# Patient Record
Sex: MALE | Race: WHITE | Employment: OTHER | URBAN - METROPOLITAN AREA
[De-identification: names, ages, dates, MRNs, and addresses within clinical notes are randomized per-mention and may not be internally consistent; named-entity substitution may affect disease eponyms.]

---

## 2017-01-04 ENCOUNTER — TELEPHONE (OUTPATIENT)
Dept: FAMILY MEDICINE CLINIC | Age: 72
End: 2017-01-04

## 2017-01-17 PROBLEM — M54.12 CERVICAL RADICULITIS: Status: ACTIVE | Noted: 2017-01-17

## 2017-04-04 ENCOUNTER — OFFICE VISIT (OUTPATIENT)
Dept: FAMILY MEDICINE CLINIC | Age: 72
End: 2017-04-04

## 2017-04-04 VITALS
SYSTOLIC BLOOD PRESSURE: 130 MMHG | HEIGHT: 66 IN | BODY MASS INDEX: 22.98 KG/M2 | OXYGEN SATURATION: 98 % | DIASTOLIC BLOOD PRESSURE: 82 MMHG | TEMPERATURE: 98.3 F | WEIGHT: 143 LBS | HEART RATE: 64 BPM

## 2017-04-04 DIAGNOSIS — R05.9 COUGH: Primary | ICD-10-CM

## 2017-04-04 PROCEDURE — 99212 OFFICE O/P EST SF 10 MIN: CPT | Performed by: NURSE PRACTITIONER

## 2017-04-04 ASSESSMENT — ENCOUNTER SYMPTOMS
SORE THROAT: 0
VOMITING: 0
SINUS PRESSURE: 0
COUGH: 1
NAUSEA: 0
WHEEZING: 0
ABDOMINAL PAIN: 0
DIARRHEA: 0
SHORTNESS OF BREATH: 0

## 2017-04-05 ASSESSMENT — ENCOUNTER SYMPTOMS: CHEST TIGHTNESS: 0

## 2017-06-08 ENCOUNTER — OFFICE VISIT (OUTPATIENT)
Dept: FAMILY MEDICINE CLINIC | Age: 72
End: 2017-06-08

## 2017-06-08 VITALS
DIASTOLIC BLOOD PRESSURE: 77 MMHG | WEIGHT: 142 LBS | RESPIRATION RATE: 16 BRPM | HEART RATE: 61 BPM | HEIGHT: 65 IN | BODY MASS INDEX: 23.66 KG/M2 | SYSTOLIC BLOOD PRESSURE: 149 MMHG | TEMPERATURE: 97.9 F

## 2017-06-08 DIAGNOSIS — Z23 NEED FOR TDAP VACCINATION: ICD-10-CM

## 2017-06-08 DIAGNOSIS — Z00.00 MEDICARE ANNUAL WELLNESS VISIT, INITIAL: Primary | ICD-10-CM

## 2017-06-08 DIAGNOSIS — Z00.00 ROUTINE GENERAL MEDICAL EXAMINATION AT A HEALTH CARE FACILITY: ICD-10-CM

## 2017-06-08 PROCEDURE — 90471 IMMUNIZATION ADMIN: CPT | Performed by: FAMILY MEDICINE

## 2017-06-08 PROCEDURE — 90715 TDAP VACCINE 7 YRS/> IM: CPT | Performed by: FAMILY MEDICINE

## 2017-06-08 PROCEDURE — G0438 PPPS, INITIAL VISIT: HCPCS | Performed by: FAMILY MEDICINE

## 2017-06-08 ASSESSMENT — ANXIETY QUESTIONNAIRES: GAD7 TOTAL SCORE: 0

## 2017-06-08 ASSESSMENT — LIFESTYLE VARIABLES: HOW OFTEN DO YOU HAVE A DRINK CONTAINING ALCOHOL: 0

## 2017-06-08 ASSESSMENT — PATIENT HEALTH QUESTIONNAIRE - PHQ9: SUM OF ALL RESPONSES TO PHQ QUESTIONS 1-9: 0

## 2017-08-08 ENCOUNTER — OFFICE VISIT (OUTPATIENT)
Dept: FAMILY MEDICINE CLINIC | Age: 72
End: 2017-08-08

## 2017-08-08 VITALS
HEART RATE: 57 BPM | TEMPERATURE: 98.1 F | DIASTOLIC BLOOD PRESSURE: 66 MMHG | WEIGHT: 143 LBS | RESPIRATION RATE: 16 BRPM | BODY MASS INDEX: 23.8 KG/M2 | SYSTOLIC BLOOD PRESSURE: 151 MMHG

## 2017-08-08 DIAGNOSIS — M67.432 GANGLION CYST OF WRIST, LEFT: ICD-10-CM

## 2017-08-08 DIAGNOSIS — G56.22 ULNAR NEUROPATHY AT WRIST, LEFT: Primary | ICD-10-CM

## 2017-08-08 PROCEDURE — 99213 OFFICE O/P EST LOW 20 MIN: CPT | Performed by: FAMILY MEDICINE

## 2017-08-21 ENCOUNTER — OFFICE VISIT (OUTPATIENT)
Dept: ORTHOPEDIC SURGERY | Age: 72
End: 2017-08-21

## 2017-08-21 VITALS
DIASTOLIC BLOOD PRESSURE: 79 MMHG | HEIGHT: 65 IN | HEART RATE: 60 BPM | SYSTOLIC BLOOD PRESSURE: 142 MMHG | BODY MASS INDEX: 23.76 KG/M2 | WEIGHT: 142.6 LBS

## 2017-08-21 DIAGNOSIS — G56.22 ULNAR NEUROPATHY OF LEFT UPPER EXTREMITY: ICD-10-CM

## 2017-08-21 DIAGNOSIS — M25.532 LEFT WRIST PAIN: Primary | ICD-10-CM

## 2017-08-21 PROCEDURE — 73110 X-RAY EXAM OF WRIST: CPT | Performed by: ORTHOPAEDIC SURGERY

## 2017-08-21 PROCEDURE — 99214 OFFICE O/P EST MOD 30 MIN: CPT | Performed by: ORTHOPAEDIC SURGERY

## 2017-09-14 ENCOUNTER — OFFICE VISIT (OUTPATIENT)
Dept: ORTHOPEDIC SURGERY | Age: 72
End: 2017-09-14

## 2017-09-14 DIAGNOSIS — G56.22 ULNAR NEUROPATHY OF LEFT UPPER EXTREMITY: Primary | ICD-10-CM

## 2017-09-14 DIAGNOSIS — M54.12 RADICULOPATHY OF CERVICAL SPINE: ICD-10-CM

## 2017-09-14 PROCEDURE — 95886 MUSC TEST DONE W/N TEST COMP: CPT | Performed by: PHYSICAL MEDICINE & REHABILITATION

## 2017-09-14 PROCEDURE — 95908 NRV CNDJ TST 3-4 STUDIES: CPT | Performed by: PHYSICAL MEDICINE & REHABILITATION

## 2017-09-25 ENCOUNTER — OFFICE VISIT (OUTPATIENT)
Dept: ORTHOPEDIC SURGERY | Age: 72
End: 2017-09-25

## 2017-09-25 VITALS — BODY MASS INDEX: 23.76 KG/M2 | WEIGHT: 142.64 LBS | HEIGHT: 65 IN

## 2017-09-25 DIAGNOSIS — G56.22 ULNAR NEUROPATHY OF LEFT UPPER EXTREMITY: ICD-10-CM

## 2017-09-25 DIAGNOSIS — R22.32 MASS OF LEFT WRIST: ICD-10-CM

## 2017-09-25 DIAGNOSIS — M25.532 LEFT WRIST PAIN: Primary | ICD-10-CM

## 2017-09-25 PROCEDURE — 99214 OFFICE O/P EST MOD 30 MIN: CPT | Performed by: ORTHOPAEDIC SURGERY

## 2017-09-26 ENCOUNTER — TELEPHONE (OUTPATIENT)
Dept: ORTHOPEDIC SURGERY | Age: 72
End: 2017-09-26

## 2017-09-27 DIAGNOSIS — R22.32 MASS OF LEFT WRIST: Primary | ICD-10-CM

## 2017-09-27 DIAGNOSIS — G56.22 ULNAR NEUROPATHY OF LEFT UPPER EXTREMITY: ICD-10-CM

## 2017-10-02 ENCOUNTER — OFFICE VISIT (OUTPATIENT)
Dept: FAMILY MEDICINE CLINIC | Age: 72
End: 2017-10-02

## 2017-10-02 VITALS
OXYGEN SATURATION: 98 % | TEMPERATURE: 98.1 F | RESPIRATION RATE: 16 BRPM | HEIGHT: 65 IN | HEART RATE: 69 BPM | WEIGHT: 142.6 LBS | DIASTOLIC BLOOD PRESSURE: 62 MMHG | BODY MASS INDEX: 23.76 KG/M2 | SYSTOLIC BLOOD PRESSURE: 123 MMHG

## 2017-10-02 DIAGNOSIS — R22.32 FOREARM MASS, LEFT: Primary | ICD-10-CM

## 2017-10-02 DIAGNOSIS — Z01.818 PREOP EXAMINATION: ICD-10-CM

## 2017-10-02 DIAGNOSIS — R22.32 FOREARM MASS, LEFT: ICD-10-CM

## 2017-10-02 LAB
A/G RATIO: 1.4 (ref 1.1–2.2)
ALBUMIN SERPL-MCNC: 4.1 G/DL (ref 3.4–5)
ALP BLD-CCNC: 77 U/L (ref 40–129)
ALT SERPL-CCNC: 6 U/L (ref 10–40)
ANION GAP SERPL CALCULATED.3IONS-SCNC: 13 MMOL/L (ref 3–16)
AST SERPL-CCNC: 16 U/L (ref 15–37)
BILIRUB SERPL-MCNC: 0.4 MG/DL (ref 0–1)
BUN BLDV-MCNC: 19 MG/DL (ref 7–20)
CALCIUM SERPL-MCNC: 9.7 MG/DL (ref 8.3–10.6)
CHLORIDE BLD-SCNC: 102 MMOL/L (ref 99–110)
CO2: 28 MMOL/L (ref 21–32)
CREAT SERPL-MCNC: 1.2 MG/DL (ref 0.8–1.3)
GFR AFRICAN AMERICAN: >60
GFR NON-AFRICAN AMERICAN: 60
GLOBULIN: 2.9 G/DL
GLUCOSE BLD-MCNC: 70 MG/DL (ref 70–99)
HCT VFR BLD CALC: 40.5 % (ref 40.5–52.5)
HEMOGLOBIN: 13.7 G/DL (ref 13.5–17.5)
MCH RBC QN AUTO: 31.9 PG (ref 26–34)
MCHC RBC AUTO-ENTMCNC: 33.7 G/DL (ref 31–36)
MCV RBC AUTO: 94.7 FL (ref 80–100)
PDW BLD-RTO: 13.7 % (ref 12.4–15.4)
PLATELET # BLD: 188 K/UL (ref 135–450)
PMV BLD AUTO: 8.6 FL (ref 5–10.5)
POTASSIUM SERPL-SCNC: 3.9 MMOL/L (ref 3.5–5.1)
RBC # BLD: 4.28 M/UL (ref 4.2–5.9)
SODIUM BLD-SCNC: 143 MMOL/L (ref 136–145)
TOTAL PROTEIN: 7 G/DL (ref 6.4–8.2)
WBC # BLD: 7.5 K/UL (ref 4–11)

## 2017-10-02 PROCEDURE — 93000 ELECTROCARDIOGRAM COMPLETE: CPT | Performed by: NURSE PRACTITIONER

## 2017-10-02 PROCEDURE — 99214 OFFICE O/P EST MOD 30 MIN: CPT | Performed by: NURSE PRACTITIONER

## 2017-10-02 ASSESSMENT — ENCOUNTER SYMPTOMS
BACK PAIN: 1
EYES NEGATIVE: 1
RESPIRATORY NEGATIVE: 1
GASTROINTESTINAL NEGATIVE: 1
ALLERGIC/IMMUNOLOGIC NEGATIVE: 1

## 2017-10-02 NOTE — MR AVS SNAPSHOT
After Visit Summary             Violeta Guardado   10/2/2017 1:00 PM   Office Visit    Description:  Male : 1945   Provider:  Aj Luna NP   Department:  [facility] Medicine [address]              Your Follow-Up and Future Appointments         Below is a list of your follow-up and future appointments. This may not be a complete list as you may have made appointments directly with providers that we are not aware of or your providers may have made some for you. Please call your providers to confirm appointments. It is important to keep your appointments. Please bring your current insurance card, photo ID, co-pay, and all medication bottles to your appointment. If self-pay, payment is expected at the time of service. Your To-Do List     Future Appointments Provider Department Dept Phone    10/11/2017 9:00 AM DELGADO Ortiz  [phone]    10/13/2017 10:30 AM Salena Crow MD XD Nutrition [phone]    Future Orders Complete By Expires    CBC [ZRT316 Custom]  10/2/2017 10/2/2018    COMPREHENSIVE METABOLIC PANEL [ECU63 Custom]  10/2/2017 10/2/2018    Follow-Up    Return if symptoms worsen or fail to improve.          Information from Your Visit        Department     Name Address Phone Fax    [address] [place] [address] [address] [facility] [phone]      You Were Seen for:         Comments    Forearm mass, left   [[id number]]         Vital Signs     Blood Pressure Pulse Temperature Respirations Height Weight    123/62 (Site: Left Arm, Position: Sitting, Cuff Size: Medium Adult) 69 98.1 °F (36.7 °C) (Oral) 16 5' 5\" (1.651 m) 142 lb 9.6 oz (64.7 kg)    Oxygen Saturation Body Mass Index Smoking Status             98% 23.73 kg/m2 Former Smoker            Medications and Orders      Your Current Medications Are lisinopril-hydrochlorothiazide (PRINZIDE;ZESTORETIC) 10-12.5 MG per tablet TAKE ONE TABLET BY MOUTH DAILY FOR HIGH BLOOD PRESSURE    VIAGRA 100 MG tablet TAKE AS NEEDED FOR ERECTILE DYSFUNCTION AS DIRECTED. ONCE IN 24 HOURS    aspirin 81 MG EC tablet Take 81 mg by mouth daily.         Allergies              Clarithromycin     Gabapentin     Pravastatin     Promethazine-codeine     Vicodin [Hydrocodone-acetaminophen]     Causes prostate swelling- needs to take terazosin if takes vicodin    Zegerid [Omeprazole]       We Ordered/Performed the following           EKG 12 Lead     Scheduling Instructions:    Physical         Result Summary for EKG 12 Lead      Result Information     Status          Final result (Collected: 10/2/2017  1:27 PM)           10/2/2017  1:27 PM      Scans on Order [id number]            ECG on 10/2/2017  1:27 PM : ECG Report                     Additional Information        Basic Information     Date Of Birth Sex Race Ethnicity Preferred Language    1945 Male White Non-/Non  English      Problem List as of 10/2/2017  Date Reviewed: 9/25/2017                Mass of left wrist    Ulnar neuropathy of left upper extremity    Left wrist pain    Cervical radiculitis    Bilateral high frequency sensorineural hearing loss    HTN (hypertension)    Related Goals    Blood Pressure < 140/90    GERD (gastroesophageal reflux disease)    Left TKR      Your Goals as of 10/2/2017 at 1:42 PM              Today    8/21/17 8/8/17       Blood Pressure    Blood Pressure < 140/90   123/62  142/79  151/66    Related Problems    HTN (hypertension)      Immunizations as of 10/2/2017     Name Date    Influenza Virus Vaccine 10/23/2014    Influenza, High Dose 11/17/2016, 10/29/2015    Pneumococcal Polysaccharide (Ssuikislf55) 2/3/2013    Tdap (Boostrix, Adacel) 6/8/2017      Preventive Care        Date Due    One-time abdominal aortic aneurism (AAA) screening is recommended for all men between the age of 73-68 who have ever smoked 1945    Hepatitis C screening is recommended for all adults regardless of risk factors born between [facility] at least once (lifetime) who have never been tested. 1945    Cholesterol Screening 12/12/1985    Zoster Vaccine 12/12/2005    Pneumococcal Vaccines (two) for all adults aged 72 and over (2 of 2 - PCV13) 2/3/2014    Yearly Flu Vaccine (1) 9/1/2017    Colonoscopy 10/16/2023    Tetanus Combination Vaccine (2 - Td) 6/8/2027            [email or web address] Signup           Our records indicate that you have an active Shiftboard Online Scheduling account. You can view your After Visit Summary by going to [email or web address] and logging in with your Shiftboard Online Scheduling username and password. If you don't have a Shiftboard Online Scheduling username and password but a parent or guardian has access to your record, the parent or guardian should login with their own Shiftboard Online Scheduling username and password and access your record to view the After Visit Summary. Additional Information  If you have questions, please contact the physician practice where you receive care. Remember, Shiftboard Online Scheduling is NOT to be used for urgent needs. For medical emergencies, dial 911. For questions regarding your Shiftboard Online Scheduling account call [phone]. If you have a clinical question, please call your doctor's office.

## 2017-10-02 NOTE — PROGRESS NOTES
Negative. Musculoskeletal: Positive for arthralgias (left wrist) and back pain (chronic). Age related arthritis   Skin: Negative. Allergic/Immunologic: Negative. Neurological: Negative. Hematological: Negative. Psychiatric/Behavioral: Negative. Physical Exam     Physical Exam   Constitutional: He is oriented to person, place, and time. He appears well-developed and well-nourished. No distress. HENT:   Head: Normocephalic and atraumatic. Right Ear: External ear normal.   Left Ear: External ear normal.   Nose: Nose normal.   Mouth/Throat: Oropharynx is clear and moist. No oropharyngeal exudate. Eyes: Conjunctivae and EOM are normal. Right eye exhibits no discharge. Left eye exhibits no discharge. Neck: Normal range of motion. Neck supple. Cardiovascular: Normal rate, regular rhythm and normal heart sounds. Exam reveals no gallop and no friction rub. No murmur heard. Pulmonary/Chest: Effort normal and breath sounds normal. No respiratory distress. He has no wheezes. He has no rales. Abdominal: Soft. Bowel sounds are normal. He exhibits no distension. There is no tenderness. Musculoskeletal: Normal range of motion. He exhibits no edema or tenderness. Lymphadenopathy:     He has no cervical adenopathy. Neurological: He is alert and oriented to person, place, and time. He exhibits normal muscle tone. Coordination normal.   Skin: Skin is warm and dry. No rash noted. He is not diaphoretic. No erythema. No pallor. Psychiatric: He has a normal mood and affect. His behavior is normal. Judgment and thought content normal.   Nursing note and vitals reviewed. EKG Interpretation:  normal EKG, normal sinus rhythm, unchanged from previous tracings. Lab Review ---CBC and CMP ordered today  No visits with results within 6 Month(s) from this visit.   Latest known visit with results is:    Office Visit on 07/16/2015   Component Date Value    Glucose, UA POC 07/16/2015 n     Bilirubin, UA 07/16/2015 n     Ketones, UA 07/16/2015 n     Spec Grav, UA 07/16/2015 1.020     Blood, UA POC 07/16/2015 n     pH, UA 07/16/2015 6.0     Protein, UA POC 07/16/2015 n     Urobilinogen, UA 07/16/2015 neg     Leukocytes, UA 07/16/2015 n     Nitrite, UA 07/16/2015 n            Assessment:       70 y.o. patient with planned surgery as above. Known risk factors for perioperative complications: Anemia, Hypertension  Current medications which may produce withdrawal symptoms if withheld perioperatively: none      Plan:     1. Preoperative workup as follows: hemoglobin, hematocrit, electrolytes, creatinine, EKG  2. Change in medication regimen before surgery: Hold all medications on morning of surgery  3. Prophylaxis for cardiac events with perioperative beta-blockers: Not indicated  ACC/AHA indications for pre-operative beta-blocker use:    · Vascular surgery with history of postitive stress test  · Intermediate or high risk surgery with history of CAD   · Intermediate or high risk surgery with multiple clinical predictors of CAD- 2 of the following: history of compensated or prior heart failure, history of cerebrovascular disease, DM, or renal insufficiency    Routine administration of higher-dose, long-acting metoprolol in beta-blockernaïve patients on the day of surgery, and in the absence of dose titration is associated with an overall increase in mortality. Beta-blockers should be started days to weeks prior to surgery and titrated to pulse < 70.  4. Deep vein thrombosis prophylaxis: regimen to be chosen by surgical team  5.  No contraindications to planned surgery    Keya Isaac NP

## 2017-10-03 NOTE — OP NOTE
[facility] Sports Medicine  Procedure Note  [facility]  10/4/2017    Pre-operative Diagnosis:Left forearm deep mass and ulnar neuropathy    Post-operative Diagnosis: same    Procedure:  1. Excision Left forearm deep mass 25mm    2. Left ulnar nerve decompression at wrist    Surgeon: Liz Cordova    Anesthesia:  General    Complications:  None    INDICATIONS FOR PROCEDURE: The patient has clinical evidence of symptomatic deep mass along the volar left wrist and distal forearm with electrodiagnostic and clinical signs of ulnar neuropathy at the forearm and wrist and has failed appropriate conservative management. The patient understands the relevant risks, benefits, limitations, chance of recurrence and healing process of the procedure. PROCEDURE: The patient was brought to the operating room and anesthetized with general anesthesia. The identified and marked extremity was then prepped and draped in a standard fashion. A well-padded tourniquet was applied to the upper arm. The arm was exsanguinated and the tourniquet elevated to 250 mmHg. A standard zigzag incision was made at the level of the prominence over the left distal forearm and wrist.  Dissection carried down through skin and subcutaneous tissue with careful attention paid to hemostasis. The tendinous structures were dissected free and retracted. The ulnar neurovascular bundle was carefully dissected free from the mass and retracted. The mass had an appearance consistent with likely deep synovial cyst and was intimately compressing the deep aspect of the ulnar nerve at the wrist and extending into the proximal aspect of Guyon's canal. It did not have a classic appearance of Schwannoma or nerve sheath origin. The cystic material measured approximately 25mm and was excised carefully with great care undertaken toward hemostasis and gentle protection of the ulnar nerve.   The excised tissue was

## 2017-10-04 ENCOUNTER — HOSPITAL ENCOUNTER (OUTPATIENT)
Dept: SURGERY | Age: 72
Discharge: HOME | End: 2017-10-04
Attending: ORTHOPAEDIC SURGERY | Admitting: ORTHOPAEDIC SURGERY

## 2017-10-04 VITALS
TEMPERATURE: 97.7 F | DIASTOLIC BLOOD PRESSURE: 77 MMHG | OXYGEN SATURATION: 96 % | HEART RATE: 68 BPM | SYSTOLIC BLOOD PRESSURE: 136 MMHG | RESPIRATION RATE: 16 BRPM

## 2017-10-04 RX ORDER — DIPHENHYDRAMINE HYDROCHLORIDE 50 MG/ML
12.5 INJECTION INTRAMUSCULAR; INTRAVENOUS
Status: ACTIVE | OUTPATIENT
Start: 2017-10-04 | End: 2017-10-04

## 2017-10-04 RX ORDER — SODIUM CHLORIDE 0.9 % (FLUSH) 0.9 %
10 SYRINGE (ML) INJECTION EVERY 12 HOURS SCHEDULED
Status: DISCONTINUED | OUTPATIENT
Start: 2017-10-04 | End: 2017-10-05 | Stop reason: HOSPADM

## 2017-10-04 RX ORDER — MORPHINE SULFATE 2 MG/ML
2 INJECTION, SOLUTION INTRAMUSCULAR; INTRAVENOUS EVERY 5 MIN PRN
Status: DISCONTINUED | OUTPATIENT
Start: 2017-10-04 | End: 2017-10-05 | Stop reason: HOSPADM

## 2017-10-04 RX ORDER — HYDRALAZINE HYDROCHLORIDE 20 MG/ML
5 INJECTION INTRAMUSCULAR; INTRAVENOUS EVERY 10 MIN PRN
Status: DISCONTINUED | OUTPATIENT
Start: 2017-10-04 | End: 2017-10-05 | Stop reason: HOSPADM

## 2017-10-04 RX ORDER — LIDOCAINE HYDROCHLORIDE 10 MG/ML
1 INJECTION, SOLUTION EPIDURAL; INFILTRATION; INTRACAUDAL; PERINEURAL
Status: ACTIVE | OUTPATIENT
Start: 2017-10-04 | End: 2017-10-04

## 2017-10-04 RX ORDER — OXYCODONE HYDROCHLORIDE AND ACETAMINOPHEN 5; 325 MG/1; MG/1
2 TABLET ORAL PRN
Status: ACTIVE | OUTPATIENT
Start: 2017-10-04 | End: 2017-10-04

## 2017-10-04 RX ORDER — SODIUM CHLORIDE 0.9 % (FLUSH) 0.9 %
10 SYRINGE (ML) INJECTION PRN
Status: DISCONTINUED | OUTPATIENT
Start: 2017-10-04 | End: 2017-10-05 | Stop reason: HOSPADM

## 2017-10-04 RX ORDER — OXYCODONE HYDROCHLORIDE AND ACETAMINOPHEN 5; 325 MG/1; MG/1
1 TABLET ORAL EVERY 6 HOURS PRN
Qty: 28 TABLET | Refills: 0 | Status: SHIPPED | OUTPATIENT
Start: 2017-10-04 | End: 2017-10-11

## 2017-10-04 RX ORDER — ONDANSETRON 2 MG/ML
4 INJECTION INTRAMUSCULAR; INTRAVENOUS
Status: ACTIVE | OUTPATIENT
Start: 2017-10-04 | End: 2017-10-04

## 2017-10-04 RX ORDER — MORPHINE SULFATE 2 MG/ML
1 INJECTION, SOLUTION INTRAMUSCULAR; INTRAVENOUS EVERY 5 MIN PRN
Status: DISCONTINUED | OUTPATIENT
Start: 2017-10-04 | End: 2017-10-05 | Stop reason: HOSPADM

## 2017-10-04 RX ORDER — ONDANSETRON 4 MG/1
4 TABLET, FILM COATED ORAL EVERY 8 HOURS PRN
Qty: 10 TABLET | Refills: 1 | Status: SHIPPED | OUTPATIENT
Start: 2017-10-04 | End: 2018-01-03 | Stop reason: ALTCHOICE

## 2017-10-04 RX ORDER — MEPERIDINE HYDROCHLORIDE 50 MG/ML
12.5 INJECTION INTRAMUSCULAR; INTRAVENOUS; SUBCUTANEOUS EVERY 5 MIN PRN
Status: DISCONTINUED | OUTPATIENT
Start: 2017-10-04 | End: 2017-10-05 | Stop reason: HOSPADM

## 2017-10-04 RX ORDER — LABETALOL HYDROCHLORIDE 5 MG/ML
5 INJECTION, SOLUTION INTRAVENOUS EVERY 10 MIN PRN
Status: DISCONTINUED | OUTPATIENT
Start: 2017-10-04 | End: 2017-10-05 | Stop reason: HOSPADM

## 2017-10-04 RX ORDER — OXYCODONE HYDROCHLORIDE AND ACETAMINOPHEN 5; 325 MG/1; MG/1
1 TABLET ORAL PRN
Status: ACTIVE | OUTPATIENT
Start: 2017-10-04 | End: 2017-10-04

## 2017-10-04 RX ORDER — SODIUM CHLORIDE, SODIUM LACTATE, POTASSIUM CHLORIDE, CALCIUM CHLORIDE 600; 310; 30; 20 MG/100ML; MG/100ML; MG/100ML; MG/100ML
INJECTION, SOLUTION INTRAVENOUS CONTINUOUS
Status: DISCONTINUED | OUTPATIENT
Start: 2017-10-04 | End: 2017-10-05 | Stop reason: HOSPADM

## 2017-10-04 RX ADMIN — SODIUM CHLORIDE, SODIUM LACTATE, POTASSIUM CHLORIDE, CALCIUM CHLORIDE: 600; 310; 30; 20 INJECTION, SOLUTION INTRAVENOUS at 07:46

## 2017-10-04 ASSESSMENT — PAIN SCALES - GENERAL
PAINLEVEL_OUTOF10: 0

## 2017-10-04 NOTE — IP AVS SNAPSHOT
After Visit Summary  (Discharge Instructions)    Medication List for Home    Based on the information you provided to us as well as any changes during this visit, the following is your updated medication list.  Compare this with your prescription bottles at home. If you have any questions or concerns, contact your primary care physician's office. Daily Medication List (This medication list can be shared with any healthcare provider who is helping you manage your medications)      There are NEW medications for you. START taking them after you leave the hospital        Last Dose    Next Dose Due AM NOON PM NIGHT    ondansetron 4 MG tablet   Commonly known as:  ZOFRAN   Take 1 tablet by mouth every 8 hours as needed for Nausea                                         oxyCODONE-acetaminophen 5-325 MG per tablet   Commonly known as:  PERCOCET   Take 1 tablet by mouth every 6 hours as needed for Pain . These are medications you told us you were taking at home, CONTINUE taking them after you leave the hospital        Last Dose    Next Dose Due AM NOON PM NIGHT    aspirin 81 MG EC tablet   Take 81 mg by mouth daily. lisinopril-hydrochlorothiazide 10-12.5 MG per tablet   Commonly known as:  PRINZIDE;ZESTORETIC   TAKE ONE TABLET BY MOUTH DAILY FOR HIGH BLOOD PRESSURE                                         VIAGRA 100 MG tablet   Generic drug:  sildenafil   TAKE AS NEEDED FOR ERECTILE DYSFUNCTION AS DIRECTED.  ONCE IN 24 HOURS                                              Where to Get Your Medications      You can get these medications from any pharmacy     Bring a paper prescription for each of these medications     ondansetron 4 MG tablet    oxyCODONE-acetaminophen 5-325 MG per tablet               Allergies as of 10/4/2017        Reactions    Clarithromycin     Gabapentin     Pravastatin     Promethazine-codeine A surgical site infection (SSI) is an infection that occurs after surgery in the part of the body where the surgery took place. Most patients who have surgery do not develop an infection. However, infections can develop in about 1-3 cases for every 100 patients who have had surgery. Our goal is for you to NOT experience any complications and be completely satisfied with your care! However, some signs or symptoms to look for and report immediately to your doctor are:   1. Fever above 101 degrees    2. Redness and increasing pain around the area  where you had surgery   3. Drainage of cloudy fluid or pus coming from the surgical area    Some of the things we/ you can do to prevent SSI's are:   1. Clean hands with soap and water or an alcohol-based hand rub before and after caring for the operative area. This occurs the day of surgery and for the next 2 weeks. 2.Sometimes you receive an appropriate antibiotic within 60 minutes before your surgery or take one for several days after surgery depending on your surgeon's instructions and/or the type of surgery you are having. 3. Family and/or friends who visit you should NOT touch the surgical wound or dressings until advised by your surgeon. 4. Be sure to elevate and decrease the swelling after your surgery to help prevent infection. 5. If you are a diabetic, you need to closely monitor your blood sugar levels and report any significant increases or changes to your surgeon to help promote the healing process. Take percocet as prescribed as needed for pain. Take zofran as prescribed as needed for nausea. Important information for a smoker       SMOKING: QUIT SMOKING. THIS IS THE MOST IMPORTANT ACTION YOU CAN TAKE TO IMPROVE YOUR CURRENT AND FUTURE HEALTH. Call the [facility] [place] NOW ([phone])    Smoking harms nonsmokers.  When nonsmokers are around people who smoke, they absorb nicotine, carbon monoxide, and other ingredients of tobacco smoke. DO NOT SMOKE AROUND CHILDREN     Children exposed to secondhand smoke are at an increased risk of:  Sudden Infant Death Syndrome (SIDS), acute respiratory infections, inflammation of the middle ear, and severe asthma. Over a longer time, it causes heart disease and lung cancer. There is no safe level of exposure to secondhand smoke. MyChart Signup     Our records indicate that you have an active Luminary Microt account. You can view your After Visit Summary by going to [email or web address] and logging in with your Jascha username and password. If you don't have a Jascha username and password but a parent or guardian has access to your record, the parent or guardian should login with their own Jascha username and password and access your record to view the After Visit Summary. Additional Information  If you have questions, please contact the physician practice where you receive care. Remember, Jascha is NOT to be used for urgent needs. For medical emergencies, dial 911. For questions regarding your Cohda Wirelesshart account call [phone]. If you have a clinical question, please call your doctor's office. View your information online  ? Review your current list of  medications, immunization, and allergies. ? Review your future test results online . ? Review your discharge instructions provided by your caregivers at discharge    Certain functionality such as prescription refills, scheduling appointments or sending messages to your provider are not activated if your provider does not use i-Human Patients in his/her office    For questions regarding your Luminary Microt account call [phone]. If you have a clinical question, please call your doctor's office.          The information on all pages of the After Visit Summary has been reviewed with me, the patient and/or responsible adult, by my health care provider(s). I had the opportunity to ask questions regarding this information. I understand I should dispose of my armband safely at home to protect my health information. A complete copy of the After Visit Summary has been given to me, the patient and/or responsible adult.            Patient Signature/Responsible Adult:____________________    Clinician Signature:_____________________    Date:_____________________    Time:_____________________

## 2017-10-04 NOTE — IP AVS SNAPSHOT
Patient Information     Patient Name MORTEZA Thompson 1945         This is your updated medication list to keep with you all times      TAKE these medications     aspirin 81 MG EC tablet       lisinopril-hydrochlorothiazide 10-12.5 MG per tablet   Commonly known as:  PRINZIDE;ZESTORETIC   TAKE ONE TABLET BY MOUTH DAILY FOR HIGH BLOOD PRESSURE       ondansetron 4 MG tablet   Commonly known as:  ZOFRAN   Take 1 tablet by mouth every 8 hours as needed for Nausea       oxyCODONE-acetaminophen 5-325 MG per tablet   Commonly known as:  PERCOCET   Take 1 tablet by mouth every 6 hours as needed for Pain . VIAGRA 100 MG tablet   Generic drug:  sildenafil   TAKE AS NEEDED FOR ERECTILE DYSFUNCTION AS DIRECTED.  ONCE IN 24 HOURS

## 2017-10-04 NOTE — ANESTHESIA POST-OP
Postoperative Anesthesia Note    Name:    Brad Adams  MRN:      [id number]    Patient Vitals for the past 12 hrs:   BP Temp Temp src Pulse Resp SpO2   10/04/17 0950 136/77 - - 68 16 96 %   10/04/17 0945 136/77 97.7 °F (36.5 °C) Temporal 71 16 96 %   10/04/17 0940 122/74 - - 67 16 97 %   10/04/17 0935 124/67 - - - - -   10/04/17 0930 126/67 97.7 °F (36.5 °C) Temporal 67 12 96 %   10/04/17 0739 (!) 149/82 97.8 °F (36.6 °C) - 50 16 100 %        LABS:    CBC  Lab Results   Component Value Date/Time    WBC 7.5 10/02/2017 01:50 PM    HGB 13.7 10/02/2017 01:50 PM    HCT 40.5 10/02/2017 01:50 PM     10/02/2017 01:50 PM     RENAL  Lab Results   Component Value Date/Time     10/02/2017 01:50 PM    K 3.9 10/02/2017 01:50 PM     10/02/2017 01:50 PM    CO2 28 10/02/2017 01:50 PM    BUN 19 10/02/2017 01:50 PM    CREATININE 1.2 10/02/2017 01:50 PM    GLUCOSE 70 10/02/2017 01:50 PM     COAGS  Lab Results   Component Value Date/Time    PROTIME 11.2 01/22/2013 09:10 AM    INR 0.98 01/22/2013 09:10 AM    APTT 32.9 01/22/2013 09:10 AM       Intake & Output: In: 12 [P.O.:100; I.V.:700]  Out: 0     Nausea & Vomiting:  No    Level of Consciousness:  Awake    Pain Assessment:  Adequate analgesia    Anesthesia Complications:  No apparent anesthetic complications    SUMMARY      Vital signs stable on discharge from Stage I post anesthesia care.   Care transferred from Anesthesiology department on discharge from perioperative area

## 2017-10-04 NOTE — ANESTHESIA PRE-OP
149/82   10/02/17 123/62   08/21/17 (!) 142/79       NPO Status: Time of last liquid consumption: 2000                        Time of last solid consumption: 2000                        Date of last liquid consumption: 10/03/17                        Date of last solid food consumption: 10/03/17    BMI:   Wt Readings from Last 3 Encounters:   10/02/17 142 lb 9.6 oz (64.7 kg)   09/27/17 142 lb (64.4 kg)   09/25/17 142 lb 10.2 oz (64.7 kg)     There is no height or weight on file to calculate BMI. Anesthesia Evaluation   no history of anesthetic complications:   Airway: Mallampati: II  TM distance: <3 FB   Neck ROM: full  Mouth opening: > = 3 FB Dental:          Pulmonary:       ROS comment: H/o tob   Cardiovascular:    (+) hypertension:,             Beta Blocker:  Not on Beta Blocker   Neuro/Psych:   (+) neuromuscular disease:,    GI/Hepatic/Renal:   (+) hiatal hernia, GERD: well controlled,         Endo/Other: negative ROS         Abdominal:                 Pre-Operative Diagnosis: LEFT FOREARM DEEP MASS    70 y.o.   BMI:  There is no height or weight on file to calculate BMI.      Vitals:    10/04/17 0739   BP: (!) 149/82   Pulse: 50   Resp: 16   Temp: 97.8 °F (36.6 °C)   SpO2: 100%       Allergies   Allergen Reactions    Clarithromycin     Gabapentin     Pravastatin     Promethazine-Codeine     Vicodin [Hydrocodone-Acetaminophen]      Causes prostate swelling- needs to take terazosin if takes vicodin    Zegerid [Omeprazole]        Social History   Substance Use Topics    Smoking status: Former Smoker     Packs/day: 0.50     Years: 17.00     Quit date: 10/21/1976    Smokeless tobacco: Never Used    Alcohol use No       LABS:    CBC  Lab Results   Component Value Date/Time    WBC 7.5 10/02/2017 01:50 PM    HGB 13.7 10/02/2017 01:50 PM    HCT 40.5 10/02/2017 01:50 PM     10/02/2017 01:50 PM     RENAL  Lab Results   Component Value Date/Time     10/02/2017 01:50 PM    K 3.9 10/02/2017 01:50 PM  10/02/2017 01:50 PM    CO2 28 10/02/2017 01:50 PM    BUN 19 10/02/2017 01:50 PM    CREATININE 1.2 10/02/2017 01:50 PM    GLUCOSE 70 10/02/2017 01:50 PM     COAGS  Lab Results   Component Value Date/Time    PROTIME 11.2 01/22/2013 09:10 AM    INR 0.98 01/22/2013 09:10 AM    APTT 32.9 01/22/2013 09:10 AM        Anesthesia Plan    ASA 2     general   (Risks, benefits and alternatives of GA discussed with pt. Questions answered. Willing to proceed.)  intravenous induction   Anesthetic plan and risks discussed with patient.             Parish Manrique MD   10/4/2017

## 2017-10-11 ENCOUNTER — HOSPITAL ENCOUNTER (OUTPATIENT)
Dept: OCCUPATIONAL THERAPY | Age: 72
Discharge: HOME | End: 2017-10-11
Admitting: ORTHOPAEDIC SURGERY

## 2017-10-11 ENCOUNTER — HOSPITAL ENCOUNTER (OUTPATIENT)
Dept: OCCUPATIONAL THERAPY | Age: 72
Discharge: HOME | End: 2017-09-30
Admitting: ORTHOPAEDIC SURGERY

## 2017-10-11 NOTE — FLOWSHEET NOTE
[facility],  [address] [place]  Phone: [phone]  Fax [phone]  Hand Therapy Daily Treatment Note  Date:  10/11/2017    Patient: Aime Conklin   : 1945   MRN: [id number]  Referring Physician: Referring Practitioner: Sriram Castro    Medical Diagnosis Information:   Diagnosis: R22.32 (ICD-10-CM) - Mass of left wrist; G56.22 (ICD-10-CM) - Ulnar neuropathy of left upper extremity   Treatment Diagnosis: wrist stiffness L M25.642, L elbow stiffness M25.622-S/P EXCISION LEFT FOREARM DEEP MASS 10-4-17     Date of injury:gradual onset over past 2 months  Date of Surgery/Type of procedure:     Excision of mass in forearm                                 Insurance information:OT Insurance Information: [facility] $25 copay   Comorbidities Affecting Functional Performance:     []Anxiety (F41.9)/Depression (F32.9)   []Diabetes Type 1(E10.65) or 2 (E11.65)   []Rheumatoid Arthritis (M05.9)  []Fibromyalgia (M79.7)  []Neuropathy(G60.9)  []Osteoarthritis(M19.91)  [x]None   []Other:    G-code: OT G-codes  Score: 34%  Functional Limitation: Carrying, moving and handling objects  Carrying, Moving and Handling Objects Current Status (): At least 20 percent but less than 40 percent impaired, limited or restricted  Carrying, Moving and Handling Objects Goal Status ():  At least 1 percent but less than 20 percent impaired, limited or restricted    Date of Patient follow up with Physician:  Preferred Language for Healthcare:   [x]English       []other:  Latex Allergy:  [x]NO      []YES  RESTRICTIONS/PRECAUTIONS:  NONE  Progress Note: [x]  Yes  []  No  Next due by : Visit #10       Visit # Insurance Allowable   1 $25 copay     Pain level: 3 /10     SUBJECTIVE:  Background/Relevant Medical & Therapy History: Reports about 2 months of numbness in ring and small fingers, pain, had a volar forearm cyst for several years which HEP activities related to strengthening, flexibility, endurance, ROM of scapular, scapulothoracic and UE control with self care, reaching, carrying, lifting, house/yardwork, driving/computer work    Manual Treatments:   [] (34533) Provided manual therapy to mobilize soft tissue/joints of the UE for the purpose of modulating pain, promoting relaxation,  increasing ROM, reducing/eliminating soft tissue swelling/inflammation/restriction, improving soft tissue extensibility and allowing for proper ROM for normal function with self care, reaching, carrying, lifting, house/yardwork, driving/computer work    Splinting:  [] Fabrication of: L-code  [] (86702) Checkout for orthotic/prosthetic use, established patient   [] (38898) Orthotic management and training (fitting and assessment)  [] Comments:    Charges:  Timed Code Treatment Minutes: 15   Total Treatment Minutes: 45   [x] EVAL (LOW) 23745   [] OT Re-eval (86457)  [] EVAL (MOD) 44561   [] EVAL (HIGH) 88359       [x] Preston (48462) x      [] JWASS(96615)  [] NMR (64015) x      [] Estim (attended) (24825)   [] Manual ([id number]) x       [] US (48781)  [] TA (55126) x      [] Paraffin (65197)  [] ADL  ([phone]) x     [] Splint/L code:    [] Estim (unattended) (29788)  [] Other:  [](96338) Checkout for orthotic use, established patient x       [] (91346) Orthotic mgmt & training  x        GOALS:Short Term Goals: To be achieved in: 2 weeks  1. Independent in HEP and progression per patient tolerance, in order to prevent re-injury. 2. Patient will have a decrease in pain to facilitate improvement in movement, function, and ADLs as indicated by Functional Deficits.     Long Term Goals to be achieved in 6  weeks, including patient directed goals to address identified performance deficits:  1) Pt to be independent in graded HEP progression with a good level of effort and compliance.   2) Pt to report a score of 15 % or less on the Quick DASH disability questionnaire for increased

## 2017-10-11 NOTE — PLAN OF CARE
limited or restricted      [x] Patient reported history, allergies, and medications reviewed - see intake form. SUBJECTIVE:  Date of injury: gradual onset of symptoms over past 2 months  Date of surgery:10/4/17    Background/Relevant Medical & Therapy History: Reports about 2 months of numbness in ring and small fingers, pain, had a volar forearm cyst for several years which suddenly disloved, and then had his new symptoms. 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   Recreational Activities/Meaningful Interests: golfing, walking    Prior Level of Function: [x] Independent with ADLs/IADLs     [] Assistance needed (describe):    Patient-Identified Primary Performance Deficits (to be addressed in POC):   [] bathing    [] household tasks (cooking/cleaning)   [] dressing    [] self feeding   [] grooming    [x] work/education-needs to be able to frequently lift boxes   [] functional mobility   [] sleeping/rest   [] toileting/hygiene   [x] recreational activities-golf   [] driving    [] community/social participation   [] other:     Comorbidities Affecting Functional Performance:     []Anxiety (F41.9)/Depression (F32.9)   []Diabetes Type 1(E10.65) or 2 (E11.65)   []Rheumatoid Arthritis (M05.9)  []Fibromyalgia (M79.7)  []Neuropathy(G60.9)  []Osteoarthritis(M19.91)  [x]None   []Other:    OBJECTIVE:   Date:  Hand Dominance:     []  Right    [] Left 10/11/2017     Objective Measures:    PAIN 3/10   Quick DASH 34%   Digits tip to DPFC in cm WNL   Thumb ROM WNL   Thumb opposition  WNL   Thumb Radial/Palmar abd ROM WNL   Wrist ROM Ext/Flex R:75/70  L:75/50   Rad/Uln dev ROM Not assessed   Forearm ROM  Sup/pron WNL   Elbow ROM Ext/flex No reported deficits   Shoulder Flex  Shoulder Abd  Shoulder IR/ER WNL   Edema in cm circumf. wrist R:16.9  L:17.7    strength in lbs R:  L:deferred   Pinch Strengthin lbs: lat  R:  L:   Pinch Strength in lbs:  3 point R:  L:     MMT:       Observations (including splints, bandages, incisions, scars): Incision is healing without evidence of complication, sutures intact. Sensation:  [] No reported deficits  [] Intact to light touch    [] [place] Tori test completed, findings as noted:  [x] Other:ulnar half of ring still numb, small finger close to normal now. Palpation: not assessed    Functional Mobility/Transfers/Gait:  [x] Independent - no significant gait deviations  [] Assistance needed   [] Assistive device used: Falls Risk Assessment (30 days):   [x] Falls Risk assessed and no intervention required. [] Falls Risk assessed and Patient requires intervention due to being higher risk   TUG score (>12s at risk):     [] Falls education provided, including      Review Of Systems (ROS): [x]Performed Review of systems (Integumentary, CardioPulmonary, Neurological) by intake and observation. Intake form has been scanned into medical record. Patient has been instructed to contact their primary care physician regarding ROS issues if not already being addressed at this time. ASSESSMENT:   This patient presents with signs and symptoms consistent with the medical diagnosis provided by the referring physician.      Impairments (physical, cognitive and/or psychosocial):  [] Decreased mobility   [] Weakness    [] Hypersensitivity   [] Pain/tenderness   [] Edema/swelling   [] Decreased coordination (fine/gross motor)   [] Impaired body mechanics  [] Sensory loss  [] Loss of balance   [] Other:      Performance Deficits (to be addressed in plan of care):   [] Bathing    [] Household Tasks (cooking/cleaning)   [] Dressing    [] Self Feeding   [] Grooming    [x] Work/Education   [] Functional Mobility   [] Sleeping/Rest   [] Toileting/Hygiene   [] Recreational Activities-golf   [] Driving    [] Community/Social Participation   [] Other:     Rehab Potential:   [] Excellent [x] Good [] Fair  [] Poor     Barriers affecting rehab potential:  []Age    []Lack of Motivation   []Co-Morbidities  []Cognitive Function  []Environmental/home/work barriers  []Other: Tolerance of evaluation/treatment:    [] Excellent [x] Good [] Fair  [] Poor    PLAN OF CARE:  Interventions:   [x] Therapeutic Exercise [x] Therapeutic Activity    [x] Activities of Daily Living [x] Neuromuscular Re-education      [x] Patient Education  [x] Manual Therapy      [x] Modalities as needed, and not otherwise contraindicated, including: ultrasound,paraffin,moist heat/cold pack, electrical stimulation, contrast bath, iontophoresis  [] Splinting    Frequency/Duration:  1 days per week for 6 weeks    GOALS:  Short Term Goals: To be achieved in: 2 weeks  1. Independent in HEP and progression per patient tolerance, in order to prevent re-injury. 2. Patient will have a decrease in pain to facilitate improvement in movement, function, and ADLs as indicated by Functional Deficits. Long Term Goals to be achieved in 6  weeks, including patient directed goals to address identified performance deficits:  1) Pt to be independent in graded HEP progression with a good level of effort and compliance. 2) Pt to report a score of 15 % or less on the Quick DASH disability questionnaire for increased performance with carrying, moving, and handling objects. 3) Pt will demonstrate increased ROM to wrist equal to right for improved ability to play golf. 4) Pt will demonstrate increased strength to  60% of right  for improved ability to lift boxes at work. 5) Pt will have a decrease in pain to 1/10  to facilitate improvement in ability to play golf and resume regular job duties.      OCCUPATIONAL THERAPY EVALUATION COMPLEXITY JUSTIFICATION:    [x] An occupational profile and medical/therapy history, which includes:   [x] a brief history including medical and/or therapy records relating to the     presenting problem   [] an expanded review of medical and/or therapy records and additional review     of physical, cognitive or psychosocial history related to current functional    performance   [] an extensive additional review of review of medical and/or therapy records   and physical, cognitive, or psychosocial history related to current    functional performance    [x] An assessment that identifies performance deficits (relating to physical, cognitive, or psychosocial skills) that result in activity limitations and/or participation restrictions:   [x] 1-3 performance deficits   [] 3-5 performance deficits   [] 5 or more performance deficits    [x] Clinical decision making of:   [x] low complexity, including analysis of occupational profile, data analysis from problem focused assessment, and consideration of a limited number of treatment options. No comorbidities affect occupational performance. No task modifications or assistance needed to complete evaluation. [] moderate complexity, including analysis of occupational profile, data analysis from detailed assessment and consideration of several treatment options. Comorbidities that affect occupational performance may be present. Minimal to moderate task modifications or assistance needed to complete assessment. [] high complexity, including analysis of occupational profile, analysis of data from comprehensive assessment and consideration of multiple treatment options. Multiple comorbidities present that affect occupational performance. Significant task modifications or assistance needed to complete assessment.     Evaluation Code:  [x] Low Complexity EVAL 06588 (typically 30 minutes face to face)  [] Mod Complexity EVAL 52308 (typically 45 minutes face to face)  [] High Complexity EVAL 39847 (typically 60 minutes face to face)    Electronically signed by:  Eamon BUTTERFIELD/JESUS, CHT [id number]

## 2017-10-13 ENCOUNTER — OFFICE VISIT (OUTPATIENT)
Dept: ORTHOPEDIC SURGERY | Age: 72
End: 2017-10-13

## 2017-10-13 DIAGNOSIS — R22.32 MASS OF LEFT WRIST: Primary | ICD-10-CM

## 2017-10-13 DIAGNOSIS — G56.22 ULNAR NEUROPATHY OF LEFT UPPER EXTREMITY: ICD-10-CM

## 2017-10-13 PROCEDURE — 99024 POSTOP FOLLOW-UP VISIT: CPT | Performed by: ORTHOPAEDIC SURGERY

## 2017-10-13 NOTE — LETTER
[employer] [place] [address]  Phone: [phone]  Fax: [address] [place] MD        October 13, 2017     Patient: Brad Adams   YOB: 1945   Date of Visit: 10/13/2017       To Whom It May Concern: It is my medical opinion that Connie Sandoval may return to light duty immediately with the following restrictions: lifting/carrying not to exceed 5 lbs. for the next 3 weeks. If you have any questions or concerns, please don't hesitate to call.     Sincerely,              Jonah King MD

## 2017-10-19 ENCOUNTER — HOSPITAL ENCOUNTER (OUTPATIENT)
Dept: OCCUPATIONAL THERAPY | Age: 72
Discharge: HOME | End: 2017-10-19
Admitting: ORTHOPAEDIC SURGERY

## 2017-10-19 NOTE — FLOWSHEET NOTE
3/10    Quick DASH 34%    Digits tip to DPFC in cm WNL    Thumb ROM WNL    Thumb opposition  WNL    Thumb Radial/Palmar abd ROM WNL    Wrist ROM Ext/Flex R:75/70  L:75/50   L: 75/70   Rad/Uln dev ROM Not assessed    Forearm ROM  Sup/pron WNL    Elbow ROM Ext/flex No reported deficits    Shoulder Flex  Shoulder Abd  Shoulder IR/ER WNL    Edema in cm circumf. wrist R:16.9  L:17.7 16.9  17.3    strength in lbs R:  L:deferred R: 80  L: 65   Pinch Strengthin lbs: lat  R:  L: 18  6   Pinch Strength in lbs:  3 point R:  L: 19  7   MMT:          Modalities: 10/11/17   10/19/17       HP 10 10              Therapeutic Exercise & Activities:        PROM wrist HEP prom      Foam roll  Pinch, , intrinsic strengthening                                                                        Therapeutic Exercise and NMR EXR  [x] (08057) Provided verbal/tactile cueing for activities related to strengthening, flexibility, endurance, ROM  for improvements in scapular, scapulothoracic and UE control with self care, reaching, carrying, lifting, house/yardwork, driving/computer work.    [] (47215) Provided verbal/tactile cueing for activities related to improving balance, coordination, kinesthetic sense, posture, motor skill, proprioception  to assist with  scapular, scapulothoracic and UE control with self care, reaching, carrying, lifting, house/yardwork, driving/computer work. Therapeutic Activities:    [] ( 31080) therapeutic activities, direct (one on one) patient contact. Use of dynamic activities to improve functional performance.     Activities of Daily Living:  [] (83995) Provided self-care/home management training (i.e., activities of daily living and compensatory training, meal preparation, safety procedures, and instructions in use of assistive technology devices/adaptive equipment)     Home Exercise Program:  Pinch strengthening with foam. 10/20/17   [x] [phone]) Reviewed/Progressed HEP activities related to

## 2017-11-01 ENCOUNTER — HOSPITAL ENCOUNTER (OUTPATIENT)
Dept: OCCUPATIONAL THERAPY | Age: 72
Discharge: HOME | End: 2017-11-03
Attending: ORTHOPAEDIC SURGERY | Admitting: ORTHOPAEDIC SURGERY

## 2017-11-02 ENCOUNTER — HOSPITAL ENCOUNTER (OUTPATIENT)
Dept: OCCUPATIONAL THERAPY | Age: 72
Discharge: HOME | End: 2017-11-02
Admitting: ORTHOPAEDIC SURGERY

## 2017-11-02 NOTE — FLOWSHEET NOTE
[facility], [address] [place] [address] [place]  Phone: [phone]  Fax [phone]  Hand Therapy Daily Treatment Note  Date:  2017    Patient: Brad Adams   : 1945   MRN: [id number]  Referring Physician: Referring Practitioner: Lara Vidal    Medical Diagnosis Information:   Diagnosis: R22.32 (ICD-10-CM) - Mass of left wrist; G56.22 (ICD-10-CM) - Ulnar neuropathy of left upper extremity   Treatment Diagnosis: wrist stiffness L M25.642, L elbow stiffness M25.622-S/P EXCISION LEFT FOREARM DEEP MASS 10-4-17     Date of injury:gradual onset over past 2 months  Date of Surgery/Type of procedure:     Excision of mass in forearm                                 Insurance information:OT Insurance Information: [facility] $25 copay   Comorbidities Affecting Functional Performance:     []Anxiety (F41.9)/Depression (F32.9)   []Diabetes Type 1(E10.65) or 2 (E11.65)   []Rheumatoid Arthritis (M05.9)  []Fibromyalgia (M79.7)  []Neuropathy(G60.9)  []Osteoarthritis(M19.91)  [x]None   []Other:    G-code: OT G-codes  Score: 14  Functional Limitation: Carrying, moving and handling objects  Carrying, Moving and Handling Objects Current Status (): At least 1 percent but less than 20 percent impaired, limited or restricted  Carrying, Moving and Handling Objects Goal Status (): At least 1 percent but less than 20 percent impaired, limited or restricted  Carrying, Moving and Handling Objects Discharge Status ():  At least 1 percent but less than 20 percent impaired, limited or restricted    Date of Patient follow up with Physician:  Preferred Language for Healthcare:   [x]English       []other:  Latex Allergy:  [x]NO      []YES  RESTRICTIONS/PRECAUTIONS:  NONE  Progress Note: [x]  Yes  []  No  Next due by : Visit #10       Visit # Insurance Allowable   3 $25 copay     Pain level: 0 /10     SUBJECTIVE:  Reports \"It's getting better, I scapulothoracic and UE control with self care, reaching, carrying, lifting, house/yardwork, driving/computer work. Therapeutic Activities:    [] ( 07941) therapeutic activities, direct (one on one) patient contact. Use of dynamic activities to improve functional performance.     Activities of Daily Living:  [] (27298) Provided self-care/home management training (i.e., activities of daily living and compensatory training, meal preparation, safety procedures, and instructions in use of assistive technology devices/adaptive equipment)     Home Exercise Program:  Pinch strengthening with foam. 10/20/17   [] [phone]) Reviewed/Progressed HEP activities related to strengthening, flexibility, endurance, ROM of scapular, scapulothoracic and UE control with self care, reaching, carrying, lifting, house/yardwork, driving/computer work    Manual Treatments: scar and volar forearm soft tissue mobs  [x] ([id number]) Provided manual therapy to mobilize soft tissue/joints of the UE for the purpose of modulating pain, promoting relaxation,  increasing ROM, reducing/eliminating soft tissue swelling/inflammation/restriction, improving soft tissue extensibility and allowing for proper ROM for normal function with self care, reaching, carrying, lifting, house/yardwork, driving/computer work    Splinting:  [] Fabrication of: L-code  [] (74066) Checkout for orthotic/prosthetic use, established patient   [] (04650) Orthotic management and training (fitting and assessment)  [] Comments:    Charges:  Timed Code Treatment Minutes: 40   Total Treatment Minutes: 40   [] EVAL (LOW) 30371   [] OT Re-eval (99069)  [] EVAL (MOD) 45368   [] EVAL (HIGH) 09317       [x] Preston (16315) x  2   [] SYSFC(25689)  [] NMR (52297) x      [] Estim (attended) (03380)   [x] Manual ([id number]) x  1    [] US (78517)  [] TA (21678) x      [] Paraffin (54353)  [] ADL  (32403) x     [] Splint/L code:    [] Estim (unattended) (82515)  [] Other:  [](66644) Checkout for orthotic use, established patient x       [] (42587) Orthotic mgmt & training  x        GOALS:Short Term Goals: To be achieved in: 2 weeks  1. Independent in HEP and progression per patient tolerance, in order to prevent re-injury. 2. Patient will have a decrease in pain to facilitate improvement in movement, function, and ADLs as indicated by Functional Deficits.     Long Term Goals to be achieved in 6  weeks, including patient directed goals to address identified performance deficits:  1) Pt to be independent in graded HEP progression with a good level of effort and compliance. -met  2) Pt to report a score of 15 % or less on the Quick DASH disability questionnaire for increased performance with carrying, moving, and handling objects.-met  3) Pt will demonstrate increased ROM to wrist equal to right for improved ability to play golf. -met  4) Pt will demonstrate increased strength to  60% of right  for improved ability to lift boxes at work.-met   5) Pt will have a decrease in pain to 1/10  to facilitate improvement in ability to play golf and resume regular job duties. -met      Progression Towards Functional goals:  [x] Patient is progressing as expected towards functional goals listed. [] Progression is slowed due to complexities listed. [] Progression has been slowed due to co-morbidities.   [] Plan just implemented, too soon to assess goals progression  [] Other:     ASSESSMENT:  All initial goals met    Treatment/Activity Tolerance:  [x] Patient tolerated treatment well [] Patient limited by fatique  [] Patient limited by pain  [] Patient limited by other medical complications  [] Other:     Prognosis: [x] Good [] Fair  [] Poor    Patient Requires Follow-up: [] Yes  [x] No    PLAN: Recommend Occupational Therapy 1 times a week for 6 weeks (follow up in 2 weeks and re-assess)  [] Continue per plan of care [] Alter current plan (see comments)  [] Plan of care initiated [] Hold pending MD visit [x] Discharge    Electronically signed by: Radha BUTTERFIELD/JESUS, [address] [place] [address] [id number]

## 2017-11-02 NOTE — DISCHARGE SUMMARY
[facility], [address] [place]  Phone: [phone]  Fax [phone]  Hand Therapy Discharge Note  Date:  2017     Patient: Andrés Valenzuela                                                            : 1945                                                              MRN: [id number]  Referring Physician: Referring Practitioner: Lennie Pillai                                              Medical Diagnosis Information:   Diagnosis: R22.32 (ICD-10-CM) - Mass of left wrist; G56.22 (ICD-10-CM) - Ulnar neuropathy of left upper extremity                        Treatment Diagnosis: wrist stiffness L M25.642, L elbow stiffness M25.622-S/P EXCISION LEFT FOREARM DEEP MASS 10-4-17     Date of injury:gradual onset over past 2 months  Date of Surgery/Type of procedure:     Excision of mass in forearm                                 Insurance information:OT Insurance Information: [facility] $25 copay   Comorbidities Affecting Functional Performance:                       []Anxiety (F41.9)/Depression (F32.9)               []Diabetes Type 1(E10.65) or 2 (E11.65)                     []Rheumatoid Arthritis (M05.9)  []Fibromyalgia (M79.7)  []Neuropathy(G60.9)  []Osteoarthritis(M19.91)  [x]None                []Other:     G-code: OT G-codes  Score: 14  Functional Limitation: Carrying, moving and handling objects  Carrying, Moving and Handling Objects Current Status (): At least 1 percent but less than 20 percent impaired, limited or restricted  Carrying, Moving and Handling Objects Goal Status (): At least 1 percent but less than 20 percent impaired, limited or restricted  Carrying, Moving and Handling Objects Discharge Status ():  At least 1 percent but less than 20 percent impaired, limited or restricted    Visit # Insurance Allowable   3 $25 copay    from 17 to 17     Pain level: 0 /10    SUBJECTIVE:  Reports \"It's getting better, I can hold a toothbrush now\".     Scar at wrist is \"a little hypersensitive to touch, like with my shirt sleeve\" but is not having pain, ulnar half of ring finger still numb. Small finger is almost back to normal.        OBJECTIVE:    Date:  Hand Dominance:     [x]  Right    [] Left 10/11/2017 10/19/17  11/2/17    Objective Measures:         PAIN 3/10   0/10   Quick DASH 34%   14%   Digits tip to DPFC in cm WNL       Thumb ROM WNL       Thumb opposition  WNL       Thumb Radial/Palmar abd ROM WNL       Wrist ROM Ext/Flex R:75/70  L:75/50    L: 75/70 75/70  75/70   Rad/Uln dev ROM Not assessed       Forearm ROM  Sup/pron WNL       Elbow ROM Ext/flex No reported deficits       Shoulder Flex  Shoulder Abd  Shoulder IR/ER WNL       Edema in cm circumf. wrist R:16.9  L:17.7 16.9  17.3 16.9  17.0    strength in lbs R:  L:deferred R: 80  L: 65 R:86  L:86   Pinch Strengthin lbs: lat  R:  L: 18  6 18  7   Pinch Strength in lbs:  3 point R:  L: 19  7 20  9   MMT:           GOALS:Short Term Goals: To be achieved in: 2 weeks  1. Independent in HEP and progression per patient tolerance, in order to prevent re-injury. 2. Patient will have a decrease in pain to facilitate improvement in movement, function, and ADLs as indicated by Functional Deficits.     Long Term Goals to be achieved in [facility], including patient directed goals to address identified performance deficits:  1) Pt to be independent in graded HEP progression with a good level of effort and compliance. -met  2) Pt to report a score of 15 % or less on the Quick DASH disability questionnaire for increased performance with carrying, moving, and handling objects.-met  3) Pt will demonstrate increased ROM to wrist equal to right for improved ability to play golf. -met  4) Pt will demonstrate increased strength to  60% of right  for improved ability to lift boxes at work.-met   5) Pt will have a decrease in pain to

## 2018-01-03 ENCOUNTER — OFFICE VISIT (OUTPATIENT)
Dept: FAMILY MEDICINE CLINIC | Age: 73
End: 2018-01-03

## 2018-01-03 VITALS
WEIGHT: 144 LBS | TEMPERATURE: 98 F | SYSTOLIC BLOOD PRESSURE: 139 MMHG | BODY MASS INDEX: 23.96 KG/M2 | HEART RATE: 63 BPM | DIASTOLIC BLOOD PRESSURE: 79 MMHG | RESPIRATION RATE: 16 BRPM

## 2018-01-03 DIAGNOSIS — M79.641 PAIN OF RIGHT HAND: Primary | ICD-10-CM

## 2018-01-03 PROCEDURE — 1036F TOBACCO NON-USER: CPT | Performed by: FAMILY MEDICINE

## 2018-01-03 PROCEDURE — 3017F COLORECTAL CA SCREEN DOC REV: CPT | Performed by: FAMILY MEDICINE

## 2018-01-03 PROCEDURE — G8420 CALC BMI NORM PARAMETERS: HCPCS | Performed by: FAMILY MEDICINE

## 2018-01-03 PROCEDURE — 1123F ACP DISCUSS/DSCN MKR DOCD: CPT | Performed by: FAMILY MEDICINE

## 2018-01-03 PROCEDURE — G8427 DOCREV CUR MEDS BY ELIG CLIN: HCPCS | Performed by: FAMILY MEDICINE

## 2018-01-03 PROCEDURE — 99213 OFFICE O/P EST LOW 20 MIN: CPT | Performed by: FAMILY MEDICINE

## 2018-01-03 PROCEDURE — 4040F PNEUMOC VAC/ADMIN/RCVD: CPT | Performed by: FAMILY MEDICINE

## 2018-01-03 PROCEDURE — G8484 FLU IMMUNIZE NO ADMIN: HCPCS | Performed by: FAMILY MEDICINE

## 2018-01-03 RX ORDER — SILDENAFIL 100 MG/1
TABLET, FILM COATED ORAL
Qty: 6 TABLET | Refills: 5 | Status: SHIPPED | OUTPATIENT
Start: 2018-01-03 | End: 2018-11-24 | Stop reason: SDUPTHER

## 2018-01-10 RX ORDER — LISINOPRIL AND HYDROCHLOROTHIAZIDE 12.5; 1 MG/1; MG/1
TABLET ORAL
Qty: 90 TABLET | Refills: 3 | Status: SHIPPED | OUTPATIENT
Start: 2018-01-10 | End: 2019-01-06 | Stop reason: SDUPTHER

## 2018-04-03 ENCOUNTER — TELEPHONE (OUTPATIENT)
Dept: FAMILY MEDICINE CLINIC | Age: 73
End: 2018-04-03

## 2018-04-03 ENCOUNTER — OFFICE VISIT (OUTPATIENT)
Dept: FAMILY MEDICINE CLINIC | Age: 73
End: 2018-04-03

## 2018-04-03 VITALS
BODY MASS INDEX: 23.3 KG/M2 | SYSTOLIC BLOOD PRESSURE: 110 MMHG | DIASTOLIC BLOOD PRESSURE: 74 MMHG | HEIGHT: 66 IN | WEIGHT: 145 LBS | HEART RATE: 77 BPM | OXYGEN SATURATION: 98 %

## 2018-04-03 DIAGNOSIS — I10 ESSENTIAL HYPERTENSION: Primary | ICD-10-CM

## 2018-04-03 DIAGNOSIS — R42 EPISODE OF DIZZINESS: ICD-10-CM

## 2018-04-03 DIAGNOSIS — I10 ESSENTIAL HYPERTENSION: ICD-10-CM

## 2018-04-03 LAB
ANION GAP SERPL CALCULATED.3IONS-SCNC: 16 MMOL/L (ref 3–16)
BASOPHILS ABSOLUTE: 0.1 K/UL (ref 0–0.2)
BASOPHILS RELATIVE PERCENT: 0.8 %
BUN BLDV-MCNC: 14 MG/DL (ref 7–20)
CALCIUM SERPL-MCNC: 9.2 MG/DL (ref 8.3–10.6)
CHLORIDE BLD-SCNC: 101 MMOL/L (ref 99–110)
CO2: 25 MMOL/L (ref 21–32)
CREAT SERPL-MCNC: 1.1 MG/DL (ref 0.8–1.3)
EOSINOPHILS ABSOLUTE: 0.1 K/UL (ref 0–0.6)
EOSINOPHILS RELATIVE PERCENT: 2.5 %
GFR AFRICAN AMERICAN: >60
GFR NON-AFRICAN AMERICAN: >60
GLUCOSE BLD-MCNC: 78 MG/DL (ref 70–99)
HCT VFR BLD CALC: 42.4 % (ref 40.5–52.5)
HEMOGLOBIN: 14.6 G/DL (ref 13.5–17.5)
LYMPHOCYTES ABSOLUTE: 2 K/UL (ref 1–5.1)
LYMPHOCYTES RELATIVE PERCENT: 33.5 %
MCH RBC QN AUTO: 31.7 PG (ref 26–34)
MCHC RBC AUTO-ENTMCNC: 34.3 G/DL (ref 31–36)
MCV RBC AUTO: 92.2 FL (ref 80–100)
MONOCYTES ABSOLUTE: 0.7 K/UL (ref 0–1.3)
MONOCYTES RELATIVE PERCENT: 11.4 %
NEUTROPHILS ABSOLUTE: 3.1 K/UL (ref 1.7–7.7)
NEUTROPHILS RELATIVE PERCENT: 51.8 %
PDW BLD-RTO: 13.6 % (ref 12.4–15.4)
PLATELET # BLD: 186 K/UL (ref 135–450)
PMV BLD AUTO: 8.6 FL (ref 5–10.5)
POTASSIUM SERPL-SCNC: 4 MMOL/L (ref 3.5–5.1)
RBC # BLD: 4.6 M/UL (ref 4.2–5.9)
SEDIMENTATION RATE, ERYTHROCYTE: 37 MM/HR (ref 0–20)
SODIUM BLD-SCNC: 142 MMOL/L (ref 136–145)
TSH REFLEX: 3.99 UIU/ML (ref 0.27–4.2)
WBC # BLD: 6 K/UL (ref 4–11)

## 2018-04-03 PROCEDURE — G8427 DOCREV CUR MEDS BY ELIG CLIN: HCPCS | Performed by: FAMILY MEDICINE

## 2018-04-03 PROCEDURE — 1123F ACP DISCUSS/DSCN MKR DOCD: CPT | Performed by: FAMILY MEDICINE

## 2018-04-03 PROCEDURE — 3017F COLORECTAL CA SCREEN DOC REV: CPT | Performed by: FAMILY MEDICINE

## 2018-04-03 PROCEDURE — 99213 OFFICE O/P EST LOW 20 MIN: CPT | Performed by: FAMILY MEDICINE

## 2018-04-03 PROCEDURE — 4040F PNEUMOC VAC/ADMIN/RCVD: CPT | Performed by: FAMILY MEDICINE

## 2018-04-03 PROCEDURE — 1036F TOBACCO NON-USER: CPT | Performed by: FAMILY MEDICINE

## 2018-04-03 PROCEDURE — G8420 CALC BMI NORM PARAMETERS: HCPCS | Performed by: FAMILY MEDICINE

## 2018-04-03 ASSESSMENT — ENCOUNTER SYMPTOMS
WHEEZING: 0
SHORTNESS OF BREATH: 0
COUGH: 0

## 2018-04-16 ENCOUNTER — TELEPHONE (OUTPATIENT)
Dept: FAMILY MEDICINE CLINIC | Age: 73
End: 2018-04-16

## 2018-04-16 DIAGNOSIS — R42 EQUILIBRIUM DISORDER: Primary | ICD-10-CM

## 2018-04-18 ENCOUNTER — TELEPHONE (OUTPATIENT)
Dept: FAMILY MEDICINE CLINIC | Age: 73
End: 2018-04-18

## 2018-04-18 DIAGNOSIS — R42 EQUILIBRIUM DISORDER: Primary | ICD-10-CM

## 2018-04-23 ENCOUNTER — HOSPITAL ENCOUNTER (OUTPATIENT)
Dept: MRI IMAGING | Age: 73
Discharge: HOME | End: 2018-04-23
Attending: FAMILY MEDICINE | Admitting: FAMILY MEDICINE

## 2018-04-23 DIAGNOSIS — R42 DIZZINESS AND GIDDINESS: ICD-10-CM

## 2018-04-23 DIAGNOSIS — R42 EQUILIBRIUM DISORDER: ICD-10-CM

## 2018-04-26 ENCOUNTER — TELEPHONE (OUTPATIENT)
Dept: FAMILY MEDICINE CLINIC | Age: 73
End: 2018-04-26

## 2018-06-15 DIAGNOSIS — Z00.00 ANNUAL PHYSICAL EXAM: Primary | ICD-10-CM

## 2018-06-15 DIAGNOSIS — Z00.00 ANNUAL PHYSICAL EXAM: ICD-10-CM

## 2018-06-15 LAB
ALBUMIN SERPL-MCNC: 4.1 G/DL (ref 3.4–5)
ALP BLD-CCNC: 80 U/L (ref 40–129)
ALT SERPL-CCNC: <5 U/L (ref 10–40)
ANION GAP SERPL CALCULATED.3IONS-SCNC: 17 MMOL/L (ref 3–16)
AST SERPL-CCNC: 16 U/L (ref 15–37)
BASOPHILS ABSOLUTE: 0 K/UL (ref 0–0.2)
BASOPHILS RELATIVE PERCENT: 0.6 %
BILIRUB SERPL-MCNC: 0.5 MG/DL (ref 0–1)
BILIRUBIN DIRECT: <0.2 MG/DL (ref 0–0.3)
BILIRUBIN, INDIRECT: ABNORMAL MG/DL (ref 0–1)
BUN BLDV-MCNC: 20 MG/DL (ref 7–20)
CALCIUM SERPL-MCNC: 9.2 MG/DL (ref 8.3–10.6)
CHLORIDE BLD-SCNC: 96 MMOL/L (ref 99–110)
CHOLESTEROL, TOTAL: 171 MG/DL (ref 0–199)
CO2: 25 MMOL/L (ref 21–32)
CREAT SERPL-MCNC: 1.2 MG/DL (ref 0.8–1.3)
EOSINOPHILS ABSOLUTE: 0.1 K/UL (ref 0–0.6)
EOSINOPHILS RELATIVE PERCENT: 1.8 %
GFR AFRICAN AMERICAN: >60
GFR NON-AFRICAN AMERICAN: 59
GLUCOSE BLD-MCNC: 119 MG/DL (ref 70–99)
HCT VFR BLD CALC: 39.6 % (ref 40.5–52.5)
HDLC SERPL-MCNC: 49 MG/DL (ref 40–60)
HEMOGLOBIN: 13.7 G/DL (ref 13.5–17.5)
LDL CHOLESTEROL CALCULATED: 102 MG/DL
LYMPHOCYTES ABSOLUTE: 1.6 K/UL (ref 1–5.1)
LYMPHOCYTES RELATIVE PERCENT: 34.3 %
MCH RBC QN AUTO: 32.4 PG (ref 26–34)
MCHC RBC AUTO-ENTMCNC: 34.6 G/DL (ref 31–36)
MCV RBC AUTO: 93.7 FL (ref 80–100)
MONOCYTES ABSOLUTE: 0.4 K/UL (ref 0–1.3)
MONOCYTES RELATIVE PERCENT: 9.4 %
NEUTROPHILS ABSOLUTE: 2.5 K/UL (ref 1.7–7.7)
NEUTROPHILS RELATIVE PERCENT: 53.9 %
PDW BLD-RTO: 14 % (ref 12.4–15.4)
PLATELET # BLD: 168 K/UL (ref 135–450)
PMV BLD AUTO: 8.4 FL (ref 5–10.5)
POTASSIUM SERPL-SCNC: 3.8 MMOL/L (ref 3.5–5.1)
RBC # BLD: 4.23 M/UL (ref 4.2–5.9)
SODIUM BLD-SCNC: 138 MMOL/L (ref 136–145)
TOTAL PROTEIN: 6.8 G/DL (ref 6.4–8.2)
TRIGL SERPL-MCNC: 100 MG/DL (ref 0–150)
VLDLC SERPL CALC-MCNC: 20 MG/DL
WBC # BLD: 4.7 K/UL (ref 4–11)

## 2018-06-25 ENCOUNTER — OFFICE VISIT (OUTPATIENT)
Dept: FAMILY MEDICINE CLINIC | Age: 73
End: 2018-06-25

## 2018-06-25 VITALS
OXYGEN SATURATION: 98 % | HEART RATE: 62 BPM | WEIGHT: 145 LBS | HEIGHT: 66 IN | SYSTOLIC BLOOD PRESSURE: 104 MMHG | BODY MASS INDEX: 23.3 KG/M2 | DIASTOLIC BLOOD PRESSURE: 62 MMHG

## 2018-06-25 DIAGNOSIS — I10 ESSENTIAL HYPERTENSION: ICD-10-CM

## 2018-06-25 DIAGNOSIS — Z23 NEED FOR PNEUMOCOCCAL VACCINATION: ICD-10-CM

## 2018-06-25 DIAGNOSIS — Z00.00 ANNUAL PHYSICAL EXAM: Primary | ICD-10-CM

## 2018-06-25 PROCEDURE — 99397 PER PM REEVAL EST PAT 65+ YR: CPT | Performed by: FAMILY MEDICINE

## 2018-06-25 PROCEDURE — 90670 PCV13 VACCINE IM: CPT | Performed by: FAMILY MEDICINE

## 2018-06-25 PROCEDURE — G0009 ADMIN PNEUMOCOCCAL VACCINE: HCPCS | Performed by: FAMILY MEDICINE

## 2018-06-25 ASSESSMENT — ENCOUNTER SYMPTOMS
SINUS PRESSURE: 0
TROUBLE SWALLOWING: 0
DIARRHEA: 0
BLOOD IN STOOL: 0
CONSTIPATION: 0
BACK PAIN: 0
SHORTNESS OF BREATH: 0
COUGH: 0
PHOTOPHOBIA: 0
ABDOMINAL PAIN: 0
SORE THROAT: 0
WHEEZING: 0

## 2018-06-25 ASSESSMENT — PATIENT HEALTH QUESTIONNAIRE - PHQ9
2. FEELING DOWN, DEPRESSED OR HOPELESS: 0
SUM OF ALL RESPONSES TO PHQ9 QUESTIONS 1 & 2: 0
SUM OF ALL RESPONSES TO PHQ QUESTIONS 1-9: 0
1. LITTLE INTEREST OR PLEASURE IN DOING THINGS: 0

## 2018-10-29 ENCOUNTER — HOSPITAL ENCOUNTER (OUTPATIENT)
Dept: GENERAL RADIOLOGY | Age: 73
Discharge: HOME | End: 2018-10-29
Payer: MEDICARE

## 2018-10-29 ENCOUNTER — OFFICE VISIT (OUTPATIENT)
Dept: FAMILY MEDICINE CLINIC | Age: 73
End: 2018-10-29
Payer: MEDICARE

## 2018-10-29 ENCOUNTER — TELEPHONE (OUTPATIENT)
Dept: FAMILY MEDICINE CLINIC | Age: 73
End: 2018-10-29

## 2018-10-29 VITALS
BODY MASS INDEX: 23.3 KG/M2 | OXYGEN SATURATION: 98 % | HEART RATE: 56 BPM | WEIGHT: 145 LBS | HEIGHT: 66 IN | SYSTOLIC BLOOD PRESSURE: 128 MMHG | RESPIRATION RATE: 14 BRPM | DIASTOLIC BLOOD PRESSURE: 68 MMHG

## 2018-10-29 DIAGNOSIS — M70.51 BURSITIS OF OTHER BURSA OF RIGHT KNEE: ICD-10-CM

## 2018-10-29 DIAGNOSIS — M25.561 RIGHT KNEE PAIN, UNSPECIFIED CHRONICITY: ICD-10-CM

## 2018-10-29 DIAGNOSIS — M25.561 ACUTE PAIN OF RIGHT KNEE: Primary | ICD-10-CM

## 2018-10-29 DIAGNOSIS — M25.561 RIGHT KNEE PAIN, UNSPECIFIED CHRONICITY: Primary | ICD-10-CM

## 2018-10-29 PROCEDURE — 99214 OFFICE O/P EST MOD 30 MIN: CPT | Performed by: NURSE PRACTITIONER

## 2018-10-29 PROCEDURE — 73560 X-RAY EXAM OF KNEE 1 OR 2: CPT

## 2018-10-29 PROCEDURE — G8510 SCR DEP NEG, NO PLAN REQD: HCPCS | Performed by: NURSE PRACTITIONER

## 2018-10-29 RX ORDER — MELOXICAM 7.5 MG/1
7.5 TABLET ORAL DAILY
Qty: 30 TABLET | Refills: 3 | Status: SHIPPED | OUTPATIENT
Start: 2018-10-29 | End: 2018-11-19 | Stop reason: ALTCHOICE

## 2018-10-29 RX ORDER — INFLUENZA A VIRUS A/MICHIGAN/45/2015 X-275 (H1N1) ANTIGEN (FORMALDEHYDE INACTIVATED), INFLUENZA A VIRUS A/SINGAPORE/INFIMH-16-0019/2016 IVR-186 (H3N2) ANTIGEN (FORMALDEHYDE INACTIVATED), AND INFLUENZA B VIRUS B/MARYLAND/15/2016 BX-69A (A B/COLORADO/6/2017-LIKE VIRUS) ANTIGEN (FORMALDEHYDE INACTIVATED) 60; 60; 60 UG/.5ML; UG/.5ML; UG/.5ML
INJECTION, SUSPENSION INTRAMUSCULAR
COMMUNITY
Start: 2018-10-10 | End: 2018-10-29

## 2018-10-29 ASSESSMENT — ENCOUNTER SYMPTOMS
EYES NEGATIVE: 1
RESPIRATORY NEGATIVE: 1
GASTROINTESTINAL NEGATIVE: 1

## 2018-10-29 ASSESSMENT — PATIENT HEALTH QUESTIONNAIRE - PHQ9
1. LITTLE INTEREST OR PLEASURE IN DOING THINGS: 0
SUM OF ALL RESPONSES TO PHQ QUESTIONS 1-9: 0
SUM OF ALL RESPONSES TO PHQ9 QUESTIONS 1 & 2: 0
2. FEELING DOWN, DEPRESSED OR HOPELESS: 0
SUM OF ALL RESPONSES TO PHQ QUESTIONS 1-9: 0

## 2018-11-05 ENCOUNTER — OFFICE VISIT (OUTPATIENT)
Dept: ORTHOPEDIC SURGERY | Age: 73
End: 2018-11-05
Payer: MEDICARE

## 2018-11-05 VITALS — WEIGHT: 140 LBS | BODY MASS INDEX: 22.5 KG/M2 | HEIGHT: 66 IN

## 2018-11-05 DIAGNOSIS — M25.561 RIGHT KNEE PAIN, UNSPECIFIED CHRONICITY: Primary | ICD-10-CM

## 2018-11-05 PROCEDURE — G8484 FLU IMMUNIZE NO ADMIN: HCPCS | Performed by: ORTHOPAEDIC SURGERY

## 2018-11-05 PROCEDURE — 1101F PT FALLS ASSESS-DOCD LE1/YR: CPT | Performed by: ORTHOPAEDIC SURGERY

## 2018-11-05 PROCEDURE — 99213 OFFICE O/P EST LOW 20 MIN: CPT | Performed by: ORTHOPAEDIC SURGERY

## 2018-11-05 PROCEDURE — G8427 DOCREV CUR MEDS BY ELIG CLIN: HCPCS | Performed by: ORTHOPAEDIC SURGERY

## 2018-11-05 PROCEDURE — 1036F TOBACCO NON-USER: CPT | Performed by: ORTHOPAEDIC SURGERY

## 2018-11-05 PROCEDURE — 1123F ACP DISCUSS/DSCN MKR DOCD: CPT | Performed by: ORTHOPAEDIC SURGERY

## 2018-11-05 PROCEDURE — 4040F PNEUMOC VAC/ADMIN/RCVD: CPT | Performed by: ORTHOPAEDIC SURGERY

## 2018-11-05 PROCEDURE — G8420 CALC BMI NORM PARAMETERS: HCPCS | Performed by: ORTHOPAEDIC SURGERY

## 2018-11-05 PROCEDURE — 3017F COLORECTAL CA SCREEN DOC REV: CPT | Performed by: ORTHOPAEDIC SURGERY

## 2018-11-19 ENCOUNTER — HOSPITAL ENCOUNTER (INPATIENT)
Age: 73
LOS: 2 days | Discharge: HOME | DRG: 312 | End: 2018-11-21
Attending: EMERGENCY MEDICINE | Admitting: INTERNAL MEDICINE
Payer: MEDICARE

## 2018-11-19 ENCOUNTER — APPOINTMENT (OUTPATIENT)
Dept: CT IMAGING | Age: 73
DRG: 312 | End: 2018-11-19
Payer: MEDICARE

## 2018-11-19 ENCOUNTER — APPOINTMENT (OUTPATIENT)
Dept: GENERAL RADIOLOGY | Age: 73
DRG: 312 | End: 2018-11-19
Payer: MEDICARE

## 2018-11-19 DIAGNOSIS — R55 SYNCOPE AND COLLAPSE: Primary | ICD-10-CM

## 2018-11-19 DIAGNOSIS — S16.1XXA ACUTE STRAIN OF NECK MUSCLE, INITIAL ENCOUNTER: ICD-10-CM

## 2018-11-19 LAB
A/G RATIO: 1.1 (ref 1.1–2.2)
ALBUMIN SERPL-MCNC: 3.9 G/DL (ref 3.4–5)
ALP BLD-CCNC: 79 U/L (ref 40–129)
ALT SERPL-CCNC: 7 U/L (ref 10–40)
ANION GAP SERPL CALCULATED.3IONS-SCNC: 15 MMOL/L (ref 3–16)
AST SERPL-CCNC: 20 U/L (ref 15–37)
BASOPHILS ABSOLUTE: 0 K/UL (ref 0–0.2)
BASOPHILS RELATIVE PERCENT: 0.4 %
BILIRUB SERPL-MCNC: 0.6 MG/DL (ref 0–1)
BUN BLDV-MCNC: 16 MG/DL (ref 7–20)
CALCIUM SERPL-MCNC: 9.5 MG/DL (ref 8.3–10.6)
CHLORIDE BLD-SCNC: 101 MMOL/L (ref 99–110)
CO2: 23 MMOL/L (ref 21–32)
CREAT SERPL-MCNC: 1.1 MG/DL (ref 0.8–1.3)
EOSINOPHILS ABSOLUTE: 0.1 K/UL (ref 0–0.6)
EOSINOPHILS RELATIVE PERCENT: 0.9 %
GFR AFRICAN AMERICAN: >60
GFR NON-AFRICAN AMERICAN: >60
GLOBULIN: 3.4 G/DL
GLUCOSE BLD-MCNC: 122 MG/DL (ref 70–99)
HCT VFR BLD CALC: 42.4 % (ref 40.5–52.5)
HEMOGLOBIN: 14.7 G/DL (ref 13.5–17.5)
INR BLD: 1.04 (ref 0.86–1.14)
LV EF: 58 %
LVEF MODALITY: NORMAL
LYMPHOCYTES ABSOLUTE: 2.7 K/UL (ref 1–5.1)
LYMPHOCYTES RELATIVE PERCENT: 37.4 %
MAGNESIUM: 1.9 MG/DL (ref 1.8–2.4)
MCH RBC QN AUTO: 31.8 PG (ref 26–34)
MCHC RBC AUTO-ENTMCNC: 34.7 G/DL (ref 31–36)
MCV RBC AUTO: 91.5 FL (ref 80–100)
MONOCYTES ABSOLUTE: 0.6 K/UL (ref 0–1.3)
MONOCYTES RELATIVE PERCENT: 8.9 %
NEUTROPHILS ABSOLUTE: 3.8 K/UL (ref 1.7–7.7)
NEUTROPHILS RELATIVE PERCENT: 52.4 %
PDW BLD-RTO: 13.6 % (ref 12.4–15.4)
PLATELET # BLD: 196 K/UL (ref 135–450)
PMV BLD AUTO: 8.4 FL (ref 5–10.5)
POTASSIUM REFLEX MAGNESIUM: 3.3 MMOL/L (ref 3.5–5.1)
PROTHROMBIN TIME: 11.8 SEC (ref 9.8–13)
RBC # BLD: 4.63 M/UL (ref 4.2–5.9)
SODIUM BLD-SCNC: 139 MMOL/L (ref 136–145)
TOTAL PROTEIN: 7.3 G/DL (ref 6.4–8.2)
TROPONIN: <0.01 NG/ML
WBC # BLD: 7.2 K/UL (ref 4–11)

## 2018-11-19 PROCEDURE — G0378 HOSPITAL OBSERVATION PER HR: HCPCS

## 2018-11-19 PROCEDURE — 85025 COMPLETE CBC W/AUTO DIFF WBC: CPT

## 2018-11-19 PROCEDURE — 93005 ELECTROCARDIOGRAM TRACING: CPT | Performed by: PHYSICIAN ASSISTANT

## 2018-11-19 PROCEDURE — 84484 ASSAY OF TROPONIN QUANT: CPT

## 2018-11-19 PROCEDURE — 1200000000 HC SEMI PRIVATE

## 2018-11-19 PROCEDURE — 6370000000 HC RX 637 (ALT 250 FOR IP): Performed by: NURSE PRACTITIONER

## 2018-11-19 PROCEDURE — 83735 ASSAY OF MAGNESIUM: CPT

## 2018-11-19 PROCEDURE — 96372 THER/PROPH/DIAG INJ SC/IM: CPT

## 2018-11-19 PROCEDURE — 71046 X-RAY EXAM CHEST 2 VIEWS: CPT

## 2018-11-19 PROCEDURE — 93010 ELECTROCARDIOGRAM REPORT: CPT | Performed by: INTERNAL MEDICINE

## 2018-11-19 PROCEDURE — 70450 CT HEAD/BRAIN W/O DYE: CPT

## 2018-11-19 PROCEDURE — 72125 CT NECK SPINE W/O DYE: CPT

## 2018-11-19 PROCEDURE — 93306 TTE W/DOPPLER COMPLETE: CPT

## 2018-11-19 PROCEDURE — 6360000002 HC RX W HCPCS: Performed by: NURSE PRACTITIONER

## 2018-11-19 PROCEDURE — 99285 EMERGENCY DEPT VISIT HI MDM: CPT

## 2018-11-19 PROCEDURE — 6370000000 HC RX 637 (ALT 250 FOR IP): Performed by: INTERNAL MEDICINE

## 2018-11-19 PROCEDURE — 85610 PROTHROMBIN TIME: CPT

## 2018-11-19 PROCEDURE — 80053 COMPREHEN METABOLIC PANEL: CPT

## 2018-11-19 RX ORDER — ONDANSETRON 2 MG/ML
4 INJECTION INTRAMUSCULAR; INTRAVENOUS EVERY 6 HOURS PRN
Status: DISCONTINUED | OUTPATIENT
Start: 2018-11-19 | End: 2018-11-21 | Stop reason: HOSPADM

## 2018-11-19 RX ORDER — POTASSIUM CHLORIDE 20 MEQ/1
20 TABLET, EXTENDED RELEASE ORAL ONCE
Status: COMPLETED | OUTPATIENT
Start: 2018-11-19 | End: 2018-11-19

## 2018-11-19 RX ORDER — SODIUM CHLORIDE 0.9 % (FLUSH) 0.9 %
10 SYRINGE (ML) INJECTION PRN
Status: DISCONTINUED | OUTPATIENT
Start: 2018-11-19 | End: 2018-11-21 | Stop reason: HOSPADM

## 2018-11-19 RX ORDER — IBUPROFEN 400 MG/1
400 TABLET ORAL EVERY 6 HOURS PRN
Status: DISCONTINUED | OUTPATIENT
Start: 2018-11-19 | End: 2018-11-21 | Stop reason: HOSPADM

## 2018-11-19 RX ORDER — ASPIRIN 81 MG/1
81 TABLET, CHEWABLE ORAL DAILY
Status: DISCONTINUED | OUTPATIENT
Start: 2018-11-20 | End: 2018-11-21 | Stop reason: HOSPADM

## 2018-11-19 RX ORDER — LISINOPRIL 10 MG/1
10 TABLET ORAL DAILY
Status: DISCONTINUED | OUTPATIENT
Start: 2018-11-20 | End: 2018-11-21 | Stop reason: HOSPADM

## 2018-11-19 RX ORDER — SODIUM CHLORIDE 0.9 % (FLUSH) 0.9 %
10 SYRINGE (ML) INJECTION EVERY 12 HOURS SCHEDULED
Status: DISCONTINUED | OUTPATIENT
Start: 2018-11-19 | End: 2018-11-21 | Stop reason: HOSPADM

## 2018-11-19 RX ADMIN — IBUPROFEN 400 MG: 400 TABLET ORAL at 17:57

## 2018-11-19 RX ADMIN — ENOXAPARIN SODIUM 40 MG: 40 INJECTION SUBCUTANEOUS at 15:33

## 2018-11-19 RX ADMIN — POTASSIUM CHLORIDE 20 MEQ: 20 TABLET, EXTENDED RELEASE ORAL at 15:34

## 2018-11-19 ASSESSMENT — ENCOUNTER SYMPTOMS
BACK PAIN: 0
DIARRHEA: 1
ABDOMINAL PAIN: 1
FACIAL SWELLING: 0
SHORTNESS OF BREATH: 0
VOMITING: 0
NAUSEA: 0
COLOR CHANGE: 0

## 2018-11-19 ASSESSMENT — PAIN DESCRIPTION - PAIN TYPE
TYPE: ACUTE PAIN
TYPE: CHRONIC PAIN

## 2018-11-19 ASSESSMENT — PAIN DESCRIPTION - LOCATION
LOCATION: BACK
LOCATION: NECK

## 2018-11-19 ASSESSMENT — PAIN SCALES - GENERAL
PAINLEVEL_OUTOF10: 1
PAINLEVEL_OUTOF10: 5
PAINLEVEL_OUTOF10: 1
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
## 2018-11-19 NOTE — H&P
Sig Start Date End Date Taking? Authorizing Provider   lisinopril-hydrochlorothiazide (PRINZIDE;ZESTORETIC) 10-12.5 MG per tablet TAKE ONE TABLET BY MOUTH DAILY FOR HIGH BLOOD PRESSURE 1/10/18  Yes Caryle Kern, DO   aspirin 81 MG EC tablet Take 81 mg by mouth daily. Yes Historical Provider, MD   sildenafil (VIAGRA) 100 MG tablet TAKE AS NEEDED FOR ERECTILE DYSFUNCTION AS DIRECTED. ONCE IN 24 HOURS 1/3/18   Caryle Kern, DO       Allergies:  Clarithromycin; Gabapentin; Pravastatin; Promethazine-codeine; Vicodin [hydrocodone-acetaminophen]; and Zegerid [omeprazole]    Social History:      The patient currently lives independently     TOBACCO:   reports that he quit smoking about 42 years ago. He has a 8.50 pack-year smoking history. He has never used smokeless tobacco.  ETOH:   reports that he does not drink alcohol. Family History:      Reviewed in detail and negative for DM, CAD, Cancer, CVA. Positive as follows:    Family History   Problem Relation Age of Onset    High Blood Pressure Mother     High Blood Pressure Father     Cancer Father         lung    High Blood Pressure Sister     High Blood Pressure Brother        REVIEW OF SYSTEMS:   Pertinent positives as noted in the HPI. All other systems reviewed and negative. PHYSICAL EXAM PERFORMED:    BP (!) 143/77   Pulse 64   Temp 98.1 °F (36.7 °C) (Oral)   Resp 18   Ht 5' 6\" (1.676 m)   Wt 141 lb 9.6 oz (64.2 kg)   SpO2 98%   BMI 22.85 kg/m²     General appearance:  No apparent distress, appears stated age and cooperative. HEENT:  Normal cephalic, atraumatic without obvious deformity. Pupils equal, round, and reactive to light. Extra ocular muscles intact. Conjunctivae/corneas clear. Neck: Supple, with full range of motion. No jugular venous distention. Trachea midline. Respiratory:  Normal respiratory effort. Clear to auscultation, bilaterally without Rales/Wheezes/Rhonchi.   Cardiovascular:  Regular rate and rhythm with normal S1/S2

## 2018-11-20 LAB
ANION GAP SERPL CALCULATED.3IONS-SCNC: 9 MMOL/L (ref 3–16)
BUN BLDV-MCNC: 16 MG/DL (ref 7–20)
CALCIUM SERPL-MCNC: 8.8 MG/DL (ref 8.3–10.6)
CHLORIDE BLD-SCNC: 100 MMOL/L (ref 99–110)
CO2: 29 MMOL/L (ref 21–32)
CREAT SERPL-MCNC: 1 MG/DL (ref 0.8–1.3)
GFR AFRICAN AMERICAN: >60
GFR NON-AFRICAN AMERICAN: >60
GLUCOSE BLD-MCNC: 108 MG/DL (ref 70–99)
POTASSIUM REFLEX MAGNESIUM: 4.2 MMOL/L (ref 3.5–5.1)
SODIUM BLD-SCNC: 138 MMOL/L (ref 136–145)

## 2018-11-20 PROCEDURE — 6370000000 HC RX 637 (ALT 250 FOR IP): Performed by: INTERNAL MEDICINE

## 2018-11-20 PROCEDURE — 6370000000 HC RX 637 (ALT 250 FOR IP): Performed by: NURSE PRACTITIONER

## 2018-11-20 PROCEDURE — 1200000000 HC SEMI PRIVATE

## 2018-11-20 PROCEDURE — 2580000003 HC RX 258: Performed by: NURSE PRACTITIONER

## 2018-11-20 PROCEDURE — 96372 THER/PROPH/DIAG INJ SC/IM: CPT

## 2018-11-20 PROCEDURE — 36415 COLL VENOUS BLD VENIPUNCTURE: CPT

## 2018-11-20 PROCEDURE — 6360000002 HC RX W HCPCS: Performed by: NURSE PRACTITIONER

## 2018-11-20 PROCEDURE — 99223 1ST HOSP IP/OBS HIGH 75: CPT | Performed by: INTERNAL MEDICINE

## 2018-11-20 PROCEDURE — G0378 HOSPITAL OBSERVATION PER HR: HCPCS

## 2018-11-20 PROCEDURE — 80048 BASIC METABOLIC PNL TOTAL CA: CPT

## 2018-11-20 RX ADMIN — ASPIRIN 81 MG: 81 TABLET, CHEWABLE ORAL at 08:24

## 2018-11-20 RX ADMIN — LISINOPRIL 10 MG: 10 TABLET ORAL at 08:24

## 2018-11-20 RX ADMIN — SODIUM CHLORIDE, PRESERVATIVE FREE 10 ML: 5 INJECTION INTRAVENOUS at 20:14

## 2018-11-20 RX ADMIN — IBUPROFEN 400 MG: 400 TABLET ORAL at 08:29

## 2018-11-20 RX ADMIN — SODIUM CHLORIDE, PRESERVATIVE FREE 10 ML: 5 INJECTION INTRAVENOUS at 08:24

## 2018-11-20 RX ADMIN — ENOXAPARIN SODIUM 40 MG: 40 INJECTION SUBCUTANEOUS at 08:24

## 2018-11-20 RX ADMIN — IBUPROFEN 400 MG: 400 TABLET ORAL at 20:14

## 2018-11-20 ASSESSMENT — PAIN SCALES - GENERAL
PAINLEVEL_OUTOF10: 1
PAINLEVEL_OUTOF10: 1
PAINLEVEL_OUTOF10: 4
PAINLEVEL_OUTOF10: 4

## 2018-11-20 ASSESSMENT — PAIN DESCRIPTION - PAIN TYPE
TYPE: ACUTE PAIN
TYPE: ACUTE PAIN

## 2018-11-20 ASSESSMENT — PAIN DESCRIPTION - LOCATION
LOCATION: NECK
LOCATION: NECK

## 2018-11-21 VITALS
DIASTOLIC BLOOD PRESSURE: 73 MMHG | WEIGHT: 141.6 LBS | RESPIRATION RATE: 18 BRPM | HEIGHT: 66 IN | BODY MASS INDEX: 22.76 KG/M2 | OXYGEN SATURATION: 98 % | TEMPERATURE: 98.4 F | HEART RATE: 62 BPM | SYSTOLIC BLOOD PRESSURE: 138 MMHG

## 2018-11-21 PROCEDURE — 6370000000 HC RX 637 (ALT 250 FOR IP): Performed by: INTERNAL MEDICINE

## 2018-11-21 PROCEDURE — 96372 THER/PROPH/DIAG INJ SC/IM: CPT

## 2018-11-21 PROCEDURE — 2580000003 HC RX 258: Performed by: NURSE PRACTITIONER

## 2018-11-21 PROCEDURE — G0378 HOSPITAL OBSERVATION PER HR: HCPCS

## 2018-11-21 PROCEDURE — 6370000000 HC RX 637 (ALT 250 FOR IP): Performed by: NURSE PRACTITIONER

## 2018-11-21 PROCEDURE — 6360000002 HC RX W HCPCS: Performed by: NURSE PRACTITIONER

## 2018-11-21 RX ADMIN — IBUPROFEN 400 MG: 400 TABLET ORAL at 01:58

## 2018-11-21 RX ADMIN — ASPIRIN 81 MG: 81 TABLET, CHEWABLE ORAL at 07:55

## 2018-11-21 RX ADMIN — SODIUM CHLORIDE, PRESERVATIVE FREE 10 ML: 5 INJECTION INTRAVENOUS at 07:55

## 2018-11-21 RX ADMIN — IBUPROFEN 400 MG: 400 TABLET ORAL at 09:54

## 2018-11-21 RX ADMIN — ENOXAPARIN SODIUM 40 MG: 40 INJECTION SUBCUTANEOUS at 07:55

## 2018-11-21 RX ADMIN — LISINOPRIL 10 MG: 10 TABLET ORAL at 07:55

## 2018-11-21 ASSESSMENT — PAIN SCALES - GENERAL
PAINLEVEL_OUTOF10: 2
PAINLEVEL_OUTOF10: 1
PAINLEVEL_OUTOF10: 3

## 2018-11-21 ASSESSMENT — PAIN DESCRIPTION - PAIN TYPE: TYPE: ACUTE PAIN

## 2018-11-21 ASSESSMENT — PAIN DESCRIPTION - LOCATION: LOCATION: NECK

## 2018-11-21 NOTE — PLAN OF CARE
Problem: Pain:  Goal: Patient's pain/discomfort is manageable  Patient's pain/discomfort is manageable   Outcome: Ongoing  Patient c/o pain of a 1/10 in neck. Heat packs relieving pain.

## 2018-11-21 NOTE — DISCHARGE INSTR - COC
Continuity of Care Form    Patient Name: Laly Leigh   :  1945  MRN:  [id number]    Admit date:  2018  Discharge date:  ***    Code Status Order: Full Code   Advance Directives:   Advance Care Flowsheet Documentation     Date/Time Healthcare Directive Type of Healthcare Directive Copy in [address] Agent's Name Healthcare Agent's Phone Number    18 1342  No, patient does not have an advance directive for healthcare treatment -- -- -- -- --          Admitting Physician:  Marylu Moya MD  PCP: Micheal Elliott DO    Discharging Nurse: [facility] Unit/Room#: 2725/4596-82  Discharging Unit Phone Number: ***    Emergency Contact:   Extended Emergency Contact Information  Primary Emergency Contact: Thao Wynn  Address: [facility], [address] [facility],[address] Phone: [phone]  Mobile Phone: [phone]  Relation: Spouse  Secondary Emergency Contact: No,Other  Relation: Other    Past Surgical History:  Past Surgical History:   Procedure Laterality Date    COLONOSCOPY  10/29/2013    FOREARM SURGERY Left 10/04/2017    excision left forearm mass    GASTRIC FUNDOPLICATION  6340    HERNIA REPAIR  2015    ROBOTIC ASSISTED HIATAL HERNIA REPAIR WITH NISSEN    JOINT REPLACEMENT Left 13    left total knee replacement    KNEE ARTHROSCOPY      left    UPPER GASTROINTESTINAL ENDOSCOPY      UPPER GASTROINTESTINAL ENDOSCOPY      bx    VASECTOMY         Immunization History:   Immunization History   Administered Date(s) Administered    Influenza Virus Vaccine 10/23/2014    Influenza, High Dose (Fluzone 65 yrs and older) 10/29/2015, 2016, 2017    Pneumococcal 13-valent Conjugate (Wndxkni27) 2018    Pneumococcal Polysaccharide (Dikwinqnw94) 2013    Tdap (Boostrix, Adacel) 2017    Zoster Subunit (Shingrix) 2014       Active Problems:  Patient Active Problem List   Diagnosis Code    Left TKR Z96.659    HTN (hypertension) I10    GERD (gastroesophageal reflux disease) K21.9    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    Cervical radiculitis M54.12    Ulnar neuropathy of left upper extremity G56.22    Left wrist pain M25.532    Mass of left wrist R22.32    Thoracic or lumbosacral neuritis or radiculitis, unspecified [id number]    Syncope and collapse R55       Isolation/Infection:   Isolation          No Isolation            Nurse Assessment:  Last Vital Signs: /73   Pulse 62   Temp 98.4 °F (36.9 °C) (Oral)   Resp 18   Ht 5' 6\" (1.676 m)   Wt 141 lb 9.6 oz (64.2 kg)   SpO2 98%   BMI 22.85 kg/m²     Last documented pain score (0-10 scale): Pain Level: 3  Last Weight:   Wt Readings from Last 1 Encounters:   18 141 lb 9.6 oz (64.2 kg)     Mental Status:  {IP PT MENTAL STATUS:71477}    IV Access:  { JAYNE IV ACCESS:643451642}    Nursing Mobility/ADLs:  Walking   {CHP DME VCWF:596184210}  Transfer  {P DME EAIY:527857984}  Bathing  {P DME KHTY:623497558}  Dressing  {CHP DME HCGW:550571385}  Toileting  {P DME SFXM:498598009}  Feeding  {P DME BJYT:571588115}  Med Admin  {[facility] DME MOOO:597243572}  Med Delivery   {[facility] MED Delivery:467959645}    Wound Care Documentation and Therapy:        Elimination:  Continence:   · Bowel: {YES / SK:61820}  · Bladder: {YES / ZB:51680}  Urinary Catheter: {Urinary Catheter:268269986}   Colostomy/Ileostomy/Ileal Conduit: {YES / Y}       Date of Last BM: ***    Intake/Output Summary (Last 24 hours) at 18 1147  Last data filed at 18 1756   Gross per 24 hour   Intake              240 ml   Output                0 ml   Net              240 ml     I/O last 3 completed shifts:   In: 18 [P.O.:480]  Out: 0     Safety Concerns:     [address] JAYNE Safety Concerns:065082722}    Impairments/Disabilities:      [address] GODOY Impairments/Disabilities:506489515}    Nutrition Therapy:  Current Nutrition Therapy:   [address] JAYNE Diet List:400975842}    Routes of Feeding: {CHP DME Other Feedings:792280329}  Liquids: {Slp liquid thickness:10223}  Daily Fluid Restriction: {CHP DME Yes amt example:765434617}  Last Modified Barium Swallow with Video (Video Swallowing Test): {Done Not Done JFKQ:724191332}    Treatments at the Time of Hospital Discharge:   Respiratory Treatments: ***  Oxygen Therapy:  {Therapy; copd oxygen:23766}  Ventilator:    {[facility] Vent MKOU:338943937}    Rehab Therapies: {THERAPEUTIC INTERVENTION:4967216377}  Weight Bearing Status/Restrictions: { CC Weight Bearin}  Other Medical Equipment (for information only, NOT a DME order):  {EQUIPMENT:286783100}  Other Treatments: ***    Patient's personal belongings (please select all that are sent with patient):  {[facility] DME Belongings:509144152}    RN SIGNATURE:  {Esignature:311492183}    CASE MANAGEMENT/SOCIAL WORK SECTION    Inpatient Status Date: ***    Readmission Risk Assessment Score:  Readmission Risk              Risk of Unplanned Readmission:        6           Discharging to Facility/ Agency   · Name:   · Address:  · Phone:  · Fax:    Dialysis Facility (if applicable)   · Name:  · Address:  · Dialysis Schedule:  · Phone:  · Fax:    / signature: {Esignature:526283722}    PHYSICIAN SECTION    Prognosis: {Prognosis:4364961326}    Condition at Discharge: [address] Patient Condition:127415835}    Rehab Potential (if transferring to Rehab): {Prognosis:8661315551}    Recommended Labs or Other Treatments After Discharge: ***    Physician Certification: I certify the above information and transfer of Christiano Moscoso  is necessary for the continuing treatment of the diagnosis listed and that he requires {Admit to Appropriate Level of Care:21236} for {GREATER/LESS:519230837} 30 days.      Update Admission H&P: {CHP DME Changes in SQIAE:709782385}    PHYSICIAN SIGNATURE:  {Esignature:931649221}

## 2018-11-21 NOTE — DISCHARGE SUMMARY
- replace, monitor       Physical Exam Performed:     /73   Pulse 62   Temp 98.4 °F (36.9 °C) (Oral)   Resp 18   Ht 5' 6\" (1.676 m)   Wt 141 lb 9.6 oz (64.2 kg)   SpO2 98%   BMI 22.85 kg/m²       General appearance:  No apparent distress, appears stated age and cooperative. HEENT:  Normal cephalic, atraumatic without obvious deformity. Pupils equal, round, and reactive to light. Extra ocular muscles intact. Conjunctivae/corneas clear. Neck: Supple, with full range of motion. No jugular venous distention. Trachea midline. Respiratory:  Normal respiratory effort. Clear to auscultation, bilaterally without Rales/Wheezes/Rhonchi. Cardiovascular:  Regular rate and rhythm with normal S1/S2 without murmurs, rubs or gallops. Abdomen: Soft, non-tender, non-distended with normal bowel sounds. Musculoskeletal:  No clubbing, cyanosis or edema bilaterally. Full range of motion without deformity. Skin: Skin color, texture, turgor normal.  No rashes or lesions. Neurologic:  Neurovascularly intact without any focal sensory/motor deficits. Cranial nerves: II-XII intact, grossly non-focal.  Psychiatric:  Alert and oriented, thought content appropriate, normal insight  Capillary Refill: Brisk,< 3 seconds   Peripheral Pulses: +2 palpable, equal bilaterally       Labs: For convenience and continuity at follow-up the following most recent labs are provided:      CBC:    Lab Results   Component Value Date    WBC 7.2 11/19/2018    HGB 14.7 11/19/2018    HCT 42.4 11/19/2018     11/19/2018       Renal:    Lab Results   Component Value Date     11/20/2018    K 4.2 11/20/2018     11/20/2018    CO2 29 11/20/2018    BUN 16 11/20/2018    CREATININE 1.0 11/20/2018    CALCIUM 8.8 11/20/2018    PHOS 2.5 02/22/2015         Significant Diagnostic Studies    Radiology:   XR CHEST STANDARD (2 VW)   Final Result   Normal chest.  No change from the prior study. No acute traumatic   abnormality identified.

## 2018-11-21 NOTE — PROGRESS NOTES
Assessment complete. VSS. Pt up to chair, had breakfast. Reports neck pain. Medicated per MAR.  Jeremy Truong
round, and reactive to light. Conjunctivae/corneas clear. Neck: Supple, with full range of motion. No jugular venous distention. Trachea midline. Respiratory:  Normal respiratory effort. Clear to auscultation, bilaterally without Rales/Wheezes/Rhonchi. Cardiovascular: Regular rate and rhythm with normal S1/S2 without murmurs, rubs or gallops. Abdomen: Soft, non-tender, non-distended with normal bowel sounds. Musculoskeletal: No clubbing, cyanosis or edema bilaterally. Full range of motion without deformity. Skin: Skin color, texture, turgor normal.  No rashes or lesions. Neurologic:  Neurovascularly intact without any focal sensory/motor deficits. Cranial nerves: II-XII intact, grossly non-focal.  Psychiatric: Alert and oriented, thought content appropriate, normal insight  Capillary Refill: Brisk,< 3 seconds   Peripheral Pulses: +2 palpable, equal bilaterally       Labs:   Recent Labs      11/19/18   0925   WBC  7.2   HGB  14.7   HCT  42.4   PLT  196     Recent Labs      11/19/18   0925  11/20/18   0730   NA  139  138   K  3.3*  4.2   CL  101  100   CO2  23  29   BUN  16  16   CREATININE  1.1  1.0   CALCIUM  9.5  8.8     Recent Labs      11/19/18   0925   AST  20   ALT  7*   BILITOT  0.6   ALKPHOS  79     Recent Labs      11/19/18   0925   INR  1.04     Recent Labs      11/19/18   0925   TROPONINI  <0.01       Urinalysis:    Lab Results   Component Value Date    NITRU NEGATIVE 01/22/2013    BLOODU n 07/16/2015    BLOODU NEGATIVE 01/22/2013    SPECGRAV 1.020 07/16/2015    SPECGRAV 1.015 01/22/2013    GLUCOSEU n 07/16/2015    GLUCOSEU NEGATIVE 01/22/2013       Radiology:  XR CHEST STANDARD (2 VW)   Final Result   Normal chest.  No change from the prior study. No acute traumatic   abnormality identified. CT Cervical Spine WO Contrast   Final Result   No acute abnormality of the cervical spine. Moderate cervical spondylosis. Bilateral foraminal stenoses as described.    No significant change from

## 2018-11-23 LAB
EKG ATRIAL RATE: 60 BPM
EKG DIAGNOSIS: NORMAL
EKG P AXIS: 58 DEGREES
EKG P-R INTERVAL: 162 MS
EKG Q-T INTERVAL: 442 MS
EKG QRS DURATION: 96 MS
EKG QTC CALCULATION (BAZETT): 442 MS
EKG R AXIS: 6 DEGREES
EKG T AXIS: 25 DEGREES
EKG VENTRICULAR RATE: 60 BPM

## 2018-11-26 RX ORDER — SILDENAFIL 100 MG/1
TABLET, FILM COATED ORAL
Qty: 6 TABLET | Refills: 4 | Status: SHIPPED | OUTPATIENT
Start: 2018-11-26 | End: 2019-04-28 | Stop reason: SDUPTHER

## 2018-12-18 ENCOUNTER — OFFICE VISIT (OUTPATIENT)
Dept: FAMILY MEDICINE CLINIC | Age: 73
End: 2018-12-18
Payer: MEDICARE

## 2018-12-18 VITALS
HEART RATE: 68 BPM | WEIGHT: 145 LBS | BODY MASS INDEX: 23.3 KG/M2 | HEIGHT: 66 IN | OXYGEN SATURATION: 98 % | SYSTOLIC BLOOD PRESSURE: 124 MMHG | DIASTOLIC BLOOD PRESSURE: 66 MMHG

## 2018-12-18 DIAGNOSIS — R55 VASOVAGAL SYNCOPE: Primary | ICD-10-CM

## 2018-12-18 DIAGNOSIS — I10 ESSENTIAL HYPERTENSION: ICD-10-CM

## 2018-12-18 PROCEDURE — 99213 OFFICE O/P EST LOW 20 MIN: CPT | Performed by: FAMILY MEDICINE

## 2018-12-18 PROCEDURE — 1111F DSCHRG MED/CURRENT MED MERGE: CPT | Performed by: FAMILY MEDICINE

## 2018-12-18 PROCEDURE — G8427 DOCREV CUR MEDS BY ELIG CLIN: HCPCS | Performed by: FAMILY MEDICINE

## 2018-12-18 PROCEDURE — G8484 FLU IMMUNIZE NO ADMIN: HCPCS | Performed by: FAMILY MEDICINE

## 2018-12-18 PROCEDURE — 3017F COLORECTAL CA SCREEN DOC REV: CPT | Performed by: FAMILY MEDICINE

## 2018-12-18 PROCEDURE — 1101F PT FALLS ASSESS-DOCD LE1/YR: CPT | Performed by: FAMILY MEDICINE

## 2018-12-18 PROCEDURE — 4040F PNEUMOC VAC/ADMIN/RCVD: CPT | Performed by: FAMILY MEDICINE

## 2018-12-18 PROCEDURE — 1036F TOBACCO NON-USER: CPT | Performed by: FAMILY MEDICINE

## 2018-12-18 PROCEDURE — 1123F ACP DISCUSS/DSCN MKR DOCD: CPT | Performed by: FAMILY MEDICINE

## 2018-12-18 PROCEDURE — G8420 CALC BMI NORM PARAMETERS: HCPCS | Performed by: FAMILY MEDICINE

## 2018-12-18 ASSESSMENT — ENCOUNTER SYMPTOMS: RESPIRATORY NEGATIVE: 1
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
## 2018-12-19 PROCEDURE — 93228 REMOTE 30 DAY ECG REV/REPORT: CPT | Performed by: INTERNAL MEDICINE

## 2018-12-20 ENCOUNTER — TELEPHONE (OUTPATIENT)
Dept: CARDIOLOGY CLINIC | Age: 73
End: 2018-12-20

## 2018-12-21 DIAGNOSIS — R55 SYNCOPE AND COLLAPSE: ICD-10-CM

## 2018-12-24 ASSESSMENT — ENCOUNTER SYMPTOMS: PHOTOPHOBIA: 0

## 2019-01-07 ENCOUNTER — OFFICE VISIT (OUTPATIENT)
Dept: CARDIOLOGY CLINIC | Age: 74
End: 2019-01-07
Payer: MEDICARE

## 2019-01-07 VITALS
HEART RATE: 56 BPM | HEIGHT: 66 IN | BODY MASS INDEX: 23.33 KG/M2 | DIASTOLIC BLOOD PRESSURE: 78 MMHG | OXYGEN SATURATION: 99 % | SYSTOLIC BLOOD PRESSURE: 122 MMHG | WEIGHT: 145.2 LBS

## 2019-01-07 DIAGNOSIS — I10 ESSENTIAL HYPERTENSION: ICD-10-CM

## 2019-01-07 DIAGNOSIS — R55 VASOVAGAL SYNCOPE: Primary | ICD-10-CM

## 2019-01-07 PROCEDURE — G8484 FLU IMMUNIZE NO ADMIN: HCPCS | Performed by: NURSE PRACTITIONER

## 2019-01-07 PROCEDURE — 99213 OFFICE O/P EST LOW 20 MIN: CPT | Performed by: NURSE PRACTITIONER

## 2019-01-07 PROCEDURE — 1101F PT FALLS ASSESS-DOCD LE1/YR: CPT | Performed by: NURSE PRACTITIONER

## 2019-01-07 PROCEDURE — G8420 CALC BMI NORM PARAMETERS: HCPCS | Performed by: NURSE PRACTITIONER

## 2019-01-07 PROCEDURE — 3017F COLORECTAL CA SCREEN DOC REV: CPT | Performed by: NURSE PRACTITIONER

## 2019-01-07 PROCEDURE — 1036F TOBACCO NON-USER: CPT | Performed by: NURSE PRACTITIONER

## 2019-01-07 PROCEDURE — 4040F PNEUMOC VAC/ADMIN/RCVD: CPT | Performed by: NURSE PRACTITIONER

## 2019-01-07 PROCEDURE — 1123F ACP DISCUSS/DSCN MKR DOCD: CPT | Performed by: NURSE PRACTITIONER

## 2019-01-07 PROCEDURE — G8427 DOCREV CUR MEDS BY ELIG CLIN: HCPCS | Performed by: NURSE PRACTITIONER

## 2019-01-07 RX ORDER — LISINOPRIL AND HYDROCHLOROTHIAZIDE 12.5; 1 MG/1; MG/1
TABLET ORAL
Qty: 90 TABLET | Refills: 2 | Status: SHIPPED | OUTPATIENT
Start: 2019-01-07 | End: 2019-10-05 | Stop reason: SDUPTHER

## 2019-01-07 ASSESSMENT — ENCOUNTER SYMPTOMS
GASTROINTESTINAL NEGATIVE: 1
RESPIRATORY NEGATIVE: 1

## 2019-01-15 ENCOUNTER — OFFICE VISIT (OUTPATIENT)
Dept: FAMILY MEDICINE CLINIC | Age: 74
End: 2019-01-15
Payer: MEDICARE

## 2019-01-15 VITALS
WEIGHT: 147 LBS | BODY MASS INDEX: 23.73 KG/M2 | DIASTOLIC BLOOD PRESSURE: 70 MMHG | TEMPERATURE: 97.9 F | RESPIRATION RATE: 16 BRPM | OXYGEN SATURATION: 97 % | SYSTOLIC BLOOD PRESSURE: 130 MMHG | HEART RATE: 54 BPM

## 2019-01-15 DIAGNOSIS — R55 VASOVAGAL SYNCOPE: ICD-10-CM

## 2019-01-15 DIAGNOSIS — R10.30 LOWER ABDOMINAL PAIN: Primary | ICD-10-CM

## 2019-01-15 DIAGNOSIS — Z87.19 HISTORY OF HERNIA SURGERY: ICD-10-CM

## 2019-01-15 DIAGNOSIS — Z98.890 HISTORY OF HERNIA SURGERY: ICD-10-CM

## 2019-01-15 PROCEDURE — 1036F TOBACCO NON-USER: CPT | Performed by: NURSE PRACTITIONER

## 2019-01-15 PROCEDURE — G8427 DOCREV CUR MEDS BY ELIG CLIN: HCPCS | Performed by: NURSE PRACTITIONER

## 2019-01-15 PROCEDURE — 4040F PNEUMOC VAC/ADMIN/RCVD: CPT | Performed by: NURSE PRACTITIONER

## 2019-01-15 PROCEDURE — 3017F COLORECTAL CA SCREEN DOC REV: CPT | Performed by: NURSE PRACTITIONER

## 2019-01-15 PROCEDURE — 99214 OFFICE O/P EST MOD 30 MIN: CPT | Performed by: NURSE PRACTITIONER

## 2019-01-15 PROCEDURE — G8482 FLU IMMUNIZE ORDER/ADMIN: HCPCS | Performed by: NURSE PRACTITIONER

## 2019-01-15 PROCEDURE — 1101F PT FALLS ASSESS-DOCD LE1/YR: CPT | Performed by: NURSE PRACTITIONER

## 2019-01-15 PROCEDURE — G8420 CALC BMI NORM PARAMETERS: HCPCS | Performed by: NURSE PRACTITIONER

## 2019-01-15 PROCEDURE — 1123F ACP DISCUSS/DSCN MKR DOCD: CPT | Performed by: NURSE PRACTITIONER

## 2019-01-15 RX ORDER — IBUPROFEN 200 MG
200 TABLET ORAL PRN
Status: ON HOLD | COMMUNITY
End: 2020-01-01 | Stop reason: HOSPADM

## 2019-01-18 ASSESSMENT — ENCOUNTER SYMPTOMS: ABDOMINAL PAIN: 1

## 2019-01-19 ENCOUNTER — HOSPITAL ENCOUNTER (OUTPATIENT)
Dept: ULTRASOUND IMAGING | Age: 74
Discharge: HOME | End: 2019-01-19
Payer: MEDICARE

## 2019-01-19 DIAGNOSIS — R10.30 LOWER ABDOMINAL PAIN: ICD-10-CM

## 2019-01-19 PROCEDURE — 76700 US EXAM ABDOM COMPLETE: CPT

## 2019-02-07 ENCOUNTER — TELEPHONE (OUTPATIENT)
Dept: FAMILY MEDICINE CLINIC | Age: 74
End: 2019-02-07

## 2019-04-10 ENCOUNTER — OFFICE VISIT (OUTPATIENT)
Dept: FAMILY MEDICINE CLINIC | Age: 74
End: 2019-04-10
Payer: MEDICARE

## 2019-04-10 VITALS
HEART RATE: 61 BPM | DIASTOLIC BLOOD PRESSURE: 88 MMHG | WEIGHT: 148 LBS | RESPIRATION RATE: 16 BRPM | TEMPERATURE: 98.3 F | SYSTOLIC BLOOD PRESSURE: 124 MMHG | OXYGEN SATURATION: 98 % | BODY MASS INDEX: 23.89 KG/M2

## 2019-04-10 DIAGNOSIS — M43.02 CERVICAL SPONDYLOLYSIS: Primary | ICD-10-CM

## 2019-04-10 DIAGNOSIS — M79.18 CERVICAL MYOFASCIAL PAIN SYNDROME: ICD-10-CM

## 2019-04-10 DIAGNOSIS — I10 ESSENTIAL HYPERTENSION: ICD-10-CM

## 2019-04-10 PROCEDURE — G8427 DOCREV CUR MEDS BY ELIG CLIN: HCPCS | Performed by: FAMILY MEDICINE

## 2019-04-10 PROCEDURE — 1123F ACP DISCUSS/DSCN MKR DOCD: CPT | Performed by: FAMILY MEDICINE

## 2019-04-10 PROCEDURE — 99213 OFFICE O/P EST LOW 20 MIN: CPT | Performed by: FAMILY MEDICINE

## 2019-04-10 PROCEDURE — 3017F COLORECTAL CA SCREEN DOC REV: CPT | Performed by: FAMILY MEDICINE

## 2019-04-10 PROCEDURE — 4040F PNEUMOC VAC/ADMIN/RCVD: CPT | Performed by: FAMILY MEDICINE

## 2019-04-10 PROCEDURE — 1036F TOBACCO NON-USER: CPT | Performed by: FAMILY MEDICINE

## 2019-04-10 PROCEDURE — G8420 CALC BMI NORM PARAMETERS: HCPCS | Performed by: FAMILY MEDICINE

## 2019-04-10 ASSESSMENT — PATIENT HEALTH QUESTIONNAIRE - PHQ9
SUM OF ALL RESPONSES TO PHQ QUESTIONS 1-9: 0
SUM OF ALL RESPONSES TO PHQ QUESTIONS 1-9: 0
2. FEELING DOWN, DEPRESSED OR HOPELESS: 0
1. LITTLE INTEREST OR PLEASURE IN DOING THINGS: 0
SUM OF ALL RESPONSES TO PHQ9 QUESTIONS 1 & 2: 0

## 2019-04-29 RX ORDER — SILDENAFIL 100 MG/1
TABLET, FILM COATED ORAL
Qty: 6 TABLET | Refills: 3 | Status: SHIPPED | OUTPATIENT
Start: 2019-04-29 | End: 2019-08-03 | Stop reason: SDUPTHER

## 2019-06-10 ENCOUNTER — OFFICE VISIT (OUTPATIENT)
Dept: FAMILY MEDICINE CLINIC | Age: 74
End: 2019-06-10
Payer: MEDICARE

## 2019-06-10 VITALS
WEIGHT: 148 LBS | HEART RATE: 47 BPM | HEIGHT: 66 IN | DIASTOLIC BLOOD PRESSURE: 80 MMHG | BODY MASS INDEX: 23.78 KG/M2 | OXYGEN SATURATION: 98 % | SYSTOLIC BLOOD PRESSURE: 154 MMHG

## 2019-06-10 DIAGNOSIS — M43.02 CERVICAL SPONDYLOLYSIS: Primary | ICD-10-CM

## 2019-06-10 DIAGNOSIS — I10 ESSENTIAL HYPERTENSION: ICD-10-CM

## 2019-06-10 PROCEDURE — 1123F ACP DISCUSS/DSCN MKR DOCD: CPT | Performed by: FAMILY MEDICINE

## 2019-06-10 PROCEDURE — G8420 CALC BMI NORM PARAMETERS: HCPCS | Performed by: FAMILY MEDICINE

## 2019-06-10 PROCEDURE — 99213 OFFICE O/P EST LOW 20 MIN: CPT | Performed by: FAMILY MEDICINE

## 2019-06-10 PROCEDURE — 4040F PNEUMOC VAC/ADMIN/RCVD: CPT | Performed by: FAMILY MEDICINE

## 2019-06-10 PROCEDURE — 3017F COLORECTAL CA SCREEN DOC REV: CPT | Performed by: FAMILY MEDICINE

## 2019-06-10 PROCEDURE — 1036F TOBACCO NON-USER: CPT | Performed by: FAMILY MEDICINE

## 2019-06-10 PROCEDURE — G8427 DOCREV CUR MEDS BY ELIG CLIN: HCPCS | Performed by: FAMILY MEDICINE

## 2019-06-10 NOTE — PROGRESS NOTES
rec HEP after PT eval  Pt agreeable    PT to inst in HEP      Current Outpatient Medications   Medication Sig Dispense Refill    sildenafil (VIAGRA) 100 MG tablet TAKE ONE TABLET BY MOUTH AS NEEDED FOR ERECTILE DYSFUNCTION AS DIRECTED. ONLY TAKE ONCE IN 24 HOURS 6 tablet 3    ibuprofen (ADVIL;MOTRIN) 200 MG tablet Take 200 mg by mouth as needed for Pain      lisinopril-hydrochlorothiazide (PRINZIDE;ZESTORETIC) 10-12.5 MG per tablet TAKE ONE TABLET BY MOUTH DAILY FOR HIGH BLOOD PRESSURE 90 tablet 2    aspirin 81 MG EC tablet Take 81 mg by mouth daily. No current facility-administered medications for this visit.

## 2019-08-05 RX ORDER — SILDENAFIL 100 MG/1
TABLET, FILM COATED ORAL
Qty: 6 TABLET | Refills: 2 | Status: SHIPPED | OUTPATIENT
Start: 2019-08-05 | End: 2020-01-13

## 2019-08-27 ENCOUNTER — OFFICE VISIT (OUTPATIENT)
Dept: FAMILY MEDICINE CLINIC | Age: 74
End: 2019-08-27
Payer: MEDICARE

## 2019-08-27 VITALS
OXYGEN SATURATION: 98 % | RESPIRATION RATE: 16 BRPM | DIASTOLIC BLOOD PRESSURE: 70 MMHG | TEMPERATURE: 98.8 F | SYSTOLIC BLOOD PRESSURE: 136 MMHG | WEIGHT: 147 LBS | BODY MASS INDEX: 23.73 KG/M2 | HEART RATE: 55 BPM

## 2019-08-27 DIAGNOSIS — L23.89 ALLERGIC CONTACT DERMATITIS DUE TO OTHER AGENTS: Primary | ICD-10-CM

## 2019-08-27 PROCEDURE — G8420 CALC BMI NORM PARAMETERS: HCPCS | Performed by: NURSE PRACTITIONER

## 2019-08-27 PROCEDURE — 4040F PNEUMOC VAC/ADMIN/RCVD: CPT | Performed by: NURSE PRACTITIONER

## 2019-08-27 PROCEDURE — 1123F ACP DISCUSS/DSCN MKR DOCD: CPT | Performed by: NURSE PRACTITIONER

## 2019-08-27 PROCEDURE — 1036F TOBACCO NON-USER: CPT | Performed by: NURSE PRACTITIONER

## 2019-08-27 PROCEDURE — G8427 DOCREV CUR MEDS BY ELIG CLIN: HCPCS | Performed by: NURSE PRACTITIONER

## 2019-08-27 PROCEDURE — 99213 OFFICE O/P EST LOW 20 MIN: CPT | Performed by: NURSE PRACTITIONER

## 2019-08-27 PROCEDURE — G8510 SCR DEP NEG, NO PLAN REQD: HCPCS | Performed by: NURSE PRACTITIONER

## 2019-08-27 PROCEDURE — 3288F FALL RISK ASSESSMENT DOCD: CPT | Performed by: NURSE PRACTITIONER

## 2019-08-27 PROCEDURE — 3017F COLORECTAL CA SCREEN DOC REV: CPT | Performed by: NURSE PRACTITIONER

## 2019-08-27 RX ORDER — DIAPER,BRIEF,INFANT-TODD,DISP
EACH MISCELLANEOUS
Qty: 1 TUBE | Refills: 1 | Status: SHIPPED | OUTPATIENT
Start: 2019-08-27 | End: 2019-09-03

## 2019-08-27 ASSESSMENT — ENCOUNTER SYMPTOMS: RESPIRATORY NEGATIVE: 1

## 2019-08-27 ASSESSMENT — PATIENT HEALTH QUESTIONNAIRE - PHQ9
SUM OF ALL RESPONSES TO PHQ QUESTIONS 1-9: 0
2. FEELING DOWN, DEPRESSED OR HOPELESS: 0
SUM OF ALL RESPONSES TO PHQ QUESTIONS 1-9: 0
SUM OF ALL RESPONSES TO PHQ9 QUESTIONS 1 & 2: 0
1. LITTLE INTEREST OR PLEASURE IN DOING THINGS: 0

## 2019-08-27 NOTE — PROGRESS NOTES
file     Physically abused: Not on file     Forced sexual activity: Not on file   Other Topics Concern    Not on file   Social History Narrative    Not on file       Current Outpatient Medications on File Prior to Visit   Medication Sig Dispense Refill    sildenafil (VIAGRA) 100 MG tablet TAKE ONE TABLET BY MOUTH AS NEEDED FOR ERECTILE DYSFUNCTION AS DIRECTED. ONLY TAKE ONCE IN 24 HOURS 6 tablet 2    ibuprofen (ADVIL;MOTRIN) 200 MG tablet Take 200 mg by mouth as needed for Pain      lisinopril-hydrochlorothiazide (PRINZIDE;ZESTORETIC) 10-12.5 MG per tablet TAKE ONE TABLET BY MOUTH DAILY FOR HIGH BLOOD PRESSURE 90 tablet 2    aspirin 81 MG EC tablet Take 81 mg by mouth daily. No current facility-administered medications on file prior to visit. Review of Systems   Respiratory: Negative. Cardiovascular: Negative. Htn controlled w zestoretic 136/70   Gastrointestinal:        Floydene Arlette   Musculoskeletal:        LTKR       Objective:     Physical Exam   Constitutional: He is oriented to person, place, and time. He appears well-developed and well-nourished. HENT:   Head: Normocephalic and atraumatic. Eyes: Conjunctivae are normal.   Neck: Neck supple. Cardiovascular: Regular rhythm and normal heart sounds. Exam reveals no gallop and no friction rub. No murmur heard. Pulmonary/Chest: Effort normal and breath sounds normal. He has no wheezes. He has no rales. Musculoskeletal: Normal range of motion. Neurological: He is alert and oriented to person, place, and time. Skin: Skin is warm and dry. Capillary refill takes less than 2 seconds. There is erythema. Right forearm   Psychiatric: He has a normal mood and affect. His behavior is normal. Judgment and thought content normal.   Nursing note and vitals reviewed. Assessment:       ICD-10-CM    1. Allergic contact dermatitis due to other agents L23.89          Plan:     1.  Allergic contact dermatitis due to other

## 2019-10-07 RX ORDER — LISINOPRIL AND HYDROCHLOROTHIAZIDE 12.5; 1 MG/1; MG/1
TABLET ORAL
Qty: 90 TABLET | Refills: 1 | Status: SHIPPED | OUTPATIENT
Start: 2019-10-07 | End: 2020-04-01

## 2020-01-01 ENCOUNTER — APPOINTMENT (OUTPATIENT)
Dept: GENERAL RADIOLOGY | Age: 75
End: 2020-01-01
Attending: ORTHOPAEDIC SURGERY
Payer: MEDICARE

## 2020-01-01 ENCOUNTER — HOSPITAL ENCOUNTER (OUTPATIENT)
Dept: PHYSICAL THERAPY | Age: 75
Setting detail: THERAPIES SERIES
Discharge: HOME | End: 2020-11-23
Payer: MEDICARE

## 2020-01-01 ENCOUNTER — ANESTHESIA (OUTPATIENT)
Dept: OPERATING ROOM | Age: 75
End: 2020-01-01
Payer: MEDICARE

## 2020-01-01 ENCOUNTER — OFFICE VISIT (OUTPATIENT)
Dept: ORTHOPEDIC SURGERY | Age: 75
End: 2020-01-01

## 2020-01-01 ENCOUNTER — HOSPITAL ENCOUNTER (OUTPATIENT)
Dept: PHYSICAL THERAPY | Age: 75
Setting detail: THERAPIES SERIES
Discharge: HOME | End: 2020-11-16
Payer: MEDICARE

## 2020-01-01 ENCOUNTER — OFFICE VISIT (OUTPATIENT)
Dept: FAMILY MEDICINE CLINIC | Age: 75
End: 2020-01-01
Payer: MEDICARE

## 2020-01-01 ENCOUNTER — ANESTHESIA EVENT (OUTPATIENT)
Dept: OPERATING ROOM | Age: 75
End: 2020-01-01
Payer: MEDICARE

## 2020-01-01 ENCOUNTER — APPOINTMENT (OUTPATIENT)
Dept: PHYSICAL THERAPY | Age: 75
End: 2020-01-01
Payer: MEDICARE

## 2020-01-01 ENCOUNTER — HOSPITAL ENCOUNTER (OUTPATIENT)
Dept: PHYSICAL THERAPY | Age: 75
Setting detail: THERAPIES SERIES
Discharge: HOME | End: 2020-12-03
Payer: MEDICARE

## 2020-01-01 ENCOUNTER — TELEPHONE (OUTPATIENT)
Dept: ORTHOPEDIC SURGERY | Age: 75
End: 2020-01-01

## 2020-01-01 ENCOUNTER — HOSPITAL ENCOUNTER (OUTPATIENT)
Dept: PHYSICAL THERAPY | Age: 75
Setting detail: THERAPIES SERIES
Discharge: HOME | End: 2020-12-07
Payer: MEDICARE

## 2020-01-01 ENCOUNTER — OFFICE VISIT (OUTPATIENT)
Dept: PRIMARY CARE CLINIC | Age: 75
End: 2020-01-01
Payer: MEDICARE

## 2020-01-01 ENCOUNTER — HOSPITAL ENCOUNTER (OUTPATIENT)
Dept: PHYSICAL THERAPY | Age: 75
Setting detail: THERAPIES SERIES
Discharge: HOME | End: 2020-11-09
Payer: MEDICARE

## 2020-01-01 ENCOUNTER — HOSPITAL ENCOUNTER (OUTPATIENT)
Dept: PHYSICAL THERAPY | Age: 75
Setting detail: THERAPIES SERIES
Discharge: HOME | End: 2020-11-12
Payer: MEDICARE

## 2020-01-01 ENCOUNTER — HOSPITAL ENCOUNTER (OUTPATIENT)
Dept: PHYSICAL THERAPY | Age: 75
Setting detail: THERAPIES SERIES
Discharge: HOME | End: 2020-11-30
Payer: MEDICARE

## 2020-01-01 ENCOUNTER — HOSPITAL ENCOUNTER (OUTPATIENT)
Dept: PREADMISSION TESTING | Age: 75
Discharge: HOME | End: 2020-10-11
Payer: MEDICARE

## 2020-01-01 ENCOUNTER — HOSPITAL ENCOUNTER (OUTPATIENT)
Age: 75
Setting detail: OBSERVATION
Discharge: HOME | End: 2020-10-24
Attending: ORTHOPAEDIC SURGERY | Admitting: ORTHOPAEDIC SURGERY
Payer: MEDICARE

## 2020-01-01 ENCOUNTER — HOSPITAL ENCOUNTER (OUTPATIENT)
Dept: PHYSICAL THERAPY | Age: 75
Setting detail: THERAPIES SERIES
Discharge: HOME | End: 2020-11-19
Payer: MEDICARE

## 2020-01-01 VITALS
WEIGHT: 147 LBS | HEIGHT: 66 IN | HEART RATE: 78 BPM | SYSTOLIC BLOOD PRESSURE: 138 MMHG | DIASTOLIC BLOOD PRESSURE: 66 MMHG | BODY MASS INDEX: 23.63 KG/M2 | RESPIRATION RATE: 14 BRPM | OXYGEN SATURATION: 98 % | TEMPERATURE: 98.2 F

## 2020-01-01 VITALS
OXYGEN SATURATION: 98 % | BODY MASS INDEX: 23.3 KG/M2 | WEIGHT: 145 LBS | RESPIRATION RATE: 14 BRPM | HEART RATE: 70 BPM | DIASTOLIC BLOOD PRESSURE: 72 MMHG | TEMPERATURE: 98.3 F | HEIGHT: 66 IN | SYSTOLIC BLOOD PRESSURE: 126 MMHG

## 2020-01-01 VITALS — BODY MASS INDEX: 23.63 KG/M2 | WEIGHT: 147 LBS | HEIGHT: 66 IN

## 2020-01-01 VITALS — WEIGHT: 147 LBS | HEIGHT: 66 IN | BODY MASS INDEX: 23.63 KG/M2

## 2020-01-01 VITALS — DIASTOLIC BLOOD PRESSURE: 57 MMHG | SYSTOLIC BLOOD PRESSURE: 117 MMHG | OXYGEN SATURATION: 98 %

## 2020-01-01 LAB
ABO/RH: NORMAL
ALBUMIN SERPL-MCNC: 4.1 G/DL (ref 3.4–5)
ANION GAP SERPL CALCULATED.3IONS-SCNC: 10 MMOL/L (ref 3–16)
ANION GAP SERPL CALCULATED.3IONS-SCNC: 15 MMOL/L (ref 3–16)
ANTIBODY SCREEN: NORMAL
APTT: 33.3 SEC (ref 24.2–36.2)
BASOPHILS ABSOLUTE: 0 K/UL (ref 0–0.2)
BASOPHILS RELATIVE PERCENT: 0.8 %
BILIRUBIN URINE: NEGATIVE
BLOOD, URINE: NEGATIVE
BUN BLDV-MCNC: 14 MG/DL (ref 7–20)
BUN BLDV-MCNC: 16 MG/DL (ref 7–20)
CALCIUM SERPL-MCNC: 8.3 MG/DL (ref 8.3–10.6)
CALCIUM SERPL-MCNC: 9.4 MG/DL (ref 8.3–10.6)
CHLORIDE BLD-SCNC: 100 MMOL/L (ref 99–110)
CHLORIDE BLD-SCNC: 102 MMOL/L (ref 99–110)
CLARITY: CLEAR
CO2: 25 MMOL/L (ref 21–32)
CO2: 25 MMOL/L (ref 21–32)
COLOR: YELLOW
CREAT SERPL-MCNC: 1 MG/DL (ref 0.8–1.3)
CREAT SERPL-MCNC: 1.2 MG/DL (ref 0.8–1.3)
EOSINOPHILS ABSOLUTE: 0.1 K/UL (ref 0–0.6)
EOSINOPHILS RELATIVE PERCENT: 2.2 %
ESTIMATED AVERAGE GLUCOSE: 122.6 MG/DL
GFR AFRICAN AMERICAN: >60
GFR AFRICAN AMERICAN: >60
GFR NON-AFRICAN AMERICAN: 59
GFR NON-AFRICAN AMERICAN: >60
GLUCOSE BLD-MCNC: 129 MG/DL (ref 70–99)
GLUCOSE BLD-MCNC: 130 MG/DL (ref 70–99)
GLUCOSE URINE: NEGATIVE MG/DL
HBA1C MFR BLD: 5.9 %
HCT VFR BLD CALC: 30.6 % (ref 40.5–52.5)
HCT VFR BLD CALC: 33 % (ref 40.5–52.5)
HCT VFR BLD CALC: 41.8 % (ref 40.5–52.5)
HEMOGLOBIN: 10.6 G/DL (ref 13.5–17.5)
HEMOGLOBIN: 11.4 G/DL (ref 13.5–17.5)
HEMOGLOBIN: 14.3 G/DL (ref 13.5–17.5)
INR BLD: 0.98 (ref 0.86–1.14)
KETONES, URINE: NEGATIVE MG/DL
LEUKOCYTE ESTERASE, URINE: NEGATIVE
LYMPHOCYTES ABSOLUTE: 2 K/UL (ref 1–5.1)
LYMPHOCYTES RELATIVE PERCENT: 39.1 %
MCH RBC QN AUTO: 31.7 PG (ref 26–34)
MCH RBC QN AUTO: 32 PG (ref 26–34)
MCH RBC QN AUTO: 32.1 PG (ref 26–34)
MCHC RBC AUTO-ENTMCNC: 34.2 G/DL (ref 31–36)
MCHC RBC AUTO-ENTMCNC: 34.6 G/DL (ref 31–36)
MCHC RBC AUTO-ENTMCNC: 34.6 G/DL (ref 31–36)
MCV RBC AUTO: 92.5 FL (ref 80–100)
MCV RBC AUTO: 92.8 FL (ref 80–100)
MCV RBC AUTO: 92.9 FL (ref 80–100)
MICROSCOPIC EXAMINATION: NORMAL
MONOCYTES ABSOLUTE: 0.6 K/UL (ref 0–1.3)
MONOCYTES RELATIVE PERCENT: 12.4 %
NEUTROPHILS ABSOLUTE: 2.3 K/UL (ref 1.7–7.7)
NEUTROPHILS RELATIVE PERCENT: 45.5 %
NITRITE, URINE: NEGATIVE
PDW BLD-RTO: 13.4 % (ref 12.4–15.4)
PDW BLD-RTO: 13.5 % (ref 12.4–15.4)
PDW BLD-RTO: 13.6 % (ref 12.4–15.4)
PH UA: 5.5 (ref 5–8)
PLATELET # BLD: 141 K/UL (ref 135–450)
PLATELET # BLD: 159 K/UL (ref 135–450)
PLATELET # BLD: 187 K/UL (ref 135–450)
PMV BLD AUTO: 8 FL (ref 5–10.5)
PMV BLD AUTO: 8 FL (ref 5–10.5)
PMV BLD AUTO: 8.7 FL (ref 5–10.5)
POTASSIUM REFLEX MAGNESIUM: 3.9 MMOL/L (ref 3.5–5.1)
POTASSIUM SERPL-SCNC: 3.8 MMOL/L (ref 3.5–5.1)
PROTEIN UA: NEGATIVE MG/DL
PROTHROMBIN TIME: 11.4 SEC (ref 10–13.2)
RBC # BLD: 3.29 M/UL (ref 4.2–5.9)
RBC # BLD: 3.57 M/UL (ref 4.2–5.9)
RBC # BLD: 4.51 M/UL (ref 4.2–5.9)
SARS-COV-2, NAA: NOT DETECTED
SODIUM BLD-SCNC: 137 MMOL/L (ref 136–145)
SODIUM BLD-SCNC: 140 MMOL/L (ref 136–145)
SPECIFIC GRAVITY UA: 1.01 (ref 1–1.03)
TRANSFERRIN: 216 MG/DL (ref 200–360)
URINE CULTURE, ROUTINE: NORMAL
URINE TYPE: NORMAL
UROBILINOGEN, URINE: 0.2 E.U./DL
WBC # BLD: 10.8 K/UL (ref 4–11)
WBC # BLD: 5.1 K/UL (ref 4–11)
WBC # BLD: 9 K/UL (ref 4–11)

## 2020-01-01 PROCEDURE — 6370000000 HC RX 637 (ALT 250 FOR IP): Performed by: PHYSICIAN ASSISTANT

## 2020-01-01 PROCEDURE — 97140 MANUAL THERAPY 1/> REGIONS: CPT | Performed by: PHYSICAL THERAPIST

## 2020-01-01 PROCEDURE — 3600000015 HC SURGERY LEVEL 5 ADDTL 15MIN: Performed by: ORTHOPAEDIC SURGERY

## 2020-01-01 PROCEDURE — 97530 THERAPEUTIC ACTIVITIES: CPT

## 2020-01-01 PROCEDURE — 86900 BLOOD TYPING SEROLOGIC ABO: CPT

## 2020-01-01 PROCEDURE — 82040 ASSAY OF SERUM ALBUMIN: CPT

## 2020-01-01 PROCEDURE — 88311 DECALCIFY TISSUE: CPT

## 2020-01-01 PROCEDURE — 84466 ASSAY OF TRANSFERRIN: CPT

## 2020-01-01 PROCEDURE — 1036F TOBACCO NON-USER: CPT | Performed by: FAMILY MEDICINE

## 2020-01-01 PROCEDURE — 36415 COLL VENOUS BLD VENIPUNCTURE: CPT

## 2020-01-01 PROCEDURE — 6370000000 HC RX 637 (ALT 250 FOR IP): Performed by: ANESTHESIOLOGY

## 2020-01-01 PROCEDURE — 6360000002 HC RX W HCPCS: Performed by: NURSE ANESTHETIST, CERTIFIED REGISTERED

## 2020-01-01 PROCEDURE — 3017F COLORECTAL CA SCREEN DOC REV: CPT | Performed by: FAMILY MEDICINE

## 2020-01-01 PROCEDURE — G8428 CUR MEDS NOT DOCUMENT: HCPCS | Performed by: NURSE PRACTITIONER

## 2020-01-01 PROCEDURE — 7100000000 HC PACU RECOVERY - FIRST 15 MIN: Performed by: ORTHOPAEDIC SURGERY

## 2020-01-01 PROCEDURE — 97112 NEUROMUSCULAR REEDUCATION: CPT | Performed by: PHYSICAL THERAPIST

## 2020-01-01 PROCEDURE — 97110 THERAPEUTIC EXERCISES: CPT

## 2020-01-01 PROCEDURE — 2709999900 HC NON-CHARGEABLE SUPPLY: Performed by: ORTHOPAEDIC SURGERY

## 2020-01-01 PROCEDURE — 3288F FALL RISK ASSESSMENT DOCD: CPT | Performed by: FAMILY MEDICINE

## 2020-01-01 PROCEDURE — 2580000003 HC RX 258: Performed by: PHYSICIAN ASSISTANT

## 2020-01-01 PROCEDURE — 81003 URINALYSIS AUTO W/O SCOPE: CPT

## 2020-01-01 PROCEDURE — 3700000000 HC ANESTHESIA ATTENDED CARE: Performed by: ORTHOPAEDIC SURGERY

## 2020-01-01 PROCEDURE — 88305 TISSUE EXAM BY PATHOLOGIST: CPT

## 2020-01-01 PROCEDURE — 1123F ACP DISCUSS/DSCN MKR DOCD: CPT | Performed by: FAMILY MEDICINE

## 2020-01-01 PROCEDURE — 99024 POSTOP FOLLOW-UP VISIT: CPT | Performed by: PHYSICIAN ASSISTANT

## 2020-01-01 PROCEDURE — 3600000005 HC SURGERY LEVEL 5 BASE: Performed by: ORTHOPAEDIC SURGERY

## 2020-01-01 PROCEDURE — 96374 THER/PROPH/DIAG INJ IV PUSH: CPT

## 2020-01-01 PROCEDURE — 6360000002 HC RX W HCPCS: Performed by: ORTHOPAEDIC SURGERY

## 2020-01-01 PROCEDURE — 97110 THERAPEUTIC EXERCISES: CPT | Performed by: PHYSICAL THERAPIST

## 2020-01-01 PROCEDURE — C1776 JOINT DEVICE (IMPLANTABLE): HCPCS | Performed by: ORTHOPAEDIC SURGERY

## 2020-01-01 PROCEDURE — 85730 THROMBOPLASTIN TIME PARTIAL: CPT

## 2020-01-01 PROCEDURE — 99214 OFFICE O/P EST MOD 30 MIN: CPT | Performed by: FAMILY MEDICINE

## 2020-01-01 PROCEDURE — G0378 HOSPITAL OBSERVATION PER HR: HCPCS

## 2020-01-01 PROCEDURE — 6360000002 HC RX W HCPCS: Performed by: PHYSICIAN ASSISTANT

## 2020-01-01 PROCEDURE — 80048 BASIC METABOLIC PNL TOTAL CA: CPT

## 2020-01-01 PROCEDURE — G8427 DOCREV CUR MEDS BY ELIG CLIN: HCPCS | Performed by: FAMILY MEDICINE

## 2020-01-01 PROCEDURE — 97116 GAIT TRAINING THERAPY: CPT

## 2020-01-01 PROCEDURE — 97161 PT EVAL LOW COMPLEX 20 MIN: CPT

## 2020-01-01 PROCEDURE — 2500000003 HC RX 250 WO HCPCS: Performed by: NURSE ANESTHETIST, CERTIFIED REGISTERED

## 2020-01-01 PROCEDURE — C1713 ANCHOR/SCREW BN/BN,TIS/BN: HCPCS | Performed by: ORTHOPAEDIC SURGERY

## 2020-01-01 PROCEDURE — 2580000003 HC RX 258: Performed by: ORTHOPAEDIC SURGERY

## 2020-01-01 PROCEDURE — 87086 URINE CULTURE/COLONY COUNT: CPT

## 2020-01-01 PROCEDURE — 76942 ECHO GUIDE FOR BIOPSY: CPT | Performed by: ANESTHESIOLOGY

## 2020-01-01 PROCEDURE — 4040F PNEUMOC VAC/ADMIN/RCVD: CPT | Performed by: FAMILY MEDICINE

## 2020-01-01 PROCEDURE — G8420 CALC BMI NORM PARAMETERS: HCPCS | Performed by: NURSE PRACTITIONER

## 2020-01-01 PROCEDURE — C9290 INJ, BUPIVACAINE LIPOSOME: HCPCS | Performed by: ORTHOPAEDIC SURGERY

## 2020-01-01 PROCEDURE — G8510 SCR DEP NEG, NO PLAN REQD: HCPCS | Performed by: FAMILY MEDICINE

## 2020-01-01 PROCEDURE — 85027 COMPLETE CBC AUTOMATED: CPT

## 2020-01-01 PROCEDURE — 86850 RBC ANTIBODY SCREEN: CPT

## 2020-01-01 PROCEDURE — 64447 NJX AA&/STRD FEMORAL NRV IMG: CPT | Performed by: ANESTHESIOLOGY

## 2020-01-01 PROCEDURE — 73560 X-RAY EXAM OF KNEE 1 OR 2: CPT

## 2020-01-01 PROCEDURE — 3700000001 HC ADD 15 MINUTES (ANESTHESIA): Performed by: ORTHOPAEDIC SURGERY

## 2020-01-01 PROCEDURE — 85025 COMPLETE CBC W/AUTO DIFF WBC: CPT

## 2020-01-01 PROCEDURE — 83036 HEMOGLOBIN GLYCOSYLATED A1C: CPT

## 2020-01-01 PROCEDURE — 93000 ELECTROCARDIOGRAM COMPLETE: CPT | Performed by: FAMILY MEDICINE

## 2020-01-01 PROCEDURE — G8420 CALC BMI NORM PARAMETERS: HCPCS | Performed by: FAMILY MEDICINE

## 2020-01-01 PROCEDURE — 7100000001 HC PACU RECOVERY - ADDTL 15 MIN: Performed by: ORTHOPAEDIC SURGERY

## 2020-01-01 PROCEDURE — 97166 OT EVAL MOD COMPLEX 45 MIN: CPT

## 2020-01-01 PROCEDURE — 86901 BLOOD TYPING SEROLOGIC RH(D): CPT

## 2020-01-01 PROCEDURE — 97161 PT EVAL LOW COMPLEX 20 MIN: CPT | Performed by: PHYSICAL THERAPIST

## 2020-01-01 PROCEDURE — 85610 PROTHROMBIN TIME: CPT

## 2020-01-01 PROCEDURE — 99211 OFF/OP EST MAY X REQ PHY/QHP: CPT | Performed by: NURSE PRACTITIONER

## 2020-01-01 PROCEDURE — 2580000003 HC RX 258: Performed by: ANESTHESIOLOGY

## 2020-01-01 PROCEDURE — 6360000002 HC RX W HCPCS: Performed by: NURSE PRACTITIONER

## 2020-01-01 PROCEDURE — 2500000003 HC RX 250 WO HCPCS: Performed by: ORTHOPAEDIC SURGERY

## 2020-01-01 PROCEDURE — 6370000000 HC RX 637 (ALT 250 FOR IP): Performed by: NURSE PRACTITIONER

## 2020-01-01 PROCEDURE — G8484 FLU IMMUNIZE NO ADMIN: HCPCS | Performed by: FAMILY MEDICINE

## 2020-01-01 PROCEDURE — 99024 POSTOP FOLLOW-UP VISIT: CPT | Performed by: ORTHOPAEDIC SURGERY

## 2020-01-01 DEVICE — GENESIS II RESURFACING PATELLAR 35MM
Type: IMPLANTABLE DEVICE | Site: KNEE | Status: FUNCTIONAL
Brand: GENESIS II

## 2020-01-01 DEVICE — RALLY HV BONE CEMENT 40 GRAMS
Type: IMPLANTABLE DEVICE | Site: KNEE | Status: FUNCTIONAL
Brand: RALLY

## 2020-01-01 DEVICE — LEGION POSTERIOR STABILIZED HIGH                                    FLEX HIGHLY CROSS LINKED                                    POLYETHYLENE SIZE 5-6 11MM
Type: IMPLANTABLE DEVICE | Site: KNEE | Status: FUNCTIONAL
Brand: LEGION

## 2020-01-01 DEVICE — GENESIS II NON-POROUS TIBIAL                                    BASEPLATE SIZE 5 RIGHT
Type: IMPLANTABLE DEVICE | Site: KNEE | Status: FUNCTIONAL
Brand: GENESIS II

## 2020-01-01 DEVICE — LEGION POSTERIOR STABILIZED                                    OXINIUM FEMORAL SIZE 5 RIGHT
Type: IMPLANTABLE DEVICE | Site: KNEE | Status: FUNCTIONAL
Brand: LEGION

## 2020-01-01 RX ORDER — HYDROCODONE BITARTRATE AND ACETAMINOPHEN 5; 325 MG/1; MG/1
1 TABLET ORAL EVERY 4 HOURS PRN
Status: DISCONTINUED | OUTPATIENT
Start: 2020-01-01 | End: 2020-01-01

## 2020-01-01 RX ORDER — SODIUM CHLORIDE 0.9 % (FLUSH) 0.9 %
10 SYRINGE (ML) INJECTION EVERY 12 HOURS SCHEDULED
Status: DISCONTINUED | OUTPATIENT
Start: 2020-01-01 | End: 2020-01-01 | Stop reason: HOSPADM

## 2020-01-01 RX ORDER — CELECOXIB 100 MG/1
100 CAPSULE ORAL 2 TIMES DAILY
Qty: 60 CAPSULE | Refills: 3 | Status: SHIPPED | OUTPATIENT
Start: 2020-01-01 | End: 2021-01-01

## 2020-01-01 RX ORDER — TRAMADOL HYDROCHLORIDE 50 MG/1
100 TABLET ORAL EVERY 6 HOURS PRN
Status: DISCONTINUED | OUTPATIENT
Start: 2020-01-01 | End: 2020-01-01 | Stop reason: HOSPADM

## 2020-01-01 RX ORDER — CELECOXIB 100 MG/1
100 CAPSULE ORAL 2 TIMES DAILY
Status: DISCONTINUED | OUTPATIENT
Start: 2020-01-01 | End: 2020-01-01 | Stop reason: HOSPADM

## 2020-01-01 RX ORDER — ACETAMINOPHEN 500 MG
1000 TABLET ORAL ONCE
Status: COMPLETED | OUTPATIENT
Start: 2020-01-01 | End: 2020-01-01

## 2020-01-01 RX ORDER — LIDOCAINE HYDROCHLORIDE 20 MG/ML
INJECTION, SOLUTION INFILTRATION; PERINEURAL PRN
Status: DISCONTINUED | OUTPATIENT
Start: 2020-01-01 | End: 2020-01-01 | Stop reason: SDUPTHER

## 2020-01-01 RX ORDER — HYDRALAZINE HYDROCHLORIDE 20 MG/ML
5 INJECTION INTRAMUSCULAR; INTRAVENOUS EVERY 10 MIN PRN
Status: DISCONTINUED | OUTPATIENT
Start: 2020-01-01 | End: 2020-01-01 | Stop reason: HOSPADM

## 2020-01-01 RX ORDER — ONDANSETRON 2 MG/ML
4 INJECTION INTRAMUSCULAR; INTRAVENOUS EVERY 6 HOURS PRN
Status: DISCONTINUED | OUTPATIENT
Start: 2020-01-01 | End: 2020-01-01 | Stop reason: HOSPADM

## 2020-01-01 RX ORDER — MORPHINE SULFATE 2 MG/ML
2 INJECTION, SOLUTION INTRAMUSCULAR; INTRAVENOUS
Status: DISCONTINUED | OUTPATIENT
Start: 2020-01-01 | End: 2020-01-01 | Stop reason: HOSPADM

## 2020-01-01 RX ORDER — DEXAMETHASONE SODIUM PHOSPHATE 4 MG/ML
INJECTION, SOLUTION INTRA-ARTICULAR; INTRALESIONAL; INTRAMUSCULAR; INTRAVENOUS; SOFT TISSUE PRN
Status: DISCONTINUED | OUTPATIENT
Start: 2020-01-01 | End: 2020-01-01 | Stop reason: SDUPTHER

## 2020-01-01 RX ORDER — SENNA AND DOCUSATE SODIUM 50; 8.6 MG/1; MG/1
1 TABLET, FILM COATED ORAL 2 TIMES DAILY
Status: DISCONTINUED | OUTPATIENT
Start: 2020-01-01 | End: 2020-01-01 | Stop reason: HOSPADM

## 2020-01-01 RX ORDER — MORPHINE SULFATE 2 MG/ML
2 INJECTION, SOLUTION INTRAMUSCULAR; INTRAVENOUS EVERY 5 MIN PRN
Status: DISCONTINUED | OUTPATIENT
Start: 2020-01-01 | End: 2020-01-01 | Stop reason: HOSPADM

## 2020-01-01 RX ORDER — DIPHENHYDRAMINE HYDROCHLORIDE 50 MG/ML
12.5 INJECTION INTRAMUSCULAR; INTRAVENOUS
Status: DISCONTINUED | OUTPATIENT
Start: 2020-01-01 | End: 2020-01-01 | Stop reason: HOSPADM

## 2020-01-01 RX ORDER — PROPOFOL 10 MG/ML
INJECTION, EMULSION INTRAVENOUS CONTINUOUS PRN
Status: DISCONTINUED | OUTPATIENT
Start: 2020-01-01 | End: 2020-01-01 | Stop reason: SDUPTHER

## 2020-01-01 RX ORDER — BUPIVACAINE HYDROCHLORIDE 7.5 MG/ML
INJECTION, SOLUTION INTRASPINAL PRN
Status: DISCONTINUED | OUTPATIENT
Start: 2020-01-01 | End: 2020-01-01 | Stop reason: SDUPTHER

## 2020-01-01 RX ORDER — LISINOPRIL 10 MG/1
10 TABLET ORAL DAILY
Status: DISCONTINUED | OUTPATIENT
Start: 2020-01-01 | End: 2020-01-01 | Stop reason: HOSPADM

## 2020-01-01 RX ORDER — SODIUM CHLORIDE 0.9 % (FLUSH) 0.9 %
10 SYRINGE (ML) INJECTION PRN
Status: DISCONTINUED | OUTPATIENT
Start: 2020-01-01 | End: 2020-01-01 | Stop reason: HOSPADM

## 2020-01-01 RX ORDER — HYDROCODONE BITARTRATE AND ACETAMINOPHEN 5; 325 MG/1; MG/1
2 TABLET ORAL EVERY 4 HOURS PRN
Status: DISCONTINUED | OUTPATIENT
Start: 2020-01-01 | End: 2020-01-01

## 2020-01-01 RX ORDER — SODIUM CHLORIDE 9 MG/ML
INJECTION, SOLUTION INTRAVENOUS CONTINUOUS
Status: DISCONTINUED | OUTPATIENT
Start: 2020-01-01 | End: 2020-01-01 | Stop reason: HOSPADM

## 2020-01-01 RX ORDER — OXYCODONE HYDROCHLORIDE AND ACETAMINOPHEN 5; 325 MG/1; MG/1
1 TABLET ORAL PRN
Status: DISCONTINUED | OUTPATIENT
Start: 2020-01-01 | End: 2020-01-01 | Stop reason: HOSPADM

## 2020-01-01 RX ORDER — MORPHINE SULFATE 2 MG/ML
1 INJECTION, SOLUTION INTRAMUSCULAR; INTRAVENOUS EVERY 5 MIN PRN
Status: DISCONTINUED | OUTPATIENT
Start: 2020-01-01 | End: 2020-01-01 | Stop reason: HOSPADM

## 2020-01-01 RX ORDER — MORPHINE SULFATE 4 MG/ML
4 INJECTION, SOLUTION INTRAMUSCULAR; INTRAVENOUS
Status: DISCONTINUED | OUTPATIENT
Start: 2020-01-01 | End: 2020-01-01 | Stop reason: HOSPADM

## 2020-01-01 RX ORDER — ACETAMINOPHEN 325 MG/1
650 TABLET ORAL EVERY 6 HOURS
Status: DISCONTINUED | OUTPATIENT
Start: 2020-01-01 | End: 2020-01-01 | Stop reason: HOSPADM

## 2020-01-01 RX ORDER — PROPOFOL 10 MG/ML
INJECTION, EMULSION INTRAVENOUS PRN
Status: DISCONTINUED | OUTPATIENT
Start: 2020-01-01 | End: 2020-01-01 | Stop reason: SDUPTHER

## 2020-01-01 RX ORDER — CELECOXIB 100 MG/1
200 CAPSULE ORAL ONCE
Status: COMPLETED | OUTPATIENT
Start: 2020-01-01 | End: 2020-01-01

## 2020-01-01 RX ORDER — SODIUM CHLORIDE, SODIUM LACTATE, POTASSIUM CHLORIDE, CALCIUM CHLORIDE 600; 310; 30; 20 MG/100ML; MG/100ML; MG/100ML; MG/100ML
INJECTION, SOLUTION INTRAVENOUS CONTINUOUS
Status: DISCONTINUED | OUTPATIENT
Start: 2020-01-01 | End: 2020-01-01

## 2020-01-01 RX ORDER — TAMSULOSIN HYDROCHLORIDE 0.4 MG/1
0.4 CAPSULE ORAL DAILY
Status: DISCONTINUED | OUTPATIENT
Start: 2020-01-01 | End: 2020-01-01 | Stop reason: HOSPADM

## 2020-01-01 RX ORDER — OXYCODONE HYDROCHLORIDE AND ACETAMINOPHEN 5; 325 MG/1; MG/1
2 TABLET ORAL PRN
Status: DISCONTINUED | OUTPATIENT
Start: 2020-01-01 | End: 2020-01-01 | Stop reason: HOSPADM

## 2020-01-01 RX ORDER — LABETALOL HYDROCHLORIDE 5 MG/ML
5 INJECTION, SOLUTION INTRAVENOUS EVERY 10 MIN PRN
Status: DISCONTINUED | OUTPATIENT
Start: 2020-01-01 | End: 2020-01-01 | Stop reason: HOSPADM

## 2020-01-01 RX ORDER — LISINOPRIL AND HYDROCHLOROTHIAZIDE 12.5; 1 MG/1; MG/1
1 TABLET ORAL DAILY
Status: DISCONTINUED | OUTPATIENT
Start: 2020-01-01 | End: 2020-01-01 | Stop reason: CLARIF

## 2020-01-01 RX ORDER — MEPERIDINE HYDROCHLORIDE 50 MG/ML
12.5 INJECTION INTRAMUSCULAR; INTRAVENOUS; SUBCUTANEOUS EVERY 5 MIN PRN
Status: DISCONTINUED | OUTPATIENT
Start: 2020-01-01 | End: 2020-01-01 | Stop reason: HOSPADM

## 2020-01-01 RX ORDER — LIDOCAINE HYDROCHLORIDE 10 MG/ML
INJECTION, SOLUTION INFILTRATION; PERINEURAL PRN
Status: DISCONTINUED | OUTPATIENT
Start: 2020-01-01 | End: 2020-01-01 | Stop reason: SDUPTHER

## 2020-01-01 RX ORDER — TRAMADOL HYDROCHLORIDE 50 MG/1
50-100 TABLET ORAL EVERY 4 HOURS PRN
Qty: 50 TABLET | Refills: 0 | Status: SHIPPED | OUTPATIENT
Start: 2020-01-01 | End: 2020-01-01

## 2020-01-01 RX ORDER — HYDROCHLOROTHIAZIDE 25 MG/1
12.5 TABLET ORAL DAILY
Status: DISCONTINUED | OUTPATIENT
Start: 2020-01-01 | End: 2020-01-01 | Stop reason: HOSPADM

## 2020-01-01 RX ORDER — ONDANSETRON 2 MG/ML
INJECTION INTRAMUSCULAR; INTRAVENOUS
Status: DISPENSED
Start: 2020-01-01 | End: 2020-01-01

## 2020-01-01 RX ORDER — TRAMADOL HYDROCHLORIDE 50 MG/1
50 TABLET ORAL EVERY 6 HOURS PRN
Status: DISCONTINUED | OUTPATIENT
Start: 2020-01-01 | End: 2020-01-01 | Stop reason: HOSPADM

## 2020-01-01 RX ORDER — ONDANSETRON 2 MG/ML
4 INJECTION INTRAMUSCULAR; INTRAVENOUS PRN
Status: DISCONTINUED | OUTPATIENT
Start: 2020-01-01 | End: 2020-01-01 | Stop reason: HOSPADM

## 2020-01-01 RX ADMIN — CELECOXIB 100 MG: 100 CAPSULE ORAL at 20:00

## 2020-01-01 RX ADMIN — TAMSULOSIN HYDROCHLORIDE 0.4 MG: 0.4 CAPSULE ORAL at 20:00

## 2020-01-01 RX ADMIN — ONDANSETRON 4 MG: 2 INJECTION INTRAMUSCULAR; INTRAVENOUS at 10:06

## 2020-01-01 RX ADMIN — LIDOCAINE HYDROCHLORIDE 2 ML: 10 INJECTION, SOLUTION INFILTRATION; PERINEURAL at 12:19

## 2020-01-01 RX ADMIN — CEFAZOLIN SODIUM 2 G: 10 INJECTION, POWDER, FOR SOLUTION INTRAVENOUS at 12:31

## 2020-01-01 RX ADMIN — LIDOCAINE HYDROCHLORIDE 80 MG: 20 INJECTION, SOLUTION INFILTRATION; PERINEURAL at 12:24

## 2020-01-01 RX ADMIN — CELECOXIB 100 MG: 100 CAPSULE ORAL at 20:08

## 2020-01-01 RX ADMIN — TAMSULOSIN HYDROCHLORIDE 0.4 MG: 0.4 CAPSULE ORAL at 08:56

## 2020-01-01 RX ADMIN — ACETAMINOPHEN 650 MG: 325 TABLET ORAL at 08:55

## 2020-01-01 RX ADMIN — APIXABAN 2.5 MG: 2.5 TABLET, FILM COATED ORAL at 08:57

## 2020-01-01 RX ADMIN — CEFAZOLIN SODIUM 2 G: 10 INJECTION, POWDER, FOR SOLUTION INTRAVENOUS at 20:01

## 2020-01-01 RX ADMIN — ACETAMINOPHEN 650 MG: 325 TABLET ORAL at 20:07

## 2020-01-01 RX ADMIN — TAMSULOSIN HYDROCHLORIDE 0.4 MG: 0.4 CAPSULE ORAL at 08:54

## 2020-01-01 RX ADMIN — DOCUSATE SODIUM 50MG AND SENNOSIDES 8.6MG 1 TABLET: 8.6; 5 TABLET, FILM COATED ORAL at 08:57

## 2020-01-01 RX ADMIN — LISINOPRIL 10 MG: 10 TABLET ORAL at 08:58

## 2020-01-01 RX ADMIN — DEXAMETHASONE SODIUM PHOSPHATE 8 MG: 4 INJECTION, SOLUTION INTRAMUSCULAR; INTRAVENOUS at 12:28

## 2020-01-01 RX ADMIN — HYDROCHLOROTHIAZIDE 12.5 MG: 25 TABLET ORAL at 08:58

## 2020-01-01 RX ADMIN — PROPOFOL 30 MG: 10 INJECTION, EMULSION INTRAVENOUS at 12:36

## 2020-01-01 RX ADMIN — PROPOFOL 50 MCG/KG/MIN: 10 INJECTION, EMULSION INTRAVENOUS at 12:23

## 2020-01-01 RX ADMIN — ACETAMINOPHEN 650 MG: 325 TABLET ORAL at 15:27

## 2020-01-01 RX ADMIN — ACETAMINOPHEN 1000 MG: 500 TABLET ORAL at 10:56

## 2020-01-01 RX ADMIN — CELECOXIB 100 MG: 100 CAPSULE ORAL at 08:54

## 2020-01-01 RX ADMIN — APIXABAN 2.5 MG: 2.5 TABLET, FILM COATED ORAL at 20:08

## 2020-01-01 RX ADMIN — CELECOXIB 100 MG: 100 CAPSULE ORAL at 08:56

## 2020-01-01 RX ADMIN — SODIUM CHLORIDE, SODIUM LACTATE, POTASSIUM CHLORIDE, AND CALCIUM CHLORIDE: .6; .31; .03; .02 INJECTION, SOLUTION INTRAVENOUS at 13:18

## 2020-01-01 RX ADMIN — HYDROCODONE BITARTRATE AND ACETAMINOPHEN 2 TABLET: 5; 325 TABLET ORAL at 06:03

## 2020-01-01 RX ADMIN — Medication 10 ML: at 08:57

## 2020-01-01 RX ADMIN — SODIUM CHLORIDE, SODIUM LACTATE, POTASSIUM CHLORIDE, AND CALCIUM CHLORIDE: .6; .31; .03; .02 INJECTION, SOLUTION INTRAVENOUS at 12:01

## 2020-01-01 RX ADMIN — CEFAZOLIN SODIUM 2 G: 10 INJECTION, POWDER, FOR SOLUTION INTRAVENOUS at 03:35

## 2020-01-01 RX ADMIN — DOCUSATE SODIUM 50MG AND SENNOSIDES 8.6MG 1 TABLET: 8.6; 5 TABLET, FILM COATED ORAL at 20:00

## 2020-01-01 RX ADMIN — ACETAMINOPHEN 650 MG: 325 TABLET ORAL at 20:00

## 2020-01-01 RX ADMIN — DOCUSATE SODIUM 50MG AND SENNOSIDES 8.6MG 1 TABLET: 8.6; 5 TABLET, FILM COATED ORAL at 08:54

## 2020-01-01 RX ADMIN — Medication 10 ML: at 20:08

## 2020-01-01 RX ADMIN — BUPIVACAINE HYDROCHLORIDE 2 ML: 7.5 INJECTION, SOLUTION SUBARACHNOID at 12:21

## 2020-01-01 RX ADMIN — SODIUM CHLORIDE: 9 INJECTION, SOLUTION INTRAVENOUS at 20:07

## 2020-01-01 RX ADMIN — APIXABAN 2.5 MG: 2.5 TABLET, FILM COATED ORAL at 08:54

## 2020-01-01 RX ADMIN — PROPOFOL 20 MG: 10 INJECTION, EMULSION INTRAVENOUS at 12:23

## 2020-01-01 RX ADMIN — CELECOXIB 200 MG: 100 CAPSULE ORAL at 10:56

## 2020-01-01 ASSESSMENT — PAIN DESCRIPTION - FREQUENCY
FREQUENCY: CONTINUOUS
FREQUENCY: CONTINUOUS

## 2020-01-01 ASSESSMENT — PULMONARY FUNCTION TESTS
PIF_VALUE: 0
PIF_VALUE: 1
PIF_VALUE: 0
PIF_VALUE: 1
PIF_VALUE: 0
PIF_VALUE: 1
PIF_VALUE: 0
PIF_VALUE: 1
PIF_VALUE: 0
PIF_VALUE: 1
PIF_VALUE: 0
PIF_VALUE: 1
PIF_VALUE: 0
PIF_VALUE: 1
PIF_VALUE: 0
PIF_VALUE: 0
PIF_VALUE: 1
PIF_VALUE: 0
PIF_VALUE: 0

## 2020-01-01 ASSESSMENT — PAIN DESCRIPTION - LOCATION
LOCATION: KNEE

## 2020-01-01 ASSESSMENT — PAIN DESCRIPTION - PAIN TYPE
TYPE: SURGICAL PAIN
TYPE: ACUTE PAIN;SURGICAL PAIN
TYPE: SURGICAL PAIN

## 2020-01-01 ASSESSMENT — PAIN SCALES - GENERAL
PAINLEVEL_OUTOF10: 0
PAINLEVEL_OUTOF10: 2
PAINLEVEL_OUTOF10: 4
PAINLEVEL_OUTOF10: 2
PAINLEVEL_OUTOF10: 1
PAINLEVEL_OUTOF10: 1
PAINLEVEL_OUTOF10: 2
PAINLEVEL_OUTOF10: 2
PAINLEVEL_OUTOF10: 1
PAINLEVEL_OUTOF10: 2
PAINLEVEL_OUTOF10: 3
PAINLEVEL_OUTOF10: 2
PAINLEVEL_OUTOF10: 3

## 2020-01-01 ASSESSMENT — PAIN DESCRIPTION - ONSET
ONSET: ON-GOING

## 2020-01-01 ASSESSMENT — PATIENT HEALTH QUESTIONNAIRE - PHQ9
2. FEELING DOWN, DEPRESSED OR HOPELESS: 0
SUM OF ALL RESPONSES TO PHQ9 QUESTIONS 1 & 2: 0
1. LITTLE INTEREST OR PLEASURE IN DOING THINGS: 0
SUM OF ALL RESPONSES TO PHQ QUESTIONS 1-9: 0
SUM OF ALL RESPONSES TO PHQ QUESTIONS 1-9: 0

## 2020-01-01 ASSESSMENT — PAIN DESCRIPTION - DESCRIPTORS
DESCRIPTORS: DISCOMFORT
DESCRIPTORS: DISCOMFORT

## 2020-01-01 ASSESSMENT — ENCOUNTER SYMPTOMS
RESPIRATORY NEGATIVE: 1
GASTROINTESTINAL NEGATIVE: 1

## 2020-01-01 ASSESSMENT — PAIN DESCRIPTION - ORIENTATION
ORIENTATION: RIGHT

## 2020-01-01 ASSESSMENT — PAIN DESCRIPTION - PROGRESSION
CLINICAL_PROGRESSION: NOT CHANGED

## 2020-01-01 ASSESSMENT — PAIN - FUNCTIONAL ASSESSMENT
PAIN_FUNCTIONAL_ASSESSMENT: ACTIVITIES ARE NOT PREVENTED
PAIN_FUNCTIONAL_ASSESSMENT: ACTIVITIES ARE NOT PREVENTED

## 2020-01-08 ENCOUNTER — OFFICE VISIT (OUTPATIENT)
Dept: FAMILY MEDICINE CLINIC | Age: 75
End: 2020-01-08
Payer: MEDICARE

## 2020-01-08 VITALS
OXYGEN SATURATION: 95 % | BODY MASS INDEX: 23.3 KG/M2 | RESPIRATION RATE: 14 BRPM | HEART RATE: 70 BPM | SYSTOLIC BLOOD PRESSURE: 124 MMHG | DIASTOLIC BLOOD PRESSURE: 66 MMHG | WEIGHT: 145 LBS | HEIGHT: 66 IN

## 2020-01-08 PROBLEM — E78.5 HYPERLIPIDEMIA: Status: ACTIVE | Noted: 2017-03-31

## 2020-01-08 PROBLEM — M50.323 OTHER CERVICAL DISC DEGENERATION AT C6-C7 LEVEL: Status: ACTIVE | Noted: 2017-03-31

## 2020-01-08 PROBLEM — M50.321 OTHER CERVICAL DISC DEGENERATION AT C4-C5 LEVEL: Status: ACTIVE | Noted: 2017-03-31

## 2020-01-08 PROCEDURE — G8482 FLU IMMUNIZE ORDER/ADMIN: HCPCS | Performed by: FAMILY MEDICINE

## 2020-01-08 PROCEDURE — 1123F ACP DISCUSS/DSCN MKR DOCD: CPT | Performed by: FAMILY MEDICINE

## 2020-01-08 PROCEDURE — G8420 CALC BMI NORM PARAMETERS: HCPCS | Performed by: FAMILY MEDICINE

## 2020-01-08 PROCEDURE — 3017F COLORECTAL CA SCREEN DOC REV: CPT | Performed by: FAMILY MEDICINE

## 2020-01-08 PROCEDURE — 1036F TOBACCO NON-USER: CPT | Performed by: FAMILY MEDICINE

## 2020-01-08 PROCEDURE — G8427 DOCREV CUR MEDS BY ELIG CLIN: HCPCS | Performed by: FAMILY MEDICINE

## 2020-01-08 PROCEDURE — 99213 OFFICE O/P EST LOW 20 MIN: CPT | Performed by: FAMILY MEDICINE

## 2020-01-08 PROCEDURE — 4040F PNEUMOC VAC/ADMIN/RCVD: CPT | Performed by: FAMILY MEDICINE

## 2020-01-08 ASSESSMENT — PATIENT HEALTH QUESTIONNAIRE - PHQ9
SUM OF ALL RESPONSES TO PHQ9 QUESTIONS 1 & 2: 0
SUM OF ALL RESPONSES TO PHQ QUESTIONS 1-9: 0
SUM OF ALL RESPONSES TO PHQ QUESTIONS 1-9: 0
2. FEELING DOWN, DEPRESSED OR HOPELESS: 0
1. LITTLE INTEREST OR PLEASURE IN DOING THINGS: 0

## 2020-01-08 NOTE — PROGRESS NOTES
Subjective:      Patient ID: Oneil Cornejo is a 76 y.o. y.o. male. Right hand pain  MP at the long finger more sore / painful.   Progressive , 1 year        HPI      Chief Complaint   Patient presents with    Hand Pain     Right middle knuckle swollen has been this way for a while is steadily getting worse       Allergies   Allergen Reactions    Clarithromycin     Gabapentin     Pravastatin     Promethazine-Codeine     Vicodin [Hydrocodone-Acetaminophen]      Causes prostate swelling- needs to take terazosin if takes vicodin    Zegerid [Omeprazole]        Past Medical History:   Diagnosis Date    Arthritis     GERD (gastroesophageal reflux disease)     resolved with surgery    Hiatal hernia     Hypertension        Past Surgical History:   Procedure Laterality Date    COLONOSCOPY  10/29/2013    FOREARM SURGERY Left 10/04/2017    excision left forearm mass    GASTRIC FUNDOPLICATION  4825    HERNIA REPAIR  2015    ROBOTIC ASSISTED HIATAL HERNIA REPAIR WITH NISSEN    JOINT REPLACEMENT Left 13    left total knee replacement    KNEE ARTHROSCOPY      left    UPPER GASTROINTESTINAL ENDOSCOPY      UPPER GASTROINTESTINAL ENDOSCOPY      bx    VASECTOMY         Social History     Socioeconomic History    Marital status:      Spouse name: Not on file    Number of children: Not on file    Years of education: Not on file    Highest education level: Not on file   Occupational History    Not on file   Social Needs    Financial resource strain: Not on file    Food insecurity:     Worry: Not on file     Inability: Not on file    Transportation needs:     Medical: Not on file     Non-medical: Not on file   Tobacco Use    Smoking status: Former Smoker     Packs/day: 0.50     Years: 17.00     Pack years: 8.50     Last attempt to quit: 10/21/1976     Years since quittin.2    Smokeless tobacco: Never Used   Substance and Sexual Activity    Alcohol use: No     Alcohol/week: 0.0 standard drinks    Drug use: No    Sexual activity: Not Currently   Lifestyle    Physical activity:     Days per week: Not on file     Minutes per session: Not on file    Stress: Not on file   Relationships    Social connections:     Talks on phone: Not on file     Gets together: Not on file     Attends Cheondoism service: Not on file     Active member of club or organization: Not on file     Attends meetings of clubs or organizations: Not on file     Relationship status: Not on file    Intimate partner violence:     Fear of current or ex partner: Not on file     Emotionally abused: Not on file     Physically abused: Not on file     Forced sexual activity: Not on file   Other Topics Concern    Not on file   Social History Narrative    Not on file       Family History   Problem Relation Age of Onset    High Blood Pressure Mother     High Blood Pressure Father     Cancer Father         lung    High Blood Pressure Sister     High Blood Pressure Brother        Vitals:    01/08/20 1328   BP: 124/66   Pulse: 70   Resp: 14   SpO2: 95%       Wt Readings from Last 3 Encounters:   01/08/20 145 lb (65.8 kg)   08/27/19 147 lb (66.7 kg)   06/10/19 148 lb (67.1 kg)       Review of Systems   Constitutional: Negative. Musculoskeletal:        Right hand pain. Shooting / severe with some positions or bending back         Objective:   Physical Exam  Vitals signs reviewed. Constitutional:       General: He is not in acute distress. Appearance: He is not ill-appearing. Pulmonary:      Effort: Pulmonary effort is normal. No respiratory distress. Musculoskeletal:      Comments: Right hand-  Long finger MP joint. Tender and a bit stiff. Extension seems limited some. No acute synovitis   Neurological:      Mental Status: He is alert and oriented to person, place, and time. Cranial Nerves: No cranial nerve deficit. Assessment:      Arthritis of the hands        Plan:     Discussed the sx.   Hand

## 2020-01-13 RX ORDER — SILDENAFIL 100 MG/1
TABLET, FILM COATED ORAL
Qty: 6 TABLET | Refills: 1 | Status: SHIPPED | OUTPATIENT
Start: 2020-01-13 | End: 2020-05-11

## 2020-01-13 NOTE — TELEPHONE ENCOUNTER
Future Appointments   Date Time Provider Viridiana Del Toro   1/20/2020  1:30 PM MD EILEEN Underwood AND JOSE

## 2020-01-20 ENCOUNTER — OFFICE VISIT (OUTPATIENT)
Dept: ORTHOPEDIC SURGERY | Age: 75
End: 2020-01-20
Payer: MEDICARE

## 2020-01-20 VITALS — RESPIRATION RATE: 16 BRPM | HEIGHT: 66 IN | BODY MASS INDEX: 23.31 KG/M2 | WEIGHT: 145.06 LBS

## 2020-01-20 PROBLEM — M19.041 ARTHRITIS OF RIGHT HAND: Status: ACTIVE | Noted: 2020-01-20

## 2020-01-20 PROCEDURE — 1036F TOBACCO NON-USER: CPT | Performed by: ORTHOPAEDIC SURGERY

## 2020-01-20 PROCEDURE — 3017F COLORECTAL CA SCREEN DOC REV: CPT | Performed by: ORTHOPAEDIC SURGERY

## 2020-01-20 PROCEDURE — 99214 OFFICE O/P EST MOD 30 MIN: CPT | Performed by: ORTHOPAEDIC SURGERY

## 2020-01-20 PROCEDURE — G8427 DOCREV CUR MEDS BY ELIG CLIN: HCPCS | Performed by: ORTHOPAEDIC SURGERY

## 2020-01-20 PROCEDURE — 1123F ACP DISCUSS/DSCN MKR DOCD: CPT | Performed by: ORTHOPAEDIC SURGERY

## 2020-01-20 PROCEDURE — 4040F PNEUMOC VAC/ADMIN/RCVD: CPT | Performed by: ORTHOPAEDIC SURGERY

## 2020-01-20 PROCEDURE — G8482 FLU IMMUNIZE ORDER/ADMIN: HCPCS | Performed by: ORTHOPAEDIC SURGERY

## 2020-01-20 PROCEDURE — G8420 CALC BMI NORM PARAMETERS: HCPCS | Performed by: ORTHOPAEDIC SURGERY

## 2020-02-04 ENCOUNTER — OFFICE VISIT (OUTPATIENT)
Dept: FAMILY MEDICINE CLINIC | Age: 75
End: 2020-02-04
Payer: MEDICARE

## 2020-02-04 VITALS
HEIGHT: 66 IN | SYSTOLIC BLOOD PRESSURE: 116 MMHG | DIASTOLIC BLOOD PRESSURE: 64 MMHG | BODY MASS INDEX: 23.3 KG/M2 | RESPIRATION RATE: 14 BRPM | OXYGEN SATURATION: 96 % | HEART RATE: 68 BPM | WEIGHT: 145 LBS

## 2020-02-04 PROCEDURE — G0439 PPPS, SUBSEQ VISIT: HCPCS | Performed by: FAMILY MEDICINE

## 2020-02-04 PROCEDURE — 3017F COLORECTAL CA SCREEN DOC REV: CPT | Performed by: FAMILY MEDICINE

## 2020-02-04 PROCEDURE — 4040F PNEUMOC VAC/ADMIN/RCVD: CPT | Performed by: FAMILY MEDICINE

## 2020-02-04 PROCEDURE — G8482 FLU IMMUNIZE ORDER/ADMIN: HCPCS | Performed by: FAMILY MEDICINE

## 2020-02-04 PROCEDURE — 1123F ACP DISCUSS/DSCN MKR DOCD: CPT | Performed by: FAMILY MEDICINE

## 2020-02-04 ASSESSMENT — LIFESTYLE VARIABLES: HOW OFTEN DO YOU HAVE A DRINK CONTAINING ALCOHOL: 0

## 2020-02-04 ASSESSMENT — PATIENT HEALTH QUESTIONNAIRE - PHQ9
SUM OF ALL RESPONSES TO PHQ QUESTIONS 1-9: 0
SUM OF ALL RESPONSES TO PHQ QUESTIONS 1-9: 0

## 2020-02-04 NOTE — PROGRESS NOTES
Subjective:      Patient ID: Eldon Rosa is a 76 y.o. y.o. male. Here for Medicare Wellness Visit.   HPI      Chief Complaint   Patient presents with    Medicare AWV       Allergies   Allergen Reactions    Clarithromycin     Gabapentin     Pravastatin     Promethazine-Codeine     Vicodin [Hydrocodone-Acetaminophen]      Causes prostate swelling- needs to take terazosin if takes vicodin    Zegerid [Omeprazole]        Past Medical History:   Diagnosis Date    Arthritis     GERD (gastroesophageal reflux disease)     resolved with surgery    Hiatal hernia     Hypertension        Past Surgical History:   Procedure Laterality Date    COLONOSCOPY  10/29/2013    FOREARM SURGERY Left 10/04/2017    excision left forearm mass    GASTRIC FUNDOPLICATION  1901    HERNIA REPAIR  2015    ROBOTIC ASSISTED HIATAL HERNIA REPAIR WITH NISSEN    JOINT REPLACEMENT Left 13    left total knee replacement    KNEE ARTHROSCOPY      left    UPPER GASTROINTESTINAL ENDOSCOPY      UPPER GASTROINTESTINAL ENDOSCOPY      bx    VASECTOMY         Social History     Socioeconomic History    Marital status:      Spouse name: Not on file    Number of children: Not on file    Years of education: Not on file    Highest education level: Not on file   Occupational History    Not on file   Social Needs    Financial resource strain: Not on file    Food insecurity:     Worry: Not on file     Inability: Not on file    Transportation needs:     Medical: Not on file     Non-medical: Not on file   Tobacco Use    Smoking status: Former Smoker     Packs/day: 0.50     Years: 17.00     Pack years: 8.50     Last attempt to quit: 10/21/1976     Years since quittin.3    Smokeless tobacco: Never Used   Substance and Sexual Activity    Alcohol use: No     Alcohol/week: 0.0 standard drinks    Drug use: No    Sexual activity: Not Currently   Lifestyle    Physical activity:     Days per week: Not on file <[id number]> Sex: Male   Age: 76 y.o. Ethnicity: Non-/Non    : 1945 Race: Severiano Christianson is here for Medicare AWV    Screenings for behavioral, psychosocial and functional/safety risks, and cognitive dysfunction are all negative except as indicated below. These results, as well as other patient data from the [address] [facility] [address] form, are documented in Flowsheets linked to this Encounter. Allergies   Allergen Reactions    Clarithromycin     Gabapentin     Pravastatin     Promethazine-Codeine     Vicodin [Hydrocodone-Acetaminophen]      Causes prostate swelling- needs to take terazosin if takes vicodin    Zegerid [Omeprazole]        Prior to Visit Medications    Medication Sig Taking? Authorizing Provider   sildenafil (VIAGRA) 100 MG tablet TAKE ONE TABLET BY MOUTH AS NEEDED FOR ERECTILE DYSFUNCTION. ONLY TAKE ONCE IN 24 HOURS Yes Sowmya Becerra DO   lisinopril-hydrochlorothiazide (PRINZIDE;ZESTORETIC) 10-12.5 MG per tablet TAKE ONE TABLET BY MOUTH DAILY FOR HIGH BLOOD PRESSURE Yes Sowmya Becerra DO   aspirin 81 MG EC tablet Take 81 mg by mouth daily.    Yes Historical Provider, MD   ibuprofen (ADVIL;MOTRIN) 200 MG tablet Take 200 mg by mouth as needed for Pain  Historical Provider, MD       Past Medical History:   Diagnosis Date    Arthritis     GERD (gastroesophageal reflux disease)     resolved with surgery    Hiatal hernia     Hypertension        Past Surgical History:   Procedure Laterality Date    COLONOSCOPY  10/29/2013    FOREARM SURGERY Left 10/04/2017    excision left forearm mass    GASTRIC FUNDOPLICATION  4314    HERNIA REPAIR  2015    ROBOTIC ASSISTED HIATAL HERNIA REPAIR WITH NISSEN    JOINT REPLACEMENT Left 13    left total knee replacement    KNEE ARTHROSCOPY      left    UPPER GASTROINTESTINAL ENDOSCOPY      UPPER GASTROINTESTINAL ENDOSCOPY      bx    VASECTOMY         Family History   Problem Relation Age of Onset    High notice any trouble with your hearing?: (!) Yes(Hearing aides)  Do you have difficulty driving, watching TV, or doing any of your daily activities because of your eyesight?: No  Have you had an eye exam within the past year?: Yes  Hearing/Vision Interventions:  · Patient is somewhat hard of hearing but does have hearing aids. Personalized Preventive Plan   Current Health Maintenance Status  Immunization History   Administered Date(s) Administered    Influenza Virus Vaccine 10/23/2014    Influenza, High Dose (Fluzone 65 yrs and older) 10/29/2015, 11/17/2016, 11/29/2017, 10/02/2018, 11/12/2019    Influenza, MDCK Quadv, with preserv IM (Flucelvax 4 yrs and older) 08/27/2019    Pneumococcal Conjugate 13-valent ([id number]) 06/25/2018    Pneumococcal Polysaccharide (Ifxssgapr73) 02/03/2013    Tdap (Boostrix, Adacel) 06/08/2017    Zoster Live (Zostavax) 08/27/2014    Zoster Recombinant (Shingrix) 08/24/2014, 07/19/2019, 08/27/2019        Health Maintenance   Topic Date Due    Annual Wellness Visit (AWV)  05/29/2019    Potassium monitoring  11/20/2019    Creatinine monitoring  11/20/2019    AAA screen  01/08/2021 (Originally 1945)    Hepatitis C screen  01/08/2021 (Originally 1945)    Lipid screen  06/15/2023    Colon cancer screen colonoscopy  10/29/2023    DTaP/Tdap/Td vaccine (2 - Td) 06/08/2027    Flu vaccine  Completed    Shingles Vaccine  Completed    Pneumococcal 65+ years Vaccine  Completed    Hepatitis A vaccine  Aged Out    Hepatitis B vaccine  Aged Out    Hib vaccine  Aged Out    Meningococcal (ACWY) vaccine  Aged Out     Recommendations for [facility] Due: see orders and patient instructions/AVS.  . Recommended screening schedule for the next 5-10 years is provided to the patient in written form: see Patient Instructions/AVS.    There are no diagnoses linked to this encounter.

## 2020-02-04 NOTE — PATIENT INSTRUCTIONS
Continue the same medications    Stay active. Get a physical in the next few months  Personalized Preventive Plan for Tyrell Padgett - 2/4/2020  Medicare offers a range of preventive health benefits. Some of the tests and screenings are paid in full while other may be subject to a deductible, co-insurance, and/or copay. Some of these benefits include a comprehensive review of your medical history including lifestyle, illnesses that may run in your family, and various assessments and screenings as appropriate. After reviewing your medical record and screening and assessments performed today your provider may have ordered immunizations, labs, imaging, and/or referrals for you. A list of these orders (if applicable) as well as your Preventive Care list are included within your After Visit Summary for your review. Other Preventive Recommendations:    · A preventive eye exam performed by an eye specialist is recommended every 1-2 years to screen for glaucoma; cataracts, macular degeneration, and other eye disorders. · A preventive dental visit is recommended every 6 months. · Try to get at least 150 minutes of exercise per week or 10,000 steps per day on a pedometer . · Order or download the FREE \"Exercise & Physical Activity: Your Everyday Guide\" from [employer]. Call 4-302.903.2923 or search [employer] online. · You need 9028-4503 mg of calcium and 2812-4477 IU of vitamin D per day. It is possible to meet your calcium requirement with diet alone, but a vitamin D supplement is usually necessary to meet this goal.  · When exposed to the sun, use a sunscreen that protects against both UVA and UVB radiation with an SPF of 30 or greater. Reapply every 2 to 3 hours or after sweating, drying off with a towel, or swimming. · Always wear a seat belt when traveling in a car. Always wear a helmet when riding a bicycle or motorcycle.

## 2020-04-15 ENCOUNTER — VIRTUAL VISIT (OUTPATIENT)
Dept: FAMILY MEDICINE CLINIC | Age: 75
End: 2020-04-15
Payer: MEDICARE

## 2020-04-15 VITALS — BODY MASS INDEX: 23.14 KG/M2 | WEIGHT: 144 LBS | HEIGHT: 66 IN

## 2020-04-15 PROCEDURE — 99442 PR PHYS/QHP TELEPHONE EVALUATION 11-20 MIN: CPT | Performed by: FAMILY MEDICINE

## 2020-04-15 RX ORDER — PREDNISONE 10 MG/1
TABLET ORAL
Qty: 30 TABLET | Refills: 0 | Status: SHIPPED | OUTPATIENT
Start: 2020-04-15 | End: 2020-06-02

## 2020-04-15 RX ORDER — CEPHALEXIN 500 MG/1
500 CAPSULE ORAL 3 TIMES DAILY
Qty: 30 CAPSULE | Refills: 0 | Status: SHIPPED | OUTPATIENT
Start: 2020-04-15 | End: 2020-06-02

## 2020-04-15 ASSESSMENT — ENCOUNTER SYMPTOMS: RESPIRATORY NEGATIVE: 1

## 2020-04-15 NOTE — PROGRESS NOTES
Subjective:      Patient ID: Parisa Ro is a 76 y.o. y.o. male. Parisa Ro is a 76 y.o. male evaluated via telephone on 4/15/2020. Consent:  He and/or health care decision maker is aware that that he may receive a bill for this telephone service, depending on his insurance coverage, and has provided verbal consent to proceed: Yes      Documentation:  I communicated with the patient and/or health care decision maker about dermatitis. .   Details of this discussion including any medical advice provided: Is recorded in this electronic medical record. I affirm this is a Patient Initiated Episode with an Established Patient who has not had a related appointment within my department in the past 7 days or scheduled within the next 24 hours.     Total Time: minutes: 11-20 minutes    Note: not billable if this call serves to triage the patient into an appointment for the relevant concern      Mary Anne FOREMAN      Chief Complaint   Patient presents with    Arm Pain     Blisters on both arms about a week ago started Painful and Itchy Chipped a bunch of tree limbs and may have been exposed to Poison Ivy not sure       Allergies   Allergen Reactions    Clarithromycin     Gabapentin     Pravastatin     Promethazine-Codeine     Vicodin [Hydrocodone-Acetaminophen]      Causes prostate swelling- needs to take terazosin if takes vicodin    Zegerid [Omeprazole]        Past Medical History:   Diagnosis Date    Arthritis     GERD (gastroesophageal reflux disease)     resolved with surgery    Hiatal hernia     Hypertension        Past Surgical History:   Procedure Laterality Date    COLONOSCOPY  10/29/2013    FOREARM SURGERY Left 10/04/2017    excision left forearm mass    GASTRIC FUNDOPLICATION  feb 4303    HERNIA REPAIR  02/19/2015    ROBOTIC ASSISTED HIATAL HERNIA REPAIR WITH NISSEN    JOINT REPLACEMENT Left 01/31/13    left total knee replacement    KNEE ARTHROSCOPY      left    UPPER GASTROINTESTINAL ENDOSCOPY      UPPER GASTROINTESTINAL ENDOSCOPY  2015    bx    VASECTOMY         Social History     Socioeconomic History    Marital status:      Spouse name: Not on file    Number of children: Not on file    Years of education: Not on file    Highest education level: Not on file   Occupational History    Not on file   Social Needs    Financial resource strain: Not on file    Food insecurity     Worry: Not on file     Inability: Not on file    Transportation needs     Medical: Not on file     Non-medical: Not on file   Tobacco Use    Smoking status: Former Smoker     Packs/day: 0.50     Years: 17.00     Pack years: 8.50     Last attempt to quit: 10/21/1976     Years since quittin.5    Smokeless tobacco: Never Used   Substance and Sexual Activity    Alcohol use: No     Alcohol/week: 0.0 standard drinks    Drug use: No    Sexual activity: Not Currently   Lifestyle    Physical activity     Days per week: Not on file     Minutes per session: Not on file    Stress: Not on file   Relationships    Social connections     Talks on phone: Not on file     Gets together: Not on file     Attends Hoahaoism service: Not on file     Active member of club or organization: Not on file     Attends meetings of clubs or organizations: Not on file     Relationship status: Not on file    Intimate partner violence     Fear of current or ex partner: Not on file     Emotionally abused: Not on file     Physically abused: Not on file     Forced sexual activity: Not on file   Other Topics Concern    Not on file   Social History Narrative    Not on file       Family History   Problem Relation Age of Onset    High Blood Pressure Mother     High Blood Pressure Father     Cancer Father         lung    High Blood Pressure Sister     High Blood Pressure Brother        There were no vitals filed for this visit.     Wt Readings from Last 3 Encounters:   04/15/20 144 lb (65.3 kg)   20 145 lb

## 2020-05-11 RX ORDER — SILDENAFIL 100 MG/1
TABLET, FILM COATED ORAL
Qty: 6 TABLET | Refills: 1 | Status: SHIPPED | OUTPATIENT
Start: 2020-05-11 | End: 2020-08-10

## 2020-06-02 ENCOUNTER — VIRTUAL VISIT (OUTPATIENT)
Dept: FAMILY MEDICINE CLINIC | Age: 75
End: 2020-06-02
Payer: MEDICARE

## 2020-06-02 VITALS
DIASTOLIC BLOOD PRESSURE: 72 MMHG | BODY MASS INDEX: 23.3 KG/M2 | WEIGHT: 145 LBS | HEIGHT: 66 IN | SYSTOLIC BLOOD PRESSURE: 128 MMHG

## 2020-06-02 PROCEDURE — 3017F COLORECTAL CA SCREEN DOC REV: CPT | Performed by: FAMILY MEDICINE

## 2020-06-02 PROCEDURE — 1123F ACP DISCUSS/DSCN MKR DOCD: CPT | Performed by: FAMILY MEDICINE

## 2020-06-02 PROCEDURE — G8427 DOCREV CUR MEDS BY ELIG CLIN: HCPCS | Performed by: FAMILY MEDICINE

## 2020-06-02 PROCEDURE — 99213 OFFICE O/P EST LOW 20 MIN: CPT | Performed by: FAMILY MEDICINE

## 2020-06-02 PROCEDURE — 4040F PNEUMOC VAC/ADMIN/RCVD: CPT | Performed by: FAMILY MEDICINE

## 2020-06-02 RX ORDER — PREDNISONE 10 MG/1
TABLET ORAL
Qty: 20 TABLET | Refills: 0 | Status: SHIPPED | OUTPATIENT
Start: 2020-06-02 | End: 2020-01-01

## 2020-06-02 ASSESSMENT — PATIENT HEALTH QUESTIONNAIRE - PHQ9
SUM OF ALL RESPONSES TO PHQ QUESTIONS 1-9: 0
2. FEELING DOWN, DEPRESSED OR HOPELESS: 0
1. LITTLE INTEREST OR PLEASURE IN DOING THINGS: 0
SUM OF ALL RESPONSES TO PHQ QUESTIONS 1-9: 0
SUM OF ALL RESPONSES TO PHQ9 QUESTIONS 1 & 2: 0

## 2020-06-03 ASSESSMENT — ENCOUNTER SYMPTOMS: RESPIRATORY NEGATIVE: 1

## 2020-06-03 NOTE — PROGRESS NOTES
Lorie Nickerson is a 76 y.o. male being evaluated by a Virtual Visit (video visit) encounter to address concerns as mentioned above. A caregiver was present when appropriate. Due to this being a TeleHealth encounter (During [facility] public health emergency), evaluation of the following organ systems was limited: Vitals/Constitutional/EENT/Resp/CV/GI//MS/Neuro/Skin/Heme-Lymph-Imm. Pursuant to the emergency declaration under the [address] [place] [address] and the [employer] and [employer] Act, this Virtual Visit was conducted with patient's (and/or legal guardian's) consent, to reduce the patient's risk of exposure to COVID-19 and provide necessary medical care. The patient (and/or legal guardian) has also been advised to contact this office for worsening conditions or problems, and seek emergency medical treatment and/or call 911 if deemed necessary. Patient identification was verified at the start of the visit: Yes    Total time spent on this encounter: Not billed by time    Services were provided through a video synchronous discussion virtually to substitute for in-person clinic visit. Patient and provider were located at their individual homes. --Deveron Squibb, DO on 6/3/2020 at 11:00 AM    An electronic signature was used to authenticate this note.

## 2020-07-22 ENCOUNTER — OFFICE VISIT (OUTPATIENT)
Dept: ORTHOPEDIC SURGERY | Age: 75
End: 2020-07-22
Payer: MEDICARE

## 2020-07-22 VITALS — WEIGHT: 145 LBS | HEIGHT: 65 IN | BODY MASS INDEX: 24.16 KG/M2

## 2020-07-22 PROCEDURE — 1123F ACP DISCUSS/DSCN MKR DOCD: CPT | Performed by: ORTHOPAEDIC SURGERY

## 2020-07-22 PROCEDURE — 3017F COLORECTAL CA SCREEN DOC REV: CPT | Performed by: ORTHOPAEDIC SURGERY

## 2020-07-22 PROCEDURE — 1036F TOBACCO NON-USER: CPT | Performed by: ORTHOPAEDIC SURGERY

## 2020-07-22 PROCEDURE — G8428 CUR MEDS NOT DOCUMENT: HCPCS | Performed by: ORTHOPAEDIC SURGERY

## 2020-07-22 PROCEDURE — 4040F PNEUMOC VAC/ADMIN/RCVD: CPT | Performed by: ORTHOPAEDIC SURGERY

## 2020-07-22 PROCEDURE — G8420 CALC BMI NORM PARAMETERS: HCPCS | Performed by: ORTHOPAEDIC SURGERY

## 2020-07-22 PROCEDURE — 99214 OFFICE O/P EST MOD 30 MIN: CPT | Performed by: ORTHOPAEDIC SURGERY

## 2020-07-22 NOTE — PROGRESS NOTES
MD Edmund Medrano, [place]         Orthopaedic Surgery and Sports Medicine    Patient Name: Alayna Maxwell  YOB: 1945  Patient's PCP is Armando Morgan DO    SUBJECTIVE  Chief Complaint:  Knee Pain (RIGHT )      History of Present Illness:  Alayna Maxwell is a 76 y.o. male here regarding right knee pain. He is status post a previous left total knee arthroplasty that was performed in about 2013. He is done extremely well with his left knee. He remains very active. 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 The pain began over the last few years he is continued to get more symptomatic. The patient is currently ambulating independently      Location: global  Quality: aching, sharp and nagging  Pain Scale: 8/10  Context: overall course is worsening   Alleviating Factors: rest  Exacerbating Factors: activity  Associated Symptoms: catching    Sleep pattern is affected by the chief complaint: Yes  The patient has not had PT. The patient has not had an injection. The patient has taken NSAIDs. The patient is working. Outside reports reviewed: none. Pain Assessment:  Pain Assessment  Location of Pain: Knee  Location Modifiers: Right  Severity of Pain: 2  Quality of Pain: Popping, Dull, Aching  Frequency of Pain: Intermittent  Aggravating Factors: Bending, Stairs, Kneeling, Squatting  Relieving Factors: Rest, Other (Comment)  Result of Injury: No  Work-Related Injury: No  Are there other pain locations you wish to document?: No    Review of Systems:  Mayelin Burgess Runner's review of systems has been performed by intake and observation. All past and current ROS forms have been scanned into the medical record. He has been instructed to contact his primary care provider regarding ROS issues if not already being addressed at this time. There are no recent changes.  The most recent ROS was scanned into media on 7/22/2020    Past Medical History:  (see most recent intake form scanned into media on above date)  Past Medical History:   Diagnosis Date    Arthritis     GERD (gastroesophageal reflux disease)     resolved with surgery    Hiatal hernia     Hypertension        Past Surgical History:  (see most recent intake form scanned into media on above date)  Past Surgical History:   Procedure Laterality Date    COLONOSCOPY  10/29/2013    FOREARM SURGERY Left 10/04/2017    excision left forearm mass    GASTRIC FUNDOPLICATION  feb 7398    HERNIA REPAIR  02/19/2015    ROBOTIC ASSISTED HIATAL HERNIA REPAIR WITH NISSEN    JOINT REPLACEMENT Left 01/31/13    left total knee replacement    KNEE ARTHROSCOPY      left    UPPER GASTROINTESTINAL ENDOSCOPY      UPPER GASTROINTESTINAL ENDOSCOPY  2015    bx    VASECTOMY         Allergies:  (see most recent intake form scanned into media on above date)  Allergies   Allergen Reactions    Clarithromycin     Gabapentin     Pravastatin     Promethazine-Codeine     Vicodin [Hydrocodone-Acetaminophen]      Causes prostate swelling- needs to take terazosin if takes vicodin    Zegerid [Omeprazole]        Medications:  (see most recent intake form scanned into media on above date)  Current Outpatient Medications   Medication Sig Dispense Refill    predniSONE (DELTASONE) 10 MG tablet 4 a day x 2 days then 3 a day x 2 days then 2 a day x 2 days then 1 a day x 2 days 20 tablet 0    sildenafil (VIAGRA) 100 MG tablet TAKE ONE TABLET BY MOUTH AS NEEDED FOR ERECTILE DYSFUNCTION. ONLY TAKE ONCE IN 24 HOURS 6 tablet 1    lisinopril-hydroCHLOROthiazide (PRINZIDE;ZESTORETIC) 10-12.5 MG per tablet TAKE ONE TABLET BY MOUTH DAILY FOR HIGH BLOOD PRESSURE 90 tablet 3    ibuprofen (ADVIL;MOTRIN) 200 MG tablet Take 200 mg by mouth as needed for Pain      aspirin 81 MG EC tablet Take 81 mg by mouth daily.          No current facility-administered medications for this visit. OBJECTIVE  PHYSICAL EXAM  Vital Signs: There were no vitals filed for this visit. Body mass index is 24.13 kg/m². General Appearance: Patient is adequately groomed with no evidence of malnutrition   Orientation: Patient is alert and oriented to person, place and time  Mood and Affect: Neutral/Euthymic(normal)  Gait and Station: abnormal and antalgic. The patient can bear weight on the extremity. Right Knee Examination  Inspection:  Knee alignment: mild varus           no swelling noted. No erythema or ecchymosis. Palpation: moderate tenderness to palpation on the medial joint line. no effusion noted. Range of Motion:  Normal,   Stability and Special Testing: Beryl's test: negative             Laxity with varus stress testing: negative             Laxity with valgus stress testing: negative    Strength: No gross motor weakness noted. All muscle groups appear to be functioning. Motor exam of the lower extremities show quadriceps, hamstrings, foot dorsiflexion and plantarflexion grossly intact. Neurologic: Sensation to both feet is grossly intact to light touch. Vascular: The bilateral lower extremities are warm and well-perfused with brisk capillary refill. Lymphatic: The lymphatic examination bilaterally reveals all areas to be without enlargement or induration    Skin: intact with no cellulitis, rashes, ulcerations, lymphedema or cutaneous lesions noted. Additional Examinations:  Left Lower Extremity: Examination of the left lower extremity does not show any tenderness, deformity or injury. Range of motion is unremarkable. There is no gross instability. There are no rashes, ulcerations or lesions. Strength and tone are normal.      DIAGNOSTICS  Xrays obtained in office today:  Yes  Xrays reviewed today: Yes  4 views of the right knee show   Fracture: No   Dislocation: No  Patellofemoral arthritis: severe  Medial compartment: moderate  Lateral symptoms worsen or if new symptoms appear prior to the next scheduled visit. He is specifically instructed to contact the office between now and schedule appointment if he has concerns related to his condition or if he needs assistance in scheduling any above tests. He is welcome to call for an appointment sooner if he has any additional concerns or questions. This dictation was performed with a verbal recognition program (DRAGON) and it was checked for errors. It is possible that there are still dictated errors within this office note. If so, please bring any errors to my attention for an addendum. All efforts were made to ensure that this office note is accurate.

## 2020-07-27 ENCOUNTER — OFFICE VISIT (OUTPATIENT)
Dept: ORTHOPEDIC SURGERY | Age: 75
End: 2020-07-27
Payer: MEDICARE

## 2020-07-27 VITALS — HEIGHT: 65 IN | BODY MASS INDEX: 24.16 KG/M2 | WEIGHT: 145 LBS

## 2020-07-27 PROCEDURE — 1036F TOBACCO NON-USER: CPT | Performed by: ORTHOPAEDIC SURGERY

## 2020-07-27 PROCEDURE — L1830 KO IMMOB CANVAS LONG PRE OTS: HCPCS | Performed by: ORTHOPAEDIC SURGERY

## 2020-07-27 PROCEDURE — 1123F ACP DISCUSS/DSCN MKR DOCD: CPT | Performed by: ORTHOPAEDIC SURGERY

## 2020-07-27 PROCEDURE — G8420 CALC BMI NORM PARAMETERS: HCPCS | Performed by: ORTHOPAEDIC SURGERY

## 2020-07-27 PROCEDURE — 3017F COLORECTAL CA SCREEN DOC REV: CPT | Performed by: ORTHOPAEDIC SURGERY

## 2020-07-27 PROCEDURE — 99214 OFFICE O/P EST MOD 30 MIN: CPT | Performed by: ORTHOPAEDIC SURGERY

## 2020-07-27 PROCEDURE — 4040F PNEUMOC VAC/ADMIN/RCVD: CPT | Performed by: ORTHOPAEDIC SURGERY

## 2020-07-27 PROCEDURE — G8427 DOCREV CUR MEDS BY ELIG CLIN: HCPCS | Performed by: ORTHOPAEDIC SURGERY

## 2020-07-27 NOTE — PROGRESS NOTES
CHIEF COMPLAINT:    Chief Complaint   Patient presents with    Knee Pain     RIGHT KNEE PAIN. REF BY DTS       HISTORY OF PRESENT ILLNESS:                The patient is a 76 y.o. male this patient presents today for orthopedic evaluation regarding his right knee. He has a history of a LEFT total knee replacement in 2013 with Dr. Jose Baez and did very well. He has been having increased pain involving his right knee over the last few years. He does work a few days a week. He points to the medial aspect of his knee as the source of his pain. He is having increasing difficulty with activities of daily living and work activities as a result of his pain. He is interested in discussing total joint replacement. He is extremely pleased with his contralateral Lefton knee which is a [employer] as well.   Past Medical History:   Diagnosis Date    Arthritis     GERD (gastroesophageal reflux disease)     resolved with surgery    Hiatal hernia     Hypertension         Work Status: [employer] a couple days a week    The pain assessment was noted & is as follows:  Pain Assessment  Location of Pain: Knee  Location Modifiers: Right  Severity of Pain: 5  Quality of Pain: Aching, Dull, Sharp  Duration of Pain: Persistent  Frequency of Pain: Intermittent  Aggravating Factors: Stairs, Walking, Straightening, Stretching, Bending  Limiting Behavior: Some  Relieving Factors: Rest]      Work Status/Functionality:     Past Medical History: Medical history form was reviewed today & can be found in the media tab  Past Medical History:   Diagnosis Date    Arthritis     GERD (gastroesophageal reflux disease)     resolved with surgery    Hiatal hernia     Hypertension       Past Surgical History:     Past Surgical History:   Procedure Laterality Date    COLONOSCOPY  10/29/2013    FOREARM SURGERY Left 10/04/2017    excision left forearm mass    GASTRIC FUNDOPLICATION  feb 9386    HERNIA REPAIR  02/19/2015    ROBOTIC ASSISTED with no evidence of malnutrition. · Orientation: The patient is oriented to time, place and person. · Mood & Affect:The patient's mood and affect are appropriate       Right knee PHYSICAL EXAMINATION:  · Inspection: Moderate varus alignment. No rotational deformity. · Palpation: Under femoral on the medial femoral condyle and peripatellar region. Minimal laterally. · Range of Motion: 2-125 degrees without difficulty    · Strength: 5/5    · Special Tests: No ligamentous instability. Negative Homans test.  Negative logroll examination. · Skin:  There are no rashes, ulcerations or lesions. · There are no distal dysvascular changes     Gait & station: Normal      Additional Examinations: The Lefton total knee demonstrates a well-healed surgical incision. Excellent alignment and range of motion of 0 125 degrees. Diagnostic Testing: The following x rays were read and interpreted by myself      1. I reviewed previous x-rays from last week. Patient has bone-on-bone medial compartment osteoarthritis with moderate varus alignment. Bone-on-bone patellofemoral disease as well. Orders   No orders of the defined types were placed in this encounter. Assessment / Treatment Plan:     1. End-stage osteoarthritis of the right knee. The patient is interested in pursuing total joint replacement surgery in lieu of going through a long course of injection therapies braces therapy etc.    2.  He is obviously familiar with the surgical procedure. He patient matched to a [address] [place] [address] Street knee replacement. We discussed the risk, benefits, and potential complications of total knee replacement arthroplasty surgery. The patient voiced their understanding to concerns that include infection, deep vein thrombosis, neurological injury, and delayed rehabilitation.  The patient also realizes that there are concerns regarding the potential need for manipulation under anesthesia if range of motion proves to be problematic. The patient also understands that there is always a chance of dystrophy and anesthetic complications that would include a stroke, cardiopulmonary pathology, and even death. We also discussed the rehabilitation involved with this operation and options that involved not only the hospitalization but then the potential of either going home with visiting home therapy versus going to a rehabilitation facility. We talked about the nuances of each of these treatments. The patient also realizes the need for the knee brace in the rehabilitation process as well as the very significant role that the patient plays in terms of rehabilitation after this type of operation. All questions were answered. 3.  Successful LEFT knee replacement. I have personally performed and/or participated in the history, exam and medical decision making and agree with all pertinent clinical information. I have also reviewed and agree with the past medical, family and social history unless otherwise noted. This dictation was performed with a verbal recognition program (DRAGON) and it was checked for errors. It is possible that there are still dictated errors within this office note. If so, please bring any errors to my attention for an addendum. All efforts were made to ensure that this office note is accurate.           Shanel Freed MD

## 2020-08-25 ENCOUNTER — TELEPHONE (OUTPATIENT)
Dept: FAMILY MEDICINE CLINIC | Age: 75
End: 2020-08-25

## 2020-08-25 NOTE — TELEPHONE ENCOUNTER
----- Message from Eben Kussmaul sent at 8/25/2020 12:18 PM EDT -----  Subject: Message to Provider    QUESTIONS  Information for Provider? Patient was referred to an audiologist a while   back but the doctor has stopped practicing. He wants to go to a new   specialist   Sebas Downing but has to have a referral  Daniela   to have his insurance cover it.   ---------------------------------------------------------------------------  --------------  [employer]  What is the best way for the office to contact you? Do not leave any   message   patient will call back for answer  Preferred Call Back Phone Number? [phone]  ---------------------------------------------------------------------------  --------------  SCRIPT ANSWERS  Relationship to Patient?  Self

## 2020-09-09 NOTE — TELEPHONE ENCOUNTER
[place]  [id number]    Date: 10/22/20  Type of SX:  INPATIENT CHANGED TO OUTPATIENT  Location: [facility]  CPT: 22877   DX Code: M17.11  SX area: [facility]  Insurance: [facility], [facility]  SX VALID 10/22/20 TO 01/20/21

## 2020-10-02 NOTE — TELEPHONE ENCOUNTER
patient already has: Walker, cane, icemachine  Bedroom on first or second floor: first  Bathroom on first or second floor: first  Weight bearing status: full  Pre-op ambulatory status:  Number of entry steps: FIVE  Caregiver assistance: Full time  [facility]  10/09/2020

## 2020-10-05 NOTE — PROGRESS NOTES
Subjective:      Patient ID: Sam Wilcox is a 76 y.o. y.o. male. The patient is being seen for a pre-operative medical evaluation and medical clearance for surgery. Has pain and stiffness right knee. OA dx. Going to have total knee, right, Oct 22  Dr Savana Jasso.   [facility]  HPI      Chief Complaint   Patient presents with    Pre-op Exam     Right knee replacement 10/22/20       Allergies   Allergen Reactions    Clarithromycin     Gabapentin     Pravastatin     Promethazine-Codeine     Vicodin [Hydrocodone-Acetaminophen]      Causes prostate swelling- needs to take terazosin if takes vicodin    Zegerid [Omeprazole]        Past Medical History:   Diagnosis Date    Arthritis     GERD (gastroesophageal reflux disease)     resolved with surgery    Hiatal hernia     Hypertension        Past Surgical History:   Procedure Laterality Date    COLONOSCOPY  10/29/2013    FOREARM SURGERY Left 10/04/2017    excision left forearm mass    GASTRIC FUNDOPLICATION  2942    HERNIA REPAIR  2015    ROBOTIC ASSISTED HIATAL HERNIA REPAIR WITH NISSEN    JOINT REPLACEMENT Left 13    left total knee replacement    KNEE ARTHROSCOPY      left    UPPER GASTROINTESTINAL ENDOSCOPY      UPPER GASTROINTESTINAL ENDOSCOPY      bx    VASECTOMY         Social History     Socioeconomic History    Marital status:      Spouse name: Not on file    Number of children: Not on file    Years of education: Not on file    Highest education level: Not on file   Occupational History    Not on file   Social Needs    Financial resource strain: Not on file    Food insecurity     Worry: Not on file     Inability: Not on file   [employer] needs     Medical: Not on file     Non-medical: Not on file   Tobacco Use    Smoking status: Former Smoker     Packs/day: 0.50     Years: 17.00     Pack years: 8.50     Last attempt to quit: 10/21/1976     Years since quittin.9    Smokeless tobacco: Never Used   Substance and Sexual Activity    Alcohol use: No     Alcohol/week: 0.0 standard drinks    Drug use: No    Sexual activity: Not Currently   Lifestyle    Physical activity     Days per week: Not on file     Minutes per session: Not on file    Stress: Not on file   Relationships    Social connections     Talks on phone: Not on file     Gets together: Not on file     Attends Nondenominational service: Not on file     Active member of club or organization: Not on file     Attends meetings of clubs or organizations: Not on file     Relationship status: Not on file    Intimate partner violence     Fear of current or ex partner: Not on file     Emotionally abused: Not on file     Physically abused: Not on file     Forced sexual activity: Not on file   Other Topics Concern    Not on file   Social History Narrative    Not on file       Family History   Problem Relation Age of Onset    High Blood Pressure Mother     High Blood Pressure Father     Cancer Father         lung    High Blood Pressure Sister     High Blood Pressure Brother        Vitals:    10/05/20 0941   BP: 126/72   Pulse: 70   Resp: 14   Temp: 98.3 °F (36.8 °C)   SpO2: 98%       Wt Readings from Last 3 Encounters:   10/05/20 145 lb (65.8 kg)   07/27/20 145 lb (65.8 kg)   07/22/20 145 lb (65.8 kg)       Review of Systems   Constitutional: Negative for activity change, appetite change and unexpected weight change. HENT: Positive for hearing loss. Has hearing aids   Respiratory: Negative. Cardiovascular: Negative. Gastrointestinal: Negative. Genitourinary: Negative for dysuria, frequency and urgency. Pain meds slow him down / shut him down. Last week left testicle was sore / tender. at the bottom. Has improved and near normal now   Musculoskeletal: Positive for arthralgias. Right knee pain    Hx left total knee replacement   Skin: Negative. Neurological: Negative.     Psychiatric/Behavioral: Negative for dysphoric mood, self-injury, sleep disturbance and suicidal ideas. The patient is not hyperactive. Objective:   Physical Exam  Constitutional:       Appearance: Normal appearance. He is not ill-appearing. HENT:      Right Ear: Tympanic membrane and ear canal normal.      Left Ear: Tympanic membrane and ear canal normal.      Ears:      Comments: Hearing aids  Eyes:      General: No scleral icterus. Extraocular Movements: Extraocular movements intact. Neck:      Vascular: No carotid bruit. Cardiovascular:      Rate and Rhythm: Normal rate and regular rhythm. Pulses: Normal pulses. Heart sounds: Normal heart sounds. Pulmonary:      Effort: Pulmonary effort is normal.      Breath sounds: Normal breath sounds. No wheezing. Abdominal:      General: Abdomen is flat. Bowel sounds are normal.      Palpations: Abdomen is soft. There is no mass. Tenderness: There is no abdominal tenderness. Genitourinary:     Comments: Slightly tender left scrotum / epididymus    Neg hernia  Musculoskeletal:      Right lower leg: No edema. Left lower leg: No edema. Comments: Right knee a bit crepitant ,  A bit lax feeling   Lymphadenopathy:      Cervical: No cervical adenopathy. Skin:     General: Skin is warm and dry. Neurological:      General: No focal deficit present. Mental Status: He is alert and oriented to person, place, and time. Psychiatric:         Mood and Affect: Mood normal.         Behavior: Behavior normal.         Thought Content: Thought content normal.         Assessment:       Diagnosis Orders   1. Pre-op exam  EKG 12 Lead   OA, right knee  Hypertension, controlled  Hyperlipidemia   Testalgia, improving  Plan:     EKG ok  Going to have labs at [facility]  Call if the testicle does not resolve. Flu vaccine today  Sensitive to doses of Narcotic meds-  -Urinary retention    Medically stable, okay for surgery.           Current Outpatient Medications   Medication Sig Dispense Refill  sildenafil (VIAGRA) 100 MG tablet TAKE ONE TABLET BY MOUTH AS NEEDED FOR ERECTILE DYSFUNCTION. ONLY TAKE ONCE IN 24 HOURS 6 tablet 2    lisinopril-hydroCHLOROthiazide (PRINZIDE;ZESTORETIC) 10-12.5 MG per tablet TAKE ONE TABLET BY MOUTH DAILY FOR HIGH BLOOD PRESSURE 90 tablet 3    ibuprofen (ADVIL;MOTRIN) 200 MG tablet Take 200 mg by mouth as needed for Pain      aspirin 81 MG EC tablet Take 81 mg by mouth daily. No current facility-administered medications for this visit.

## 2020-10-05 NOTE — PROGRESS NOTES
Vaccine Information Sheet, \"Influenza - Inactivated\"  given to Tomasz Benson, or parent/legal guardian of  Tomasz Benson and verbalized understanding. Patient responses:    Have you ever had a reaction to a flu vaccine? No  Are you able to eat eggs without adverse effects? Yes  Do you have any current illness? No  Have you ever had Guillian Emmett Syndrome? No    Flu vaccine given per order. Please see immunization tab.

## 2020-10-15 NOTE — PROGRESS NOTES
Obstructive Sleep Apnea (JOHNNY) Screening     Patient:  Jie Cr    YOB: 1945      Medical Record #:  [id number]                     Date:  10/15/2020     1. Are you a loud and/or regular snorer? []  Yes       [x] No    2. Have you been observed to gasp or stop breathing during sleep? []  Yes       [x] No    3. Do you feel tired or groggy upon awakening or do you awaken with a headache?           []  Yes       [] No    4. Are you often tired or fatigued during the wake time hours? []  Yes       [] No    5. Do you fall asleep sitting, reading, watching TV or driving? []  Yes       [] No    6. Do you often have problems with memory or concentration? []  Yes       [] No    **If patient's score is ? 3 they are considered high risk for JOHNNY. An Anesthesia provider will evaluate the patient and develop a plan of care the day of surgery. Note:  If the patient's BMI is more than 35 kg m¯² , has neck circumference > 40 cm, and/or high blood pressure the risk is greater (© American Sleep Apnea Association, 2006).

## 2020-10-16 NOTE — PATIENT INSTRUCTIONS

## 2020-10-16 NOTE — PROGRESS NOTES
Reanna Payan received a viral test for COVID-19. They were educated on isolation and quarantine as appropriate. For any symptoms, they were directed to seek care from their PCP, given contact information to establish with a doctor, directed to an urgent care or the emergency room.

## 2020-10-19 NOTE — RESULT ENCOUNTER NOTE

## 2020-10-21 NOTE — ANESTHESIA PRE PROCEDURE
Department of Anesthesiology  Preprocedure Note       Name:  Cristine Rivas   Age:  76 y.o.  :  1945                                          MRN:  [id number]         Date:  10/21/2020      Surgeon: Landry Camacho):  Matti Tafoya MD    Procedure: Procedure(s):  RIGHT TOTAL KNEE REPLACEMENT      [facility] [employer] [facility]    Medications prior to admission:   Prior to Admission medications    Medication Sig Start Date End Date Taking? Authorizing Provider   sildenafil (VIAGRA) 100 MG tablet TAKE ONE TABLET BY MOUTH AS NEEDED FOR ERECTILE DYSFUNCTION. ONLY TAKE ONCE IN 24 HOURS 8/10/20   Cortes Anne, DO   lisinopril-hydroCHLOROthiazide (PRINZIDE;ZESTORETIC) 10-12.5 MG per tablet TAKE ONE TABLET BY MOUTH DAILY FOR HIGH BLOOD PRESSURE 20   Cortes Camposico, DO   ibuprofen (ADVIL;MOTRIN) 200 MG tablet Take 200 mg by mouth as needed for Pain    Historical Provider, MD   aspirin 81 MG EC tablet Take 81 mg by mouth daily. Historical Provider, MD       Current medications:    No current facility-administered medications for this encounter. Current Outpatient Medications   Medication Sig Dispense Refill    sildenafil (VIAGRA) 100 MG tablet TAKE ONE TABLET BY MOUTH AS NEEDED FOR ERECTILE DYSFUNCTION. ONLY TAKE ONCE IN 24 HOURS 6 tablet 2    lisinopril-hydroCHLOROthiazide (PRINZIDE;ZESTORETIC) 10-12.5 MG per tablet TAKE ONE TABLET BY MOUTH DAILY FOR HIGH BLOOD PRESSURE 90 tablet 3    ibuprofen (ADVIL;MOTRIN) 200 MG tablet Take 200 mg by mouth as needed for Pain      aspirin 81 MG EC tablet Take 81 mg by mouth daily. Allergies:     Allergies   Allergen Reactions    Clarithromycin     Gabapentin     Pravastatin     Promethazine-Codeine     Vicodin [Hydrocodone-Acetaminophen]      Causes prostate swelling- needs to take terazosin if takes vicodin    Zegerid [Omeprazole]        Problem List:    Patient Active Problem List   Diagnosis Code    Left TKR Z96.659    HTN (hypertension) I10    GERD Encounters:   10/05/20 145 lb (65.8 kg)   07/27/20 145 lb (65.8 kg)   07/22/20 145 lb (65.8 kg)     Body mass index is 22.92 kg/m². CBC:   Lab Results   Component Value Date    WBC 5.1 10/07/2020    RBC 4.51 10/07/2020    HGB 14.3 10/07/2020    HCT 41.8 10/07/2020    MCV 92.9 10/07/2020    RDW 13.6 10/07/2020     10/07/2020       CMP:   Lab Results   Component Value Date     10/07/2020    K 3.8 10/07/2020    K 4.2 11/20/2018     10/07/2020    CO2 25 10/07/2020    BUN 16 10/07/2020    CREATININE 1.2 10/07/2020    GFRAA >60 10/07/2020    GFRAA >60 02/03/2013    AGRATIO 1.1 11/19/2018    LABGLOM 59 10/07/2020    GLUCOSE 129 10/07/2020    PROT 7.3 11/19/2018    CALCIUM 9.4 10/07/2020    BILITOT 0.6 11/19/2018    ALKPHOS 79 11/19/2018    AST 20 11/19/2018    ALT 7 11/19/2018       POC Tests: No results for input(s): POCGLU, POCNA, POCK, POCCL, POCBUN, POCHEMO, POCHCT in the last 72 hours.     Coags:   Lab Results   Component Value Date    PROTIME 11.4 10/07/2020    INR 0.98 10/07/2020    APTT 33.3 10/07/2020       HCG (If Applicable): No results found for: PREGTESTUR, PREGSERUM, HCG, HCGQUANT     ABGs: No results found for: PHART, PO2ART, OWU1CBT, XNC5EPC, BEART, D2QVSWLT     Type & Screen (If Applicable):  Lab Results   Component Value Date    LABABO O 01/22/2013    [facility] Positive 01/22/2013       Drug/Infectious Status (If Applicable):  No results found for: HIV, HEPCAB    COVID-19 Screening (If Applicable):   Lab Results   Component Value Date    COVID19 NOT DETECTED 10/16/2020         Anesthesia Evaluation  Patient summary reviewed and Nursing notes reviewed no history of anesthetic complications:   Airway: Mallampati: II     Neck ROM: full   Dental:          Pulmonary:Negative Pulmonary ROS and normal exam                               Cardiovascular:Negative CV ROS    (+) hypertension:, hyperlipidemia                  Neuro/Psych:   Negative Neuro/Psych ROS  (+) neuromuscular disease (ulnar neuropathy, radiculitis):,             GI/Hepatic/Renal: Neg GI/Hepatic/Renal ROS  (+) hiatal hernia, GERD: well controlled,           Endo/Other: Negative Endo/Other ROS   (+) : arthritis:., .                 Abdominal:           Vascular:                                      Anesthesia Plan      general     ASA 2     (I discussed with the patient the risks and benefits of regional anesthesia (inlcuding infection, bleeding, damage to nerves and surrounding structures)  PIV, ACNB, SAB, General anesthesia, IV Narcotics, PACU. All questions were answered the patient agrees with the plan and wishes to proceed.)  Induction: intravenous. Pre-Operative Diagnosis: Primary osteoarthritis of right knee [M17.11]    76 y.o.   BMI:  Body mass index is 23.73 kg/m².      Vitals:    10/15/20 1523 10/22/20 1056   BP:  (!) 151/82   Pulse:  61   Resp:  16   Temp:  97.9 °F (36.6 °C)   TempSrc:  Temporal   SpO2:  98%   Weight: 142 lb (64.4 kg) 147 lb (66.7 kg)   Height: 5' 6\" (1.676 m) 5' 6\" (1.676 m)       Allergies   Allergen Reactions    Clarithromycin     Gabapentin     Pravastatin     Promethazine-Codeine     Vicodin [Hydrocodone-Acetaminophen]      Causes prostate swelling- needs to take terazosin if takes vicodin    Zegerid [Omeprazole]        Social History     Tobacco Use    Smoking status: Former Smoker     Packs/day: 0.50     Years: 17.00     Pack years: 8.50     Last attempt to quit: 10/21/1976     Years since quittin.0    Smokeless tobacco: Never Used   Substance Use Topics    Alcohol use: No     Alcohol/week: 0.0 standard drinks       LABS:    CBC  Lab Results   Component Value Date/Time    WBC 5.1 10/07/2020 06:57 AM    HGB 14.3 10/07/2020 06:57 AM    HCT 41.8 10/07/2020 06:57 AM     10/07/2020 06:57 AM     RENAL  Lab Results   Component Value Date/Time     10/07/2020 06:57 AM    K 3.8 10/07/2020 06:57 AM    K 4.2 2018 07:30 AM     10/07/2020 06:57 AM    CO2 25 10/07/2020 06:57 AM    BUN 16 10/07/2020 06:57 AM    CREATININE 1.2 10/07/2020 06:57 AM    GLUCOSE 129 (H) 10/07/2020 06:57 AM     FAHAD  Lab Results   Component Value Date/Time    PROTIME 11.4 10/07/2020 06:57 AM    INR 0.98 10/07/2020 06:57 AM    APTT 33.3 10/07/2020 06:57 AM       Real Bridges MD   10/21/2020

## 2020-10-22 PROBLEM — Z96.651 STATUS POST TOTAL RIGHT KNEE REPLACEMENT: Status: ACTIVE | Noted: 2020-01-01

## 2020-10-22 PROBLEM — Z96.651 S/P TOTAL KNEE ARTHROPLASTY, RIGHT: Status: ACTIVE | Noted: 2020-01-01

## 2020-10-22 NOTE — H&P
I have reviewed the history and physical and examined the patient and find no relevant changes. I have reviewed with the patient and/or family the risks, benefits, and alternatives to the procedure. Patient being given increased dosage/quantity of opoid pain medication in excess of OSMB guidelines which noted a 30 MED daily of opioids due to the fact that he/she has undergone major orthopaedic surgery as outlined in rule 4731-11-13. Dosages and further duration of the pain medication will be re-evaluated at her post op visit in 2 weeks. Patient was educated on dosing expectations and limits of prescribing as a result of the new [facility] Board rules enacted August 31, 2017. Please also note that this is not the initial opoid prescription issued to this patient but a continuation of medication utilized during the hospital admission as noted in the medical record. [place]ARRS report has also been utilized to screen for any abuse history or suspicious activity as outlined in [place]. All efforts have been taken to prevent abuse potential and misuse of opioid medications including education, screening, and close clinical follow up. Controlled Substance Monitoring:    Acute and Chronic Pain Monitoring:   RX Monitoring 10/22/2020   Periodic Controlled Substance Monitoring No signs of potential drug abuse or diversion identified.

## 2020-10-22 NOTE — BRIEF OP NOTE
Brief Postoperative Note      Patient: Tomasz Benson  YOB: 1945  MRN: [id number]    Date of Procedure: 10/22/2020    Pre-Op Diagnosis: OSTEOARTHRITIS RIGHT KNEE    Post-Op Diagnosis: Same       Procedure(s):  RIGHT TOTAL KNEE REPLACEMENT      [facility]AYA [employer] NEPHLEW    Surgeon(s):  Jaida Loza MD    Assistant:  Surgical Assistant: Yosi Colvin; Guy Thomas; Wilber Iyer  Physician Assistant: KATT Ramos    Anesthesia: Spinal    Estimated Blood Loss (mL): 776     Complications: None    Specimens:   ID Type Source Tests Collected by Time Destination   A : RIGHT KNEE BONE Bone Bone SURGICAL PATHOLOGY Jaida Loza MD 10/22/2020 1331        Implants:  Implant Name Type Inv.  Item Serial No.  Lot No. LRB No. Used Action   IMPL KNEE INSRT ARTIC LEGION SZ 5-6 11MM Knee IMPL KNEE INSRT ARTIC LEGION SZ 5-6 11MM  Keyes [id number] Right 1 Implanted   IMPL KNEE PATELLA COMP RESURF 35MM Knee IMPL KNEE PATELLA COMP RESURF 35MM  Keyes [id number] Right 1 Implanted   IMPL KNEE TIB BASE NON PORS RT  5 Knee IMPL KNEE TIB BASE NON PORS RT  5  SMITH [id number] Right 1 Implanted   IMPL KNEE FEM COMP OXINIUM KATIA  5 Knee IMPL KNEE FEM COMP OXINIUM KATIA  5  SMITH [id number] Right 1 Implanted   CEMENT BONE RALLY HV Cement CEMENT BONE RALLY HV  Keyes [id number] Right 2 Implanted         Drains: * No LDAs found *    Findings: OA    Electronically signed by KATT Haji on 10/22/2020 at 2:07 PM

## 2020-10-22 NOTE — PROGRESS NOTES
4 Eyes Skin Assessment     The patient is being assess for   Post-Op Surgical    I agree that 2 RN's have performed a thorough Head to Toe Skin Assessment on the patient. ALL assessment sites listed below have been assessed. Areas assessed by both nurses:   [x]   Head, Face, and Ears   [x]   Shoulders, Back, and Chest, Abdomen  [x]   Arms, Elbows, and Hands   [x]   Coccyx, Sacrum, and Ischium  [x]   Legs, Feet, and Heels          **SHARE this note so that the co-signing nurse is able to place an eSignature**    Co-signer eSignature: Electronically signed by Brendan Overton RN on 10/22/20 at 10:48 PM EDT    Does the Patient have Skin Breakdown?   No          Gianni Prevention initiated:  No   Wound Care Orders initiated:  No      WOC nurse consulted for Pressure Injury (Stage 3,4, Unstageable, DTI, NWPT, Complex wounds)and New or Established Ostomies:  No      Primary Nurse eSignature: Electronically signed by Ulises Leigh RN on 10/22/20 at 6:54 PM EDT

## 2020-10-22 NOTE — ANESTHESIA PROCEDURE NOTES
Peripheral Block    Patient location during procedure: pre-op  Start time: 10/22/2020 11:55 AM  End time: 10/22/2020 11:56 AM  Staffing  Anesthesiologist: Laurel Holliday MD  Performed: anesthesiologist   Preanesthetic Checklist  Completed: patient identified, site marked, surgical consent, pre-op evaluation, timeout performed, IV checked, risks and benefits discussed, monitors and equipment checked, anesthesia consent given, oxygen available and patient being monitored  Peripheral Block  Patient position: supine  Prep: ChloraPrep  Patient monitoring: cardiac monitor, continuous pulse ox, frequent blood pressure checks and IV access  Block type: Femoral  Laterality: right  Injection technique: single-shot  Procedures: ultrasound guided  Local infiltration: lidocaine  Infiltration strength: 1 %  Dose: 3 mL  Adductor canal (Low Femoral)  Provider prep: mask and sterile gloves  Local infiltration: lidocaine  Needle  Needle gauge: 21 G  Needle length: 10 cm  Needle localization: ultrasound guidance  Assessment  Injection assessment: negative aspiration for heme, no paresthesia on injection and local visualized surrounding nerve on ultrasound  Paresthesia pain: none  Slow fractionated injection: yes  Hemodynamics: stable  Additional Notes  Immediately prior to procedure a \"time out\" was called to verify the correct patient, allergies, laterality, procedure and equipment. Time out performed with  RN    Local Anesthetic: 0.5 %  Bupivacaine   Amount: 20 ml  in 5 ml increments after negative aspiration each time. Femoral artery (Deep artery to the thigh take off), Femoral Vein and Femoral Nerve are identified; the tip of the need and the spread of the local anesthetic around the Femoral nerve are visualized. The Femoral nerve appeared to be anatomically normal and there were no abnormal pathologically findings seen.      Versed 2mg    Reason for block: post-op pain management and at surgeon's request

## 2020-10-22 NOTE — PROGRESS NOTES
Pt brought to PACU. Report obtained from OR RN and anesthesia. Pt placed on monitor and O2 at 2L per NC. Pt not yet awake. Dressing to left leg CDI with ice and immobilizer in place.  Beata Riojas

## 2020-10-22 NOTE — ANESTHESIA PROCEDURE NOTES
Peripheral Block    Patient location during procedure: pre-op  Start time: 10/22/2020 11:51 AM  End time: 10/22/2020 11:52 AM  Staffing  Anesthesiologist: Medina Barba MD  Performed: anesthesiologist   Preanesthetic Checklist  Completed: patient identified, site marked, surgical consent, pre-op evaluation, timeout performed, IV checked, risks and benefits discussed, monitors and equipment checked, anesthesia consent given, oxygen available and patient being monitored  Peripheral Block  Patient position: supine  Prep: ChloraPrep  Patient monitoring: cardiac monitor, continuous pulse ox, frequent blood pressure checks and IV access  Block type: iPacks  Laterality: right  Injection technique: single-shot  Procedures: ultrasound guided  Local infiltration: lidocaine  Infiltration strength: 1 %  Dose: 3 mL  Provider prep: mask and sterile gloves  Local infiltration: lidocaine  Needle  Needle gauge: 21 G  Needle length: 10 cm  Needle localization: ultrasound guidance  Assessment  Injection assessment: negative aspiration for heme, no paresthesia on injection and local visualized surrounding nerve on ultrasound  Paresthesia pain: none  Slow fractionated injection: yes  Hemodynamics: stable  Additional Notes  Immediately prior to procedure a \"time out\" was called to verify the correct patient, allergies, laterality, procedure and equipment. Time out performed with  RN    Local Anesthetic: 0.5 %  Bupivacaine   Amount: 10 ml  in 5 ml increments after negative aspiration each time.     Versed 2mg    Reason for block: post-op pain management and at surgeon's request

## 2020-10-22 NOTE — ANESTHESIA PROCEDURE NOTES
Spinal Block    Patient location during procedure: OR  Start time: 10/22/2020 12:15 PM  End time: 10/22/2020 12:21 PM  Reason for block: primary anesthetic  Staffing  Anesthesiologist: Alexus Guevara MD  Resident/CRNA: SHEREEN Luna - CRNA  Performed: resident/CRNA   Preanesthetic Checklist  Completed: patient identified, site marked, surgical consent, pre-op evaluation, timeout performed, IV checked, risks and benefits discussed, monitors and equipment checked, anesthesia consent given, oxygen available and patient being monitored  Spinal Block  Patient position: sitting  Prep: ChloraPrep  Patient monitoring: continuous pulse ox and frequent blood pressure checks  Approach: midline  Location: L3/L4  Provider prep: mask and sterile gloves  Local infiltration: lidocaine  Agent: bupivacaine  Dose: 2  Dose: 2  Needle  Needle type: Pencan   Needle gauge: 24 G  Needle length: 3.5 in  Assessment  Sensory level: T6  Swirl obtained: Yes  CSF: clear  Attempts: 1  Hemodynamics: stable

## 2020-10-23 NOTE — PROGRESS NOTES
Physical Therapy  Facility/Department: [facility] C5 - MED SURG/ORTHO  Daily Treatment Note  NAME: Tomasz Benson  : 1945  MRN: [id number]    Date of Service: 10/23/2020    Discharge Recommendations:  Home with assist PRN, Home with Home health PT   PT Equipment Recommendations  Equipment Needed: No  Other: patient already has a walker. If pt is unable to be seen after this session, please let this note serve as discharge summary. Please see case management note for discharge disposition. Thank you. Assessment   Body structures, Functions, Activity limitations: Decreased functional mobility ; Decreased ROM; Decreased strength; Increased pain  Assessment: Patient is a 76year old male POD 1, R TKR, WBAT in immobilizer. Since the AM session, a rapid response/code was called due to patient had an apparent syncopal episode while sitting in the chair. During the PM session the patient's vitals were stable and had no orthostatic symptoms. Patient's RN was made aware. During the PM session the patient again was modified indep for bed mob and transfers. He needed SBA to ambulate 150 feet with SW.  Patient again voiced understanding of all instructions. Patient is safe for home, when medically stable, with continued PRN assist/supervision. Patient already has a standard walker at home. Treatment Diagnosis: decreased ROM and mobility post TKR  Prognosis: Good  Decision Making: Low Complexity  Barriers to Learning: none  REQUIRES PT FOLLOW UP: Yes  Activity Tolerance  Activity Tolerance: Patient Tolerated treatment well  Activity Tolerance: Vitals: 144/69 64 % Seated:  145/70 63 BPM Standin/67 65 BPM. Post activity: 147/67 76 BPM     Patient Diagnosis(es): The encounter diagnosis was Primary osteoarthritis of right knee. has a past medical history of Arthritis, GERD (gastroesophageal reflux disease), Hiatal hernia, and Hypertension.    has a past surgical history that includes Knee arthroscopy; Vasectomy; joint replacement (Left, 01/31/13); Colonoscopy (10/29/2013); Upper gastrointestinal endoscopy; Upper gastrointestinal endoscopy (2015); hernia repair (02/19/2015); Gastric fundoplication (feb 5095); Forearm surgery (Left, 10/04/2017); and Total knee arthroplasty (Right, 10/22/2020). Restrictions  Restrictions/Precautions  Restrictions/Precautions: Weight Bearing, General Precautions, Up as Tolerated, Fall Risk  Required Braces or Orthoses?: Yes  Lower Extremity Weight Bearing Restrictions  Right Lower Extremity Weight Bearing: Weight Bearing As Tolerated  Required Braces or Orthoses  Right Lower Extremity Brace: Knee Immobilizer  Subjective   General  Chart Reviewed: Yes  Family / Caregiver Present: No  Referring Practitioner: KATT Peña for Dr. Tess Hunter  Subjective  Subjective: Patient agreed to participate. General Comment  Comments: RN cleared pt for therapy, pt in bed on approach  Pain Screening  Patient Currently in Pain: Yes  Pain Assessment  Pain Assessment: 0-10  Pain Level: 2  Pain Type: Surgical pain  Pain Location: Knee  Pain Orientation: Right  Non-Pharmaceutical Pain Intervention(s): Ambulation/Increased Activity; Therapeutic presence  Response to Pain Intervention: Patient Satisfied  Vital Signs  Patient Currently in Pain: Yes       Orientation  Orientation  Overall Orientation Status: Within Normal Limits  Cognition   Cognition  Overall Cognitive Status: WFL  Objective   Bed mobility  Supine to Sit: Modified independent  Sit to Supine: Modified independent  Transfers  Sit to Stand: Modified independent  Stand to sit: Modified independent  Bed to Chair: Modified independent  Ambulation  Ambulation?: Yes  WB Status: WBAT  Ambulation 1  Surface: level tile  Device: Standard Walker  Assistance: Stand by assistance  Quality of Gait: antalgic gait pattern. decreased gait velocity, step length, step height. occasionally needed cueing for proper placement of standard walker.   Gait Deviations: Slow Ara;Decreased step length;Decreased step height  Distance: 150 ft  Comments: No complaints of shortness of breath, chest pain or dizziness. Stairs/Curb  Stairs?: No        Exercises  Straight Leg Raise: 1 x 5 AROM  Quad Sets: 1 x 10 bilateral  Heelslides: 2 x 15 while seated edge of bed with wash cloth under foot. Knee Passive Range of Motion: 0 to 90 through bulky bandages  Ankle Pumps: 1 x 10  Comments: cueing for sequencing and technique. Other exercises  Other exercises?: No                        AM-PAC Score     AM-PAC Inpatient Mobility without Stair Climbing Raw Score : 19 (10/23/20 1730)  AM-PAC Inpatient without Stair Climbing T-Scale Score : 56.04 (10/23/20 1730)  Mobility Inpatient CMS 0-100% Score: 12.25 (10/23/20 1730)  Mobility Inpatient without Stair CMS G-Code Modifier : CI (10/23/20 1730)       Goals  Short term goals  Time Frame for Short term goals: 10/25/20  Short term goal 1: pt to perform indep bed mob- MET  Short term goal 2: pt to perform modified indep transfers- MET  Short term goal 3: pt to amb with  ft supervised- MET 10/23 100 feet with SBA  Short term goal 4: pt to negotiate 3 stairs with rails supervised- MET  Short term goal 5: pt to perform at least 10 reps LE Ex per HEP- MET  Patient Goals   Patient goals : \" to know my HEP and how to use immobilizer\" - MET \"To walk houshold distances (~150 feet) with my walker and with supervision of my family. \" Goal met on 10/23/20    Plan    Plan  Times per week: BID 7 days wk ( anticipate discharge today)  Times per day: Twice a day  Current Treatment Recommendations: Strengthening, Functional Mobility Training, Transfer Training, Gait Training, Stair training, Safety Education & Training, Home Exercise Program  Safety Devices  Type of devices:  All fall risk precautions in place, Bed alarm in place, Call light within reach, Gait belt, Patient at risk for falls, Nurse notified     Therapy Time   Individual Concurrent Group Co-treatment   Time In 1600         Time Out 1623         Minutes 23         Timed Code Treatment Minutes: [facility], PT

## 2020-10-23 NOTE — CARE COORDINATION
DCP met with Milly Correia NP Re: d/c planning . Pt has all DME,. Met with pt at bedside to discuss [facility] [place], [facility] pt is in agreement with no agency preference. OK for referral to [facility]. Spoke to Sotero Soto with [facility]. No further needs identified by DCP. Please contact should further needs arise.  Lana Tan RN

## 2020-10-23 NOTE — PROGRESS NOTES
Patient called out to nurse saying he felt \"dizzy\" sitting in the chair. Nursing staff went into room immediatley to assist patient back to bed. Patient was talking at first then became unresponsive with a palpable pulse; sitting in the chair. Patient was placed back in bed x 4 staff members. Vickey English CNP was in at bedside who assessed patient. Patient started to rouse and was AOx4 with complaints of nausea. IV Zofran was administered and patient had x1 emesis. Cold cloth was placed on patients head for comfort. Patient states \"I am feeling better. \" Nursing will continue to monitor the patient.

## 2020-10-23 NOTE — OP NOTE
[facility] [address], [place] [address]                                OPERATIVE REPORT    PATIENT NAME: Acosta Gagnon                    :        1945  MED REC NO:   [id number]                          ROOM:       5100  ACCOUNT NO:   [de-identified]                           ADMIT DATE: 10/22/2020  PROVIDER:     Marbin Byrne MD    DATE OF PROCEDURE:  10/22/2020    SERVICE:  Orthopedic Surgery. SURGEON:  Robles Moon MD    FIRST ASSISTANT:  KATT Silver    PREOPERATIVE DIAGNOSIS:  Severe osteoarthritis of the right knee,  715. 36. POSTOPERATIVE DIAGNOSIS:  Severe osteoarthritis of the right knee,  715.36. OPERATIVE PROCEDURE:  Right Smith and Nephew total knee replacement -  [facility] size 5 Oxinium femoral component, size 5 tibial component, 11-mm  deep flexion XLPE polyethylene insert, and 35-mm onlay patella. TOURNIQUET TIME:  350 mmHg for 11 minutes. The tourniquet was utilized  only for bone prep and cementing. DRAINS:  None. COMPLICATIONS:  None. X-RAYS:  None. ANTIBIOTICS:  As per SCIP protocol, based upon patient's age, weight,  and allergies. INSTRUMENT COUNT:  Correct x2. ESTIMATED BLOOD LOSS:  150 mL. DISPOSITION:  To the recovery room. X-rays in the recovery room ordered  of the operative knee. INDICATIONS FOR SURGERY:  The patient is 76years old. The  above-mentioned patient presents for elective total knee replacement  arthroplasty on the date of surgery. The history is that the patient  has well-outlined records from [facility]. The patient has  failed nonoperative measures with respect to control of patient's pain  and function. There has been an extensive discussion regarding the  risks, benefits, and potential complications of this procedure, as well  as normal rehabilitative protocol.   The patient specifically voiced  understanding to concerns with respect to surgical infection, deep vein  thrombosis, dystrophy, arthrofibrosis, delayed rehabilitation potential,  need for manipulation, periprosthetic fractures, neurologic injury, etc.  The patient voiced understanding to all of this and elected to have the  procedure done. All questions were answered. DETAILS OF SURGERY:  The patient was seen in the preanesthesia holding  area, and I personally initialed the operative site and answered any  further questions. They were then taken to the operating room, where  anesthesia was induced and maintained. This was well documented in  their records from the hospital with respect to the anesthesia personnel  and the type of anesthesia which they performed, including nerve blocks. The patient's leg was then positioned, prepped and draped in the usual  fashion for total knee replacement arthroplasty. ChloraPrep was  utilized as the preparation. The right leg was prepped and draped, and  Ioban drape was applied after the surgical incision was marked. A  longitudinal incision was made and then carried down through the skin  and soft tissues after inflation of the pneumatic tourniquet at 350  mmHg. A medial arthrotomy was performed with an incision passing just  along the most medial side of the extensor mechanism and the vastus  medialis obliquus. The patella was then retracted laterally. At this  point, careful attention was made to removal of osteophytes and soft  tissue balancing, with removal of any adhesions, particularly around the  lateral peripatellar region. Utilizing the [employer] protocol for the above-mentioned  prosthesis, the course of femoral and tibial cuts were made. Femoral  cuts were addressed first.  The distal cut first protocol was used with  the patient ultimately being sized for the aforementioned trial.  This  gave excellent fixation both in the anteroposterior and mediolateral  dimensions.     Attention was then drawn toward the tibia. The preliminary cut was made  in the tibia, removing approximately 2 mm of bone based upon the  patient's overall tibial anatomy. The trial tibia was sized and placed  in the aforementioned thickness and size as mentioned above. This gave  excellent position and range of motion after trial components were  utilized. Any further tissue balancing that was necessary was performed  at this juncture. The patella was addressed. Approximately 10 mm of bone was removed from  the patella using an oscillating saw. The onlay patellar component was  sized and appropriately positioned. The knee was then taken through a  trial range of motion with excellent patellar tracking. At this point, copious irrigation was performed with the pulsatile  lavage, and the bone was prepped for cement. The permanent components  were then cemented into position. After the cement hardened, the knee  was again taken through a range of motion and gave the same excellent  alignment and stability. The permanent polyethylene prosthesis was applied to the tibia. The  pneumatic tourniquet was deflated at this point, and hemostasis was  obtained with Bovie electrocauterization. The wound was then closed in  a layered fashion. The patient was stable and taken to the recovery room with a knee  immobilization device in place.         Katia García MD    D: 10/22/2020 16:04:02       T: 10/22/2020 23:27:12     [email or web address]  Job#: [id number]     Doc#: [id number]    CC:

## 2020-10-23 NOTE — PROGRESS NOTES
Department of Orthopedic Surgery  Nurse Practitioner   Progress Note    Subjective:     Post-Operative Day: 1 Status Post right Total Knee Arthroplasty  Systemic or Specific Complaints: Patient initially seen resting in bed after staff assist called due to syncopal episode in the chair. Patient then seen later resting in bed, feeling much improved. Wife at bedside second time. Objective:     Patient Vitals for the past 24 hrs:   BP Temp Temp src Pulse Resp SpO2   10/23/20 1003 116/75 98 °F (36.7 °C) Oral 87 18 --   10/23/20 0958 (!) 144/74 -- -- -- -- --   10/22/20 2330 (!) 149/67 98 °F (36.7 °C) Oral 65 16 98 %   10/22/20 1932 (!) 153/79 97.4 °F (36.3 °C) Axillary 74 14 100 %   10/22/20 1355 -- 97.3 °F (36.3 °C) Temporal -- -- --       General: alert, appears stated age, cooperative and no distress   Wound: Wound clean and dry no evidence of infection. Motion: Painful range of Motion   DVT Exam: No evidence of DVT seen on physical exam.       Knee swollen but thigh soft to palpation. Moving foot and ankle. Good distal pulses. Data Review  CBC:   Lab Results   Component Value Date    WBC 10.8 10/23/2020    RBC 3.57 10/23/2020    HGB 11.4 10/23/2020    HCT 33.0 10/23/2020     10/23/2020       Assessment:     Status Post right Total Knee Arthroplasty. Doing well postoperatively. Plan:      1: Continues current post-op course : Continue current plan of care. Medicine following. Post-op labs reviewed and stable. Hospitalist discontinued BP meds. Syncopal episode likely vasovagal; pt reports history. 2:  Continue Deep venous thrombosis prophylaxis - Eliquis  3:  Continue physical therapy, OT, WBAT  4:  Continue Pain Control  5:  Plan for home with Alysa Vigil at discharge, pending improvement in BP and progress with therapy, likely tomorrow. Pt has a walker at home. Med rec, scripts, discharge instructions and JAYNE not yet completed due to vagal episode.     Sujatha Puentes, CNP

## 2020-10-23 NOTE — PROGRESS NOTES
Physical Therapy    Facility/Department: [place] [address] - MED SURG/ORTHO  Initial Assessment    NAME: Marky Valencia  : 1945  MRN: [id number]    Date of Service: 10/23/2020    Discharge Recommendations:  Home with assist PRN, Home with Home health PT      If pt discharges prior to next PT session this note will serve as discharge summary. Assessment   Body structures, Functions, Activity limitations: Decreased functional mobility ; Decreased ROM; Decreased strength; Increased pain  Assessment: 77 yo male POD 1, R TKR, WBAT in immobilizer. Pt was able to perform 10-15 reps LE HEP. Pain 2-3/10. Pt was modified indep for bed mob and transfers, supervised for amb with  ft and to negotiate 3 six inch steps with rails. Pt voices understanding of all instructions, Handouts provided. Pt will benefit from skilled PT post discharge to address post op deficits. Recommend home assist prn and home PT. No equip needs  Treatment Diagnosis: decreased ROM and mobility post TKR  Prognosis: Good  Decision Making: Low Complexity  PT Education: Goals; General Safety;Gait Training;PT Role;Disease Specific Education;Plan of Care; Functional Mobility Training;Home Exercise Program;Transfer Training;Weight-bearing Education  Patient Education: Pt educated in compensatory gait pattern for level surface and stairs post TKR, handouts provided for HEP, instructed in use of immoillizer  Barriers to Learning: none  REQUIRES PT FOLLOW UP: Yes  Activity Tolerance  Activity Tolerance: Patient Tolerated treatment well       Patient Diagnosis(es): The encounter diagnosis was Primary osteoarthritis of right knee. has a past medical history of Arthritis, GERD (gastroesophageal reflux disease), Hiatal hernia, and Hypertension. has a past surgical history that includes Knee arthroscopy; Vasectomy; joint replacement (Left, 13); Colonoscopy (10/29/2013); Upper gastrointestinal endoscopy;  Upper gastrointestinal endoscopy (); hernia Time In 0755         Time Out 0828         Minutes 33         Timed Code Treatment Minutes: [address] [facility], PT

## 2020-10-23 NOTE — PROGRESS NOTES
Occupational Therapy   Occupational Therapy Initial/discharge Assessment  1 x only  Date: 10/23/2020   Patient Name: Blanca Ramos  MRN: [id number]     : 1945    Date of Service: 10/23/2020    Discharge Recommendations:  Home with assist PRN       Assessment      OT Education: OT Role;IADL Safety  Patient Education: disease specific:  use of nurse call light for assist while in hospital, importance of safety with mobility with std. walker  No Skilled OT: Safe to return home  REQUIRES OT FOLLOW UP: No  Activity Tolerance  Activity Tolerance: Patient Tolerated treatment well  Safety Devices  Safety Devices in place: Yes  Type of devices: Call light within reach; Left in chair;Chair alarm in place;Nurse notified           Patient Diagnosis(es): The encounter diagnosis was Primary osteoarthritis of right knee. has a past medical history of Arthritis, GERD (gastroesophageal reflux disease), Hiatal hernia, and Hypertension. has a past surgical history that includes Knee arthroscopy; Vasectomy; joint replacement (Left, 13); Colonoscopy (10/29/2013); Upper gastrointestinal endoscopy; Upper gastrointestinal endoscopy (); hernia repair (2015); Gastric fundoplication (5202); and Forearm surgery (Left, 10/04/2017).            Restrictions  Restrictions/Precautions  Restrictions/Precautions: Weight Bearing, General Precautions, Up as Tolerated, Fall Risk  Required Braces or Orthoses?: Yes  Lower Extremity Weight Bearing Restrictions  Right Lower Extremity Weight Bearing: Weight Bearing As Tolerated  Required Braces or Orthoses  Right Lower Extremity Brace: Knee Immobilizer    Subjective   General  Chart Reviewed: Yes  Patient assessed for rehabilitation services?: Yes  Family / Caregiver Present: No  Referring Practitioner: Dr. Cleveland Leon  Diagnosis: OA Right knee; s/p Right TKR 10-22-20  Patient Currently in Pain: Yes  Pain Assessment  Pain Assessment: 0-10  Pain Level: 3  Pain Type: Surgical pain  Pain Location: Knee  Pain Orientation: Right  Pain Descriptors: Discomfort  Pain Frequency: Continuous  Pain Onset: On-going  Clinical Progression: Not changed  Functional Pain Assessment: Activities are not prevented  Non-Pharmaceutical Pain Intervention(s): Emotional support; Ambulation/Increased Activity  Response to Pain Intervention: Patient Satisfied  Vital Signs  Patient Currently in Pain: Yes  Social/Functional History  Social/Functional History  Lives With: Spouse  Type of Home: House  Home Layout: Two level, Able to Live on Main level with bedroom/bathroom  Home Access: Stairs to enter without rails(2)  Bathroom Shower/Tub: Tub/Shower unit  Bathroom Toilet: Standard  Bathroom Equipment: Grab bars in shower  Home Equipment: Standard walker, Cane  ADL Assistance: Independent  Ambulation Assistance: Independent  Transfer Assistance: Independent       Objective        Orientation  Overall Orientation Status: Within Functional Limits     Balance  Sitting Balance: Supervision  Standing Balance: Stand by assistance(with std. walker)  Standing Balance  Time: 3 minutes x 2  Activity: functional mobility  Functional Mobility  Functional - Mobility Device: Standard Walker  Activity: To/From therapy gym  Assist Level: Stand by assistance  ADL  Feeding: Independent  Grooming: Independent  LE Dressing: Supervision  Toileting: Stand by assistance        Bed mobility  Sit to Supine: Unable to assess(Left up in chair for breakfast & \"Drop & Dangle\")  Transfers  Sit to stand: Stand by assistance  Stand to sit: Supervision  Vision - Basic Assessment  Prior Vision: Wears glasses all the time  Cognition  Overall Cognitive Status: [facility]              Plan   Plan  Plan Comment: OT eval & transfer instruction             AM-PAC Score        AM-PAC Inpatient Daily Activity Raw Score: 22 (10/23/20 0846)  AM-PAC Inpatient ADL T-Scale Score : 47.1 (10/23/20 0846)  ADL Inpatient CMS 0-100% Score: 25.8 (10/23/20 0846)  ADL Inpatient CMS G-Code Modifier : Kashif Piña (10/23/20 6681)    Goals  Short term goals  Time Frame for Short term goals: 1 visit  Short term goal 1: verbalize/demonstrate good understanding of car transfer: STG met 10-23-20  Patient Goals   Patient goals : get around better       Therapy Time   Individual Concurrent Group Co-treatment   Time In 0825         Time Out Luige Bharathi 10         Minutes [address] [place], [place]

## 2020-10-23 NOTE — CONSULTS
Hospital Medicine  Consult History & Physical        Chief Complaint:  Right knee pain    Date of Service: Pt seen/examined in consultation on 10/23/2020    History Of Present Illness:      76 y.o. male who we are asked to see/evaluate by Saritha Cassidy MD for medical management of HTN and syncope. The patient reports a several year hx of progressive right knee pain 2nd to OA that is rated 10/10, worse with ambulation and relieved to some degree by rest and analgesic medication. This has progressed to the point that the analgesic meds are minimally effective and the patient decided to undergo an elective total right arthroplasty. The patient had a rapid response called this AM for a syncopal episode while OOB in the chair. He denies fever, chills, CP, SOB, abdominal pain. He does feel a little nauseaous following the syncopal episode. Past Medical History:        Diagnosis Date    Arthritis     GERD (gastroesophageal reflux disease)     resolved with surgery    Hiatal hernia     Hypertension        Past Surgical History:        Procedure Laterality Date    COLONOSCOPY  10/29/2013    FOREARM SURGERY Left 10/04/2017    excision left forearm mass    GASTRIC FUNDOPLICATION  feb 3825    HERNIA REPAIR  02/19/2015    ROBOTIC ASSISTED HIATAL HERNIA REPAIR WITH NISSEN    JOINT REPLACEMENT Left 01/31/13    left total knee replacement    KNEE ARTHROSCOPY      left    TOTAL KNEE ARTHROPLASTY Right 10/22/2020    RIGHT TOTAL KNEE REPLACEMENT      CELAYA & NEPHEW performed by Saritha Cassidy MD at [address] [place] [address] ENDOSCOPY  2015    bx    VASECTOMY         Medications Prior to Admission:    Prior to Admission medications    Medication Sig Start Date End Date Taking?  Authorizing Provider   lisinopril-hydroCHLOROthiazide (PRINZIDE;ZESTORETIC) 10-12.5 MG per tablet TAKE ONE TABLET BY MOUTH DAILY FOR HIGH BLOOD PRESSURE 4/1/20  Yes Andreina Morales, DO sildenafil (VIAGRA) 100 MG tablet TAKE ONE TABLET BY MOUTH AS NEEDED FOR ERECTILE DYSFUNCTION. ONLY TAKE ONCE IN 24 HOURS 8/10/20   Patricio Puente DO   ibuprofen (ADVIL;MOTRIN) 200 MG tablet Take 200 mg by mouth as needed for Pain    Historical Provider, MD   aspirin 81 MG EC tablet Take 81 mg by mouth daily. Historical Provider, MD       Allergies:  Clarithromycin; Gabapentin; Pravastatin; Promethazine-codeine; Vicodin [hydrocodone-acetaminophen]; and Zegerid [omeprazole]    Social History:      The patient currently lives with his wife. TOBACCO:   reports that he quit smoking about 44 years ago. He has a 8.50 pack-year smoking history. He has never used smokeless tobacco.  ETOH:   reports no history of alcohol use. Family History:     Reviewed in detail. Positive as follows:        Problem Relation Age of Onset    High Blood Pressure Mother     High Blood Pressure Father     Cancer Father         lung    High Blood Pressure Sister     High Blood Pressure Brother        REVIEW OF SYSTEMS:   Pertinent positives as noted in the HPI. All other systems reviewed and negative. PHYSICAL EXAM PERFORMED:  /77   Pulse 54   Temp 97.8 °F (36.6 °C) (Oral)   Resp 17   Ht 5' 6\" (1.676 m)   Wt 147 lb (66.7 kg)   SpO2 99%   BMI 23.73 kg/m²   General appearance: No apparent distress, appears stated age and cooperative. HEENT: Normal cephalic, atraumatic without obvious deformity. Pupils equal, round, and reactive to light. Extra ocular muscles intact. Conjunctivae/corneas clear. Neck: Supple, with full range of motion. No jugular venous distention. Trachea midline. Respiratory:  Normal respiratory effort. Clear to auscultation, bilaterally without Rales/Wheezes/Rhonchi. Cardiovascular: Regular rate and rhythm with normal S1/S2 without murmurs, rubs or gallops. Abdomen: Soft, non-tender, non-distended with normal bowel sounds. Musculoskeletal: Decreased ROM RLE.  No clubbing, cyanosis or edema bilaterally. Skin: Skin color, texture, turgor normal.  No rashes or lesions. Neurologic:  Neurovascularly intact without any focal sensory/motor deficits. Cranial nerves: II-XII intact, grossly non-focal.  Psychiatric: Alert and oriented, thought content appropriate, normal insight  Capillary Refill: Brisk,< 3 seconds   Peripheral Pulses: +2 palpable, equal bilaterally     Labs:     Recent Labs     10/23/20  0802   WBC 10.8   HGB 11.4*   HCT 33.0*        Recent Labs     10/23/20  0802      K 3.9      CO2 25   BUN 14   CREATININE 1.0   CALCIUM 8.3     No results for input(s): AST, ALT, BILIDIR, BILITOT, ALKPHOS in the last 72 hours. No results for input(s): INR in the last 72 hours. No results for input(s): [place] in the last 72 hours. Urinalysis:    Lab Results   Component Value Date    NITRU Negative 10/07/2020    BLOODU Negative 10/07/2020    SPECGRAV 1.015 10/07/2020    GLUCOSEU Negative 10/07/2020       Radiology: I have reviewed the radiology reports with the following interpretation:     XR KNEE RIGHT (1-2 VIEWS)   Final Result   Expected post surgical appearance. No evidence of complication. ASSESSMENT:    Active Hospital Problems    Diagnosis Date Noted    Status post total right knee replacement [Z96.651] 10/22/2020    S/P total knee arthroplasty, right [Z96.651] 10/22/2020       PLAN:    Right knee OA - s/p elective right total knee arthroplasty 10/22.  - management per Ortho. - follow CBC.  - PT/OT    Syncopal episode - occurred after being OOB to chair. Likely vasovagal due to pain and nausea. Complained of dizziness prior to episode. - monitor on tele. - parameters placed on BP meds. HTN - continue home meds with hold parameters. Anemia, expected due to surgical losses. - follow CBC. - transfusion threshold Hgb < 7.     DVT Prophylaxis: Eliquis  Diet: DIET GENERAL;  Code Status: Full Code    PT/OT Eval Status: Active and

## 2020-10-23 NOTE — CARE COORDINATION
[facility]    Referral received from  to follow for home care services. I will follow for needs, and speak with patient to verify demos.     Nanci Puente RN, BSN CTN  [facility] [phone]

## 2020-10-23 NOTE — CODE DOCUMENTATION
Primary RN stated they called a code blue d/t thready pulse and needing help getting patient back into bed.

## 2020-10-23 NOTE — ANESTHESIA POSTPROCEDURE EVALUATION
Department of Anesthesiology  Postprocedure Note    Patient: Blanca Ramos  MRN: [id number]  YOB: 1945  Date of evaluation: 10/23/2020  Time:  12:14 PM     Procedure Summary     Date:  10/22/20 Room / Location:  [facility]    Anesthesia Start:  1213 Anesthesia Stop:  8525    Procedure:  RIGHT TOTAL KNEE REPLACEMENT      CELAYA & NEPHEW (Right Knee) Diagnosis:       Primary osteoarthritis of right knee      (OSTEOARTHRITIS RIGHT KNEE)    Surgeon:  Aisha Wilson MD Responsible Provider:  Nic Rodas MD    Anesthesia Type:  general ASA Status:  2          Anesthesia Type: general    Angela Phase I: Angela Score: 7    Angela Phase II:      Last vitals: Reviewed and per EMR flowsheets.        Anesthesia Post Evaluation    Comments: Postoperative Anesthesia Note    Name:    Blanca Ramos  MRN:      [id number]    Patient Vitals in the past 12 hrs:  10/23/20 1133, BP:135/71, Temp:97.8 °F (36.6 °C), Temp src:Oral, Pulse:54, Resp:18, SpO2:100 %  10/23/20 1003, BP:116/75, Temp:98 °F (36.7 °C), Temp src:Oral, Pulse:87, Resp:18  10/23/20 0958, BP:(!) 144/74  10/23/20 0852, BP:117/72, Temp:98.2 °F (36.8 °C), Temp src:Oral, Pulse:71, Resp:18     LABS:    CBC  Lab Results       Component                Value               Date/Time                  WBC                      10.8                10/23/2020 08:02 AM        HGB                      11.4 (L)            10/23/2020 08:02 AM        HCT                      33.0 (L)            10/23/2020 08:02 AM        PLT                      159                 10/23/2020 08:02 AM   RENAL  Lab Results       Component                Value               Date/Time                  NA                       137                 10/23/2020 08:02 AM        K                        3.9                 10/23/2020 08:02 AM        CL                       102                 10/23/2020 08:02 AM        CO2                      25 10/23/2020 08:02 AM        BUN                      14                  10/23/2020 08:02 AM        CREATININE               1.0                 10/23/2020 08:02 AM        GLUCOSE                  130 (H)             10/23/2020 08:02 AM   COAGS  Lab Results       Component                Value               Date/Time                  PROTIME                  11.4                10/07/2020 06:57 AM        INR                      0.98                10/07/2020 06:57 AM        APTT                     33.3                10/07/2020 06:57 AM     Intake & Output:  @01YOAD@    Nausea & Vomiting:  No    Level of Consciousness:  Awake    Pain Assessment:  Adequate analgesia    Anesthesia Complications:  No apparent anesthetic complications    SUMMARY      Vital signs stable  OK to discharge from Stage I post anesthesia care.   Care transferred from Anesthesiology department on discharge from perioperative area

## 2020-10-24 NOTE — PROGRESS NOTES
of Arthritis, GERD (gastroesophageal reflux disease), Hiatal hernia, and Hypertension. has a past surgical history that includes Knee arthroscopy; Vasectomy; joint replacement (Left, 01/31/13); Colonoscopy (10/29/2013); Upper gastrointestinal endoscopy; Upper gastrointestinal endoscopy (2015); hernia repair (02/19/2015); Gastric fundoplication (feb 7716); Forearm surgery (Left, 10/04/2017); and Total knee arthroplasty (Right, 10/22/2020). Restrictions  Restrictions/Precautions  Restrictions/Precautions: Weight Bearing, General Precautions, Up as Tolerated, Fall Risk  Required Braces or Orthoses?: Yes  Lower Extremity Weight Bearing Restrictions  Right Lower Extremity Weight Bearing: Weight Bearing As Tolerated  Required Braces or Orthoses  Right Lower Extremity Brace: Knee Immobilizer     Subjective   General  Chart Reviewed: Yes  Family / Caregiver Present: No  Subjective  Subjective: Patient agreed to participate. General Comment  Comments: RN cleared pt for therapy, pt in bed on approach  Pain Screening  Patient Currently in Pain: Yes  Pain Assessment  Pain Assessment: 0-10  Pain Level: 2  Pain Type: Acute pain;Surgical pain  Pain Location: Knee  Pain Orientation: Right  Pain Frequency: Continuous  Clinical Progression: Not changed  Functional Pain Assessment: Activities are not prevented  Vital Signs  Patient Currently in Pain: Yes  Pre Treatment Pain Screening  Intervention List: Patient able to continue with treatment         Objective   Bed mobility  Supine to Sit: Modified independent  Sit to Supine: Unable to assess(pt up to chair to end session)     Transfers  Sit to Stand: Modified independent  Stand to sit: Modified independent     Ambulation  Ambulation?: Yes  WB Status: WBAT  More Ambulation?: Yes  Ambulation 1  Surface: level tile  Device: Standard Walker  Assistance: Supervision  Quality of Gait: Mildly antalgic gait pattern, step to pattern, decreased velocity. Good management of walker.   Gait Deviations: Slow Ara;Decreased step length;Decreased step height  Distance: 150 ft  Comments: No complaints of shortness of breath, chest pain or dizziness. Ambulation 2  Surface - 2: level tile  Device 2: No device  Assistance 2: Contact guard assistance  Quality of Gait 2: Partial step-through pattern with significantly reduced step length vs. with walker. No overt LOB. Gait Deviations: Slow Ara;Decreased step length;Decreased step height  Stairs/Curb  Stairs?: Yes  Stairs  # Steps : 3  Stairs Height: 6\"  Rails: Bilateral  Device: No Device  Assistance: Supervision  Comment: Pt asc/desc stairs with non-reciprocal pattern, good control and balance, mild use of UEs for balance.      Balance  Sitting - Static: Good  Sitting - Dynamic: Good  Standing - Static: Good;-  Standing - Dynamic: Good;-     Exercises  Straight Leg Raise: 1 x 10 RLE  Quad Sets: 1 x 10 RLE  Heelslides: 1x15 RLE in supine; 1 x 15 RLE in sitting  Gluteal Sets: 10 x B  Knee Active Range of Motion: 0-80 in supine with bulky bandages; 0-90 in sitting      Strength RLE  Comment: Pt able to perform indep SLR 10 x  Strength LLE  Strength LLE: [facility] Score  AM-PAC Inpatient Mobility Raw Score : 24 (10/24/20 0857)  AM-PAC Inpatient T-Scale Score : 61.14 (10/24/20 0857)  Mobility Inpatient CMS 0-100% Score: 0 (10/24/20 0857)  Mobility Inpatient CMS G-Code Modifier : 509 [address] (10/24/20 3431)        Goals  Short term goals  Time Frame for Short term goals: 10/25/20  Short term goal 1: pt to perform indep bed mob- MET  Short term goal 2: pt to perform modified indep transfers- MET  Short term goal 3: pt to amb with  ft supervised- MET 10/23 100 feet with SBA  Short term goal 4: pt to negotiate 3 stairs with rails supervised- MET  Short term goal 5: pt to perform at least 10 reps LE Ex per HEP- MET  Patient Goals   Patient goals : \" to know my HEP and how to use immobilizer\" - MET \"To walk houshold distances (~150 feet) with my walker and with supervision of my family. \" Goal met on 10/23/20    Plan    Plan  Times per week: BID 7 days wk ( anticipate discharge today)  Times per day: Twice a day  Current Treatment Recommendations: Strengthening, Functional Mobility Training, Transfer Training, Gait Training, Stair training, Safety Education & Training, Home Exercise Program  Safety Devices  Type of devices:  All fall risk precautions in place, Call light within reach, Gait belt, Patient at risk for falls, Left in chair, Chair alarm in place     Therapy Time   Individual Concurrent Group Co-treatment   Time In 0733         Time Out 0813         Minutes 40         Timed Code Treatment Minutes: 40 Minutes     Jarrett Arias, PT, DPT

## 2020-10-24 NOTE — CARE COORDINATION
CASE MANAGEMENT DISCHARGE SUMMARY      Discharge to: Home with [address]    Transportation:    Family/car: yes    Confirmed discharge plan with:     Patient: yes per RN     Family, name and contact number: per pt   Facility/Agency, name:  [address] [place] [address] faxed      RN, name: Janneth LEA    Note: Discharging nurse to complete JAYNE, reconcile AVS, and place final copy with patient's discharge packet. RN to ensure that written prescriptions for  Level II medications are sent with patient to the facility as per protocol.

## 2020-10-24 NOTE — PROGRESS NOTES
Pt given d/c instructions and prescriptions. Pt verbalizes understanding. Escorted to vehicle via wheelchair.

## 2020-10-24 NOTE — PROGRESS NOTES
Subjective:     Post-Operative Day: 2 Status Post right Total Knee Arthroplasty  Systemic or Specific Complaints:No Complaints. Had syncopal episode yesterday. Doing great today. Objective:     Patient Vitals for the past 24 hrs:   BP Temp Temp src Pulse Resp SpO2   10/24/20 0003 127/71 98.4 °F (36.9 °C) Oral 60 18 98 %   10/23/20 2008 126/64 98.2 °F (36.8 °C) Oral 74 18 100 %   10/23/20 1550 139/71 98.2 °F (36.8 °C) Oral 58 17 98 %   10/23/20 1449 131/68 97.9 °F (36.6 °C) Oral 65 17 99 %   10/23/20 1137 137/77 -- -- 54 18 --   10/23/20 1133 135/71 97.8 °F (36.6 °C) Oral 54 18 100 %   10/23/20 1003 116/75 98 °F (36.7 °C) Oral 87 18 --   10/23/20 0958 (!) 144/74 -- -- -- -- --   10/23/20 0852 117/72 98.2 °F (36.8 °C) Oral 71 18 --       General: alert, appears stated age, cooperative and no distress   Wound: Wound clean and dry no evidence of infection. , No Drainage and Wound Intact   Motion: Flexion: 0 to 75 Degrees   DVT Exam: No evidence of DVT seen on physical exam.  Negative Jakub's sign. No cords or calf tenderness. Data Review  CBC:   Lab Results   Component Value Date    WBC 9.0 10/24/2020    RBC 3.29 10/24/2020    HGB 10.6 10/24/2020    HCT 30.6 10/24/2020     10/24/2020       Assessment:     Status Post right Total Knee Arthroplasty. Doing well postoperatively.      Plan:     Discharge today, Return to Clinic: 2 weeks

## 2020-10-24 NOTE — DISCHARGE SUMMARY
Department of Orthopedic Surgery  Physician Assistant   Discharge Summary    The Fernanda Mcclain is a 76 y.o. male underwent total knee replacement procedure without complication. Fernanda Mcclain was admitted to the floor following His recovery in the PACU. Discharge Diagnosis  right Knee Replacement    Current Inpatient Medications    Current Facility-Administered Medications: ondansetron (ZOFRAN) injection 4 mg, 4 mg, Intravenous, Q6H PRN  celecoxib (CELEBREX) capsule 100 mg, 100 mg, Oral, BID  0.9 % sodium chloride infusion, , Intravenous, Continuous  sodium chloride flush 0.9 % injection 10 mL, 10 mL, Intravenous, 2 times per day  sodium chloride flush 0.9 % injection 10 mL, 10 mL, Intravenous, PRN  acetaminophen (TYLENOL) tablet 650 mg, 650 mg, Oral, Q6H  morphine (PF) injection 2 mg, 2 mg, Intravenous, Q2H PRN **OR** morphine (PF) injection 4 mg, 4 mg, Intravenous, Q2H PRN  sennosides-docusate sodium (SENOKOT-S) 8.6-50 MG tablet 1 tablet, 1 tablet, Oral, BID  magnesium hydroxide (MILK OF MAGNESIA) 400 MG/5ML suspension 30 mL, 30 mL, Oral, Daily PRN  traMADol (ULTRAM) tablet 50 mg, 50 mg, Oral, Q6H PRN **OR** traMADol (ULTRAM) tablet 100 mg, 100 mg, Oral, Q6H PRN  apixaban (ELIQUIS) tablet 2.5 mg, 2.5 mg, Oral, BID  tamsulosin (FLOMAX) capsule 0.4 mg, 0.4 mg, Oral, Daily  lisinopril (PRINIVIL;ZESTRIL) tablet 10 mg, 10 mg, Oral, Daily **AND** hydroCHLOROthiazide (HYDRODIURIL) tablet 12.5 mg, 12.5 mg, Oral, Daily    Post-operatively the patients diet was advanced as tolerated and their incision was checked on POD #1. The incision is dressing in place, clean, dry and intact with no signs of infection. The patient remained neurovascularly intact in the lower extremity and had intact pulses distally. Patients calf remained soft and showed no evidence of DVT. The patient was able to move their leg and ankle/foot without any problems post-operatively.   Physical therapy and occupational therapy were consulted and began working with the patient post-operatively. The patient progressed with PT/OT as would be expected and continued to improve through their stay. The patients pain was initially controlled with IV medications but we were able to transition to oral pain medications soon after arrival to the floor and their pain remained under good control through their hospital stay. From a medical standpoint the patient remained stable and continued to have the medicine team follow throughout their stay. The patients dressing was changed/incison was checked on day of d/c. The patient will be discharged at this time to Home  with their current diet restrictions and will continue to follow the total knee precautions outlined to them by us and PT/OT. Condition on Discharge: Stable    Plan  Return visit in 2 weeks. .  Patient was instructed on the use of pain medications, the signs and symptoms of infection, and was given our number to call should they have any questions or concerns following discharge. For opioid prescriptions given at discharge the following statement is provided for compliance with [employer] rules. Patient being given increased dosage/quantity of opoid pain medication in excess of OSMB guidelines which noted a 30 MED daily of opioids due to the fact that he/she has undergone major orthopaedic surgery as outlined in rule 4731-11-13. Dosages and further duration of the pain medication will be re-evaluated at her post op visit in 2 weeks. Patient was educated on dosing expectations and limits of prescribing as a result of the new [facility] rules enacted August 31, 2017. Please also note that this is not the initial opoid prescription issued to this patient but a continuation of medication utilized during the hospital admission as noted in the medical record. [place]ARRS report has also been utilized to screen for any abuse history or suspicious activity as outlined in [place]. All efforts have been taken to prevent abuse potential and misuse of opioid medications including education, screening, and close clinical follow up.

## 2020-10-24 NOTE — PLAN OF CARE
Problem: Mobility - Impaired:  Goal: Mobility will improve  Description: Mobility will improve  Outcome: Ongoing   PT/OT consulted. Pt able to ambulate with one person staff assist. No bp drop or dizziness noted when up.

## 2020-10-26 NOTE — TELEPHONE ENCOUNTER
Spoke with pt, doing great. Incision status: No drainage or redness    Edema/Swelling/Teds: Swelling is still there, hasn't gone down much. A lot of bruising. Discussed icing and elevating leg higher than the heart. Pain level and status: Managing well. Use of pain medications: Using celebrex and ultram if needed. Blood thinner: Eliquis with no issues. Bowels: Taking a stool softener. RN recommended pt take a laxative today. Pt complaining of cramping in the stomach. If laxative doesn't work pt should try a suppository. If that doesn't work pt to call RN or office back. Home Care Agency active: [facility] active today.      Outpatient therapy: NA    Do you have all of your medications: Yes    Changes in medications: no    Ortho Vitals: NA    Follow up appointments:    Future Appointments   Date Time Provider Viridiana Del Toro   11/6/2020  9:30 AM Niru Leslie MD AND [facility]

## 2020-11-02 NOTE — TELEPHONE ENCOUNTER
Patient is calling about setting up PT. This was sent to me. But patient had surgery with Dr. Jose Ortega.

## 2020-11-04 NOTE — TELEPHONE ENCOUNTER
Spoke with pt, doing well. Incision status: No drainage or redness    Edema/Swelling/Teds: Swelling is going down. Pain level and status: Managing well. Use of pain medications: Using only as needed. Blood thinner: Eliquis with no issues. Bowels: Moving fine. Home Care Agency active: [facility]- going well. Outpatient therapy: NA    Do you have all of your medications: Yes    Changes in medications: no    Ortho Vitals: NA  Signed off from pt. Instructed pt to call RN or [facility] office with any issues or concerns.     Follow up appointments:    Future Appointments   Date Time Provider Viridiana Del Toro   11/6/2020  9:30 AM Niru Leslie MD AND [facility]

## 2020-11-06 NOTE — PROGRESS NOTES
Brooke Wong in follow-up after his 10/22/2020 right knee replacement. Overall he is doing extremely well. He is very pleased. Is willing to take much of anything medications other than finishing up his Eliquis. The incision looks great. Range of motion is 3 to 120 degrees. No signs of DVT. Knee 3x-ray views of the right knee demonstrates a well-positioned right knee replacement. Doing well. Convert to outpatient physical therapy and follow-up in 3 weeks. No x-rays. I have personally performed and/or participated in the history, exam and medical decision making and agree with all pertinent clinical information. I have also reviewed and agree with the past medical, family and social history unless otherwise noted. This dictation was performed with a verbal recognition program (DRAGON) and it was checked for errors. It is possible that there are still dictated errors within this office note. If so, please bring any errors to my attention for an addendum. All efforts were made to ensure that this office note is accurate.           Patricia Chávez MD

## 2020-11-09 NOTE — PLAN OF CARE
[facility] [facility], [facility]  [address] [place] [address]  Phone: [phone]  Fax [phone]     Physical Therapy Certification    Dear Referring Practitioner: Paul Cruz MD,    We had the pleasure of evaluating the following patient for physical therapy services at [address] [place] [address]. A summary of our findings can be found in the initial assessment below. This includes our plan of care. If you have any questions or concerns regarding these findings, please do not hesitate to contact me at the office phone number checked above. Thank you for the referral.       Physician Signature:_______________________________Date:__________________  By signing above (or electronic signature), therapists plan is approved by physician    Patient: Keyana Cochran   : 1945   MRN: [id number]  Referring Physician: Referring Practitioner: Paul Cruz MD      Evaluation Date: 2020      Medical Diagnosis Information:  Diagnosis: R knee pain M25.561 s/p R TKA 10/22/20    Treatment diagnosis: R knee pain M25.561; difficulty walking R26.2                                        Insurance information:  trueEX       Precautions/ Contra-indications:     C-SSRS Triggered by Intake questionnaire (Past 2 wk assessment):   [x] No, Questionnaire did not trigger screening.   [] Yes, Patient intake triggered further evaluation      [] C-SSRS Screening completed  [] PCP notified via Plan of Care  [] Emergency services notified     Latex Allergy:  [x]NO      []YES  Preferred Language for Healthcare:   [x]English       []other:    SUBJECTIVE: Patient stated complaint: Pt had R TKA on 10/22/20. Pain hasn't been too bad, no longer on pain meds. Has started driving and knee feels stiff but not too bad. He is taking ibuprofen as needed. No longer icing. He's had home health PT for past 2 weeks.     Relevant Medical History:Hx L TKA   Functional Disability Index: LEFS 39/80=51% disability    Height 5'6\" [id number]  Pain Scale: 1-5/10  Easing factors: ibuprofen as needed  Provocative factors: stairs, stiffness with driving/prolonged sitting     Type: []Constant   [x]Intermittent  []Radiating [x]Localized []other:     Numbness/Tingling: none    Occupation/School: works part-time 2 days/week driving for [employer] Status/Prior Level of Function: Independent with ADLs and IADLs, lives with spouse and babysits 1year old great grandson; would like to return to work and golf    OBJECTIVE:     ROM LEFT RIGHT   HIP Flex     HIP Abd     HIP Ext     HIP IR     HIP ER     Knee ext  Lacking 3 deg from 0   Knee Flex  107 deg   Ankle PF     Ankle DF     Ankle In     Ankle Ev     Strength  LEFT RIGHT   HIP Flexors     HIP Abductors     HIP Ext     Hip ER     Knee EXT (quad)     Knee Flex (HS)     Ankle DF     Ankle PF     Ankle Inv     Ankle EV     Quad tone  Fair-   Circumference  Mid apex  7 cm prox             Reflexes/Sensation:    []Dermatomes/Myotomes intact    [x]Reflexes equal and normal bilaterally   []Other:    Joint mobility:    [x]Normal    []Hypo   []Hyper    Palpation: Mod edema consistent with post op status. No diffuse TTP noted. Functional Mobility/Transfers: independent    Posture: WFL    Bandages/Dressings/Incisions: incision is dry, healing well; no signs of infection    Gait: (include devices/WB status)  R antalgic gait; amb I'ly    Orthopedic Special Tests: (-) Homans R                       [x] Patient history, allergies, meds reviewed. Medical chart reviewed. See intake form. Review Of Systems (ROS):  [x]Performed Review of systems (Integumentary, CardioPulmonary, Neurological) by intake and observation. Intake form has been scanned into medical record. Patient has been instructed to contact their primary care physician regarding ROS issues if not already being addressed at this time.       Co-morbidities/Complexities (which will affect course of strength   [x]Reduced balance/proprioceptive control   []other:      Functional Activity Limitations (from functional questionnaire and intake)   [x]Reduced ability to tolerate prolonged functional positions   [x]Reduced ability or difficulty with changes of positions or transfers between positions   [x]Reduced ability to maintain good posture and demonstrate good body mechanics with sitting, bending, and lifting   [x]Reduced ability to sleep   [x] Reduced ability or tolerance with driving and/or computer work   []Reduced ability to perform lifting, carrying tasks   [x]Reduced ability to squat   []Reduced ability to forward bend   [x]Reduced ability to ambulate prolonged functional periods/distances/surfaces   [x]Reduced ability to ascend/descend stairs   [x]Reduced ability to run, hop, cut or jump   []other:    Participation Restrictions   [x]Reduced participation in self care activities   [x]Reduced participation in home management activities   [x]Reduced participation in work activities   [x]Reduced participation in social activities. [x]Reduced participation in sport/recreation activities. Classification :    [x]Signs/symptoms consistent with post-surgical status including decreased ROM, strength and function.    []Signs/symptoms consistent with joint sprain/strain   []Signs/symptoms consistent with patella-femoral syndrome   []Signs/symptoms consistent with knee OA/hip OA   []Signs/symptoms consistent with internal derangement of knee/Hip   []Signs/symptoms consistent with functional hip weakness/NMR control      []Signs/symptoms consistent with tendinitis/tendinosis    []signs/symptoms consistent with pathology which may benefit from Dry needling      []other:      Prognosis/Rehab Potential:      [x]Excellent   []Good    []Fair   []Poor    Tolerance of evaluation/treatment:    [x]Excellent   []Good    []Fair   []Poor  Physical Therapy Evaluation Complexity Justification  [x] A history of present problem with:  [] no personal factors and/or comorbidities that impact the plan of care;  [x]1-2 personal factors and/or comorbidities that impact the plan of care  []3 personal factors and/or comorbidities that impact the plan of care  [x] An examination of body systems using standardized tests and measures addressing any of the following: body structures and functions (impairments), activity limitations, and/or participation restrictions;:  [] a total of 1-2 or more elements   [] a total of 3 or more elements   [x] a total of 4 or more elements   [x] A clinical presentation with:  [x] stable and/or uncomplicated characteristics   [] evolving clinical presentation with changing characteristics  [] unstable and unpredictable characteristics;   [x] Clinical decision making of [x] low, [] moderate, [] high complexity using standardized patient assessment instrument and/or measurable assessment of functional outcome. [x] EVAL (LOW) 41619 (typically 20 minutes face-to-face)  [] EVAL (MOD) 17730 (typically 30 minutes face-to-face)  [] EVAL (HIGH) 86964 (typically 45 minutes face-to-face)  [] RE-EVAL       PLAN:   Frequency/Duration: 2 days per week for 12 Weeks:  Interventions:  [x]  Therapeutic exercise including: strength training, ROM, for Lower extremity and core   [x]  NMR activation and proprioception for LE, Glutes and Core   [x]  Manual therapy as indicated for LE, Hip and spine to include: Dry Needling/IASTM, STM, PROM, Gr I-IV mobilizations, manipulation. [x] Modalities as needed that may include: thermal agents, E-stim, Biofeedback, US, iontophoresis as indicated  [x] Patient education on joint protection, postural re-education, activity modification, progression of HEP. HEP instruction: handout given; [employer] account created (see scanned forms)  Access Code: [email or web address]   URL: [email or web address]   Date: 11/09/2020   Prepared by: Silvia Nuno     Exercises   Long Sitting Quad Set - 10 reps - 1 sets - 10 hold - 2-3x daily - 7x weekly   Long Sitting Calf Stretch with Strap - 3 reps - 1 sets - 30 hold - 2-3x daily - 7x weekly   Seated Table Hamstring Stretch - 3 reps - 1 sets - 30\" hold - 2-3x daily - 7x weekly   Sitting Heel Slide with Towel - 10 reps - 1 sets - 10 sec hold - 2x daily - 7x weekly   Supine Straight Leg Raises - 10 reps - 3 sets - 2-3x daily - 7x weekly   Seated Long Arc Quad with Hip Adduction - 10 reps - 2-3 sets - 5 sec hold - 2x daily - 7x weekly    GOALS:  Patient stated goal: full use of knee, return to work and golf  [] Progressing: [] Met: [] Not Met: [] Adjusted    Therapist goals for Patient:   Short Term Goals: To be achieved in: 2 weeks  1. Independent in HEP and progression per patient tolerance, in order to prevent re-injury. [] Progressing: [] Met: [] Not Met: [] Adjusted  2. Patient will have a decrease in pain to facilitate improvement in movement, function, and ADLs as indicated by Functional Deficits. [] Progressing: [] Met: [] Not Met: [] Adjusted    Long Term Goals: To be achieved in: 12 weeks  1. Disability index score of 25% or less for the LEFS to assist with reaching prior level of function. [] Progressing: [] Met: [] Not Met: [] Adjusted  2. Patient will demonstrate increased AROM to 0-120 deg to allow for proper joint functioning as indicated by patients Functional Deficits. [] Progressing: [] Met: [] Not Met: [] Adjusted  3. Patient will demonstrate an increase in Strength to good quad tone and good proximal hip strength and control, within 5lb HHD in LE to allow for proper functional mobility as indicated by patients Functional Deficits. [] Progressing: [] Met: [] Not Met: [] Adjusted  4. Patient will return to negotiating stairs with reciprocal pattern without increased symptoms or restriction. [] Progressing: [] Met: [] Not Met: [] Adjusted  5.  Patient will be able to return to work without restrictions. (patient specific functional goal)    [] Progressing: [] Met: [] Not Met: [] Adjusted     Electronically signed by:  Miki Will, PT

## 2020-11-09 NOTE — FLOWSHEET NOTE
[facility],  [address] [place]  Phone: [phone]  Fax [phone]    Physical Therapy Treatment Note/ Progress Report:           Date:  2020    Patient Name:  Rufina Lyon    :  1945  MRN: [id number]  Restrictions/Precautions:    Medical/Treatment Diagnosis Information:  Diagnosis: R knee pain M25.561 s/p R TKA 10/22/20             Treatment diagnosis: R knee pain M25.561; difficulty walking R26.2      ·    Insurance/Certification information:   [facility]  Physician Information:  Referring Practitioner: Norm Burnham MD  Has the plan of care been signed (Y/N):        []  Yes  [x]  No     Date of Patient follow up with Physician:       Is this a Progress Report:     []  Yes  [x]  No        If Yes:  Date Range for reporting period:  Beginning 20  Ending    Progress report will be due (10 Rx or 30 days whichever is less):        Recertification will be due (POC Duration  / 90 days whichever is less): 21      Visit # Insurance Allowable Auth Required   In-person 1  []  Yes []  No    Telehealth   []  Yes []  No    Total          Functional Scale: LEFS 51%    Date assessed:  20      Therapy Diagnosis/Practice Pattern:H      Number of Comorbidities:  []0     [x]1-2    []3+    Latex Allergy:  [x]NO      []YES  Preferred Language for Healthcare:   [x]English       []other:      Pain level:  1-5/10     SUBJECTIVE:  See eval    OBJECTIVE: See eval   Observation:    Test measurements:      RESTRICTIONS/PRECAUTIONS:     Exercises/Interventions:     Therapeutic Ex (24205) Sets/sec Reps Notes/CUES   QS 10x10\"     Gastroc Belt S 3x30\"     Table side HS S 3x30\"     Heel slide 10x10\"     LAQ with ADD 10x5\"     SLR  x10                             Pt ed: HEP, ice, POC 5'           Manual Intervention (88696)                                          NMR re-education (71320)   CUES NEEDED   Independent gait 2x40' Cues for heel strike   Stairs with B rails 2 sets                                               Therapeutic Activity (26482)                                                Therapeutic Exercise and NMR EXR  [x] (27156) Provided verbal/tactile cueing for activities related to strengthening, flexibility, endurance, ROM for improvements in LE, proximal hip, and core control with self care, mobility, lifting, ambulation.  [] (32582) Provided verbal/tactile cueing for activities related to improving balance, coordination, kinesthetic sense, posture, motor skill, proprioception  to assist with LE, proximal hip, and core control in self care, mobility, lifting, ambulation and eccentric single leg control.      NMR and Therapeutic Activities:    [x] (05258 or 54578) Provided verbal/tactile cueing for activities related to improving balance, coordination, kinesthetic sense, posture, motor skill, proprioception and motor activation to allow for proper function of core, proximal hip and LE with self care and ADLs  [] (19466) Gait Re-education- Provided training and instruction to the patient for proper LE, core and proximal hip recruitment and positioning and eccentric body weight control with ambulation re-education including up and down stairs     Home Exercise Program:    [x] (34268) Reviewed/Progressed HEP activities related to strengthening, flexibility, endurance, ROM of core, proximal hip and LE for functional self-care, mobility, lifting and ambulation/stair navigation   [] (97575)Reviewed/Progressed HEP activities related to improving balance, coordination, kinesthetic sense, posture, motor skill, proprioception of core, proximal hip and LE for self care, mobility, lifting, and ambulation/stair navigation      Manual Treatments:  PROM / STM / Oscillations-Mobs:  G-I, II, III, IV (PA's, Inf., Post.)  [] (14176) Provided manual therapy to mobilize LE, proximal hip and/or LS spine soft tissue/joints for the purpose of modulating pain, promoting relaxation,  increasing ROM, reducing/eliminating soft tissue swelling/inflammation/restriction, improving soft tissue extensibility and allowing for proper ROM for normal function with self care, mobility, lifting and ambulation. Modalities:  CP x10' R knee   [] GAME READY (VASO)- for significant edema, swelling, pain control. Charges:  Timed Code Treatment Minutes: 20   Total Treatment Minutes: 45     [facility] time in/time out:   (and requires time in and out for each CPT code)    [x] EVAL (LOW) 99852   [] EVAL (MOD) 75928  [] EVAL (HIGH) 24788   [] RE-EVAL     [x] ZU(13873) x1   [] IONTO  [] NMR (87355) x     [] VASO  [] Manual (16568) x      [] Other:  [] TA x      [] Mech Traction (01340)  [] ES(attended) (25478)      [] ES (un) (93666):       GOALS:   Patient stated goal: full use of knee, return to work and golf  []? Progressing: []? Met: []? Not Met: []? Adjusted     Therapist goals for Patient:   Short Term Goals: To be achieved in: 2 weeks  1. Independent in HEP and progression per patient tolerance, in order to prevent re-injury. []? Progressing: []? Met: []? Not Met: []? Adjusted  2. Patient will have a decrease in pain to facilitate improvement in movement, function, and ADLs as indicated by Functional Deficits. []? Progressing: []? Met: []? Not Met: []? Adjusted     Long Term Goals: To be achieved in: 12 weeks  1. Disability index score of 25% or less for the LEFS to assist with reaching prior level of function. []? Progressing: []? Met: []? Not Met: []? Adjusted  2. Patient will demonstrate increased AROM to 0-120 deg to allow for proper joint functioning as indicated by patients Functional Deficits. []? Progressing: []? Met: []? Not Met: []? Adjusted  3.  Patient will demonstrate an increase in Strength to good quad tone and good proximal hip strength and control, within 5lb HHD in LE to allow for proper functional mobility as indicated by patients Functional care [] Alter current plan (see comments above)  [x] Plan of care initiated [] Hold pending MD visit [] Discharge      Electronically signed by:  Yusef Knox PT    Note: If patient does not return for scheduled/ recommended follow up visits, this note will serve as a discharge from care along with most recent update on progress.

## 2020-11-12 NOTE — FLOWSHEET NOTE
[facility],  [address]  Phone: [phone]  Fax [phone]    Physical Therapy Treatment Note/ Progress Report:           Date:  2020    Patient Name:  Leny Chowdary    :  1945  MRN: [id number]  Restrictions/Precautions:    Medical/Treatment Diagnosis Information:  Diagnosis: R knee pain M25.561 s/p R TKA 10/22/20             Treatment diagnosis: R knee pain M25.561; difficulty walking R26.2         Insurance/Certification information:   [facility]  Physician Information:  Referring Practitioner: Marc Ivan MD  Has the plan of care been signed (Y/N):        []  Yes  [x]  No     Date of Patient follow up with Physician:       Is this a Progress Report:     []  Yes  [x]  No        If Yes:  Date Range for reporting period:  Beginning 20  Ending    Progress report will be due (10 Rx or 30 days whichever is less):        Recertification will be due (POC Duration  / 90 days whichever is less): 21      Visit # Insurance Allowable Auth Required   In-person 2  []  Yes []  No    Telehealth   []  Yes []  No    Total          Functional Scale: LEFS 51%    Date assessed:  20      Therapy Diagnosis/Practice Pattern:H      Number of Comorbidities:  []0     [x]1-2    []3+    Latex Allergy:  [x]NO      []YES  Preferred Language for Healthcare:   [x]English       []other:      Pain level:  1-5/10     SUBJECTIVE:  Pt states he has a little dull pain in knee today. No problems with HEP.     OBJECTIVE:    Observation: decreased lower leg edema today compared with previous session   Ext lag with SLR continues   Test measurements:  AROM R knee flexion 118 deg    RESTRICTIONS/PRECAUTIONS:     Exercises/Interventions:     Therapeutic Ex (32707) Sets/sec Reps Notes/CUES   QS     Gastroc Belt S     Table side HS S     Heel slide     LAQ with ADD 10x5\"     SLR  2x10     SL hip ABD 2x10 B     Bridge with ADD 2x10     SAQ 2x10x5\"           Pt ed: HEP, ice, POC 5'           Manual Intervention (45169)      Patellar mobs; manual  HS S, modified Beto S, PROM R knee flexion; PROM R knee ext with contract/relax overpressure  15'                                   NMR re-education (93013)   CUES NEEDED   Independent gait  Cues for heel strike   Stairs with B rails           Standing HR/TR x15 ea     Mini squat 2x10     Tandem stance airex 5x10\" R/L                       Therapeutic Activity (32357)                                                Therapeutic Exercise and NMR EXR  [x] (31858) Provided verbal/tactile cueing for activities related to strengthening, flexibility, endurance, ROM for improvements in LE, proximal hip, and core control with self care, mobility, lifting, ambulation.  [] (45224) Provided verbal/tactile cueing for activities related to improving balance, coordination, kinesthetic sense, posture, motor skill, proprioception  to assist with LE, proximal hip, and core control in self care, mobility, lifting, ambulation and eccentric single leg control.      NMR and Therapeutic Activities:    [x] (44485 or 73213) Provided verbal/tactile cueing for activities related to improving balance, coordination, kinesthetic sense, posture, motor skill, proprioception and motor activation to allow for proper function of core, proximal hip and LE with self care and ADLs  [] (65249) Gait Re-education- Provided training and instruction to the patient for proper LE, core and proximal hip recruitment and positioning and eccentric body weight control with ambulation re-education including up and down stairs     Home Exercise Program:    [x] (12947) Reviewed/Progressed HEP activities related to strengthening, flexibility, endurance, ROM of core, proximal hip and LE for functional self-care, mobility, lifting and ambulation/stair navigation   [] (18960)Reviewed/Progressed HEP activities related to improving balance, coordination, kinesthetic sense, posture, motor skill, proprioception of core, proximal hip and LE for self care, mobility, lifting, and ambulation/stair navigation      Manual Treatments:  PROM / STM / Oscillations-Mobs:  G-I, II, III, IV (PA's, Inf., Post.)  [x] (55054) Provided manual therapy to mobilize LE, proximal hip and/or LS spine soft tissue/joints for the purpose of modulating pain, promoting relaxation,  increasing ROM, reducing/eliminating soft tissue swelling/inflammation/restriction, improving soft tissue extensibility and allowing for proper ROM for normal function with self care, mobility, lifting and ambulation. Modalities:  CP x10' R knee   [] GAME READY (VASO)- for significant edema, swelling, pain control. Charges:  Timed Code Treatment Minutes: 45   Total Treatment Minutes: 55     [facility] time in/time out:   (and requires time in and out for each CPT code)    [] EVAL (LOW) 455 1011   [] EVAL (MOD) 92636  [] EVAL (HIGH) 18270   [] RE-EVAL     [x] AD(92132) x1   [] IONTO  [x] NMR (42386) x 1    [] VASO  [x] Manual (43435) x1      [] Other:  [] TA x      [] Mech Traction (19854)  [] ES(attended) (23128)      [] ES (un) (95892):       GOALS:   Patient stated goal: full use of knee, return to work and golf  []? Progressing: []? Met: []? Not Met: []? Adjusted     Therapist goals for Patient:   Short Term Goals: To be achieved in: 2 weeks  1. Independent in HEP and progression per patient tolerance, in order to prevent re-injury. []? Progressing: []? Met: []? Not Met: []? Adjusted  2. Patient will have a decrease in pain to facilitate improvement in movement, function, and ADLs as indicated by Functional Deficits. []? Progressing: []? Met: []? Not Met: []? Adjusted     Long Term Goals: To be achieved in: 12 weeks  1. Disability index score of 25% or less for the LEFS to assist with reaching prior level of function. []? Progressing: []? Met: []? Not Met: []? Adjusted  2.  Patient will demonstrate increased AROM knee flexion ROM noted. Continue to focus on quad strengthening, achieving TKE, and functional activities. Return to Play: (if applicable)   []  Stage 1: Intro to Strength   []  Stage 2: Return to Run and Strength   []  Stage 3: Return to Jump and Strength   []  Stage 4: Dynamic Strength and Agility   []  Stage 5: Sport Specific Training     []  Ready to Return to Play, Meets All Above Stages   []  Not Ready for Return to Sports   Comments:                               PLAN: See eval  [x] Continue per plan of care [] Alter current plan (see comments above)  [] Plan of care initiated [] Hold pending MD visit [] Discharge      Electronically signed by:  Anson Mora PT    Note: If patient does not return for scheduled/ recommended follow up visits, this note will serve as a discharge from care along with most recent update on progress.

## 2020-11-16 NOTE — FLOWSHEET NOTE
Patient will demonstrate increased AROM to 0-120 deg to allow for proper joint functioning as indicated by patients Functional Deficits. []? Progressing: []? Met: []? Not Met: []? Adjusted  3. Patient will demonstrate an increase in Strength to good quad tone and good proximal hip strength and control, within 5lb HHD in LE to allow for proper functional mobility as indicated by patients Functional Deficits. []? Progressing: []? Met: []? Not Met: []? Adjusted  4. Patient will return to negotiating stairs with reciprocal pattern without increased symptoms or restriction. []? Progressing: []? Met: []? Not Met: []? Adjusted  5. Patient will be able to return to work without restrictions. (patient specific functional goal)    []? Progressing: []? Met: []? Not Met: []? Adjusted         Progression Towards Functional goals:  [x] Patient is progressing as expected towards functional goals listed. [] Progression is slowed due to complexities listed. [] Progression has been slowed due to co-morbidities. [] Plan just implemented, too soon to assess goals progression  [] Other:         Overall Progression Towards Functional goals/ Treatment Progress Update:  [] Patient is progressing as expected towards functional goals listed. [] Progression is slowed due to complexities/Impairments listed. [] Progression has been slowed due to co-morbidities.   [x] Plan just implemented, too soon to assess goals progression <30days   [] Goals require adjustment due to lack of progress  [] Patient is not progressing as expected and requires additional follow up with physician  [] Other    Prognosis for POC: [x] Good [] Fair  [] Poor      Patient requires continued skilled intervention: [x] Yes  [] No    Treatment/Activity Tolerance:  [x] Patient able to complete treatment  [] Patient limited by fatigue  [] Patient limited by pain    [] Patient limited by other medical complications  [] Other:     ASSESSMENT: Pt amb with normalized gait pattern for short distances. No increase pain reported during treatment today. Minimal progression of TE d/t increased pain following last session. Pt able to perform full revolution on bike without difficulty. Return to Play: (if applicable)   []  Stage 1: Intro to Strength   []  Stage 2: Return to Run and Strength   []  Stage 3: Return to Jump and Strength   []  Stage 4: Dynamic Strength and Agility   []  Stage 5: Sport Specific Training     []  Ready to Return to Play, Meets All Above Stages   []  Not Ready for Return to Sports   Comments:                               PLAN: See eval  [x] Continue per plan of care [] Alter current plan (see comments above)  [] Plan of care initiated [] Hold pending MD visit [] Discharge      Electronically signed by:  Annamaria Archer PT     Note: If patient does not return for scheduled/ recommended follow up visits, this note will serve as a discharge from care along with most recent update on progress.

## 2020-11-19 NOTE — FLOWSHEET NOTE
[facility], [address] [place]  Phone: [phone]  Fax [phone]    Physical Therapy Treatment Note/ Progress Report:           Date:  2020    Patient Name:  Bobbi Marx    :  1945  MRN: [id number]  Restrictions/Precautions:    Medical/Treatment Diagnosis Information:  Diagnosis: R knee pain M25.561 s/p R TKA 10/22/20             Treatment diagnosis: R knee pain M25.561; difficulty walking R26.2         Insurance/Certification information:   [facility]  Physician Information:  Referring Practitioner: Dori Rangel MD  Has the plan of care been signed (Y/N):        []  Yes  [x]  No     Date of Patient follow up with Physician:       Is this a Progress Report:     []  Yes  [x]  No        If Yes:  Date Range for reporting period:  Beginning 20  Ending    Progress report will be due (10 Rx or 30 days whichever is less):        Recertification will be due (POC Duration  / 90 days whichever is less): 21      Visit # Insurance Allowable Auth Required   In-person 4  []  Yes []  No    Telehealth   []  Yes []  No    Total          Functional Scale: LEFS 51%    Date assessed:  20      Therapy Diagnosis/Practice Pattern:H      Number of Comorbidities:  []0     [x]1-2    []3+    Latex Allergy:  [x]NO      []YES  Preferred Language for Healthcare:   [x]English       []other:      Pain level:  1-5/10     SUBJECTIVE:  Pt states that his knee is feeling better. Still wakes him up at night. Just had a good f/u with PA. She told pt he's probably overdoing it a bit.   OBJECTIVE:    Observation: quad tone improving   Ext lag with SLR continues   Test measurements:  AROM R knee flexion 130 deg    RESTRICTIONS/PRECAUTIONS:     Exercises/Interventions:     Therapeutic Ex (28191) Sets/sec Reps Notes/CUES   QS     Gastroc Belt S 3x30\"     Table side HS S 3x30\"     Heel slide     LAQ with ADD      SLR      SL hip activities related to improving balance, coordination, kinesthetic sense, posture, motor skill, proprioception of core, proximal hip and LE for self care, mobility, lifting, and ambulation/stair navigation      Manual Treatments:  PROM / STM / Oscillations-Mobs:  G-I, II, III, IV (PA's, Inf., Post.)  [x] (09368) Provided manual therapy to mobilize LE, proximal hip and/or LS spine soft tissue/joints for the purpose of modulating pain, promoting relaxation,  increasing ROM, reducing/eliminating soft tissue swelling/inflammation/restriction, improving soft tissue extensibility and allowing for proper ROM for normal function with self care, mobility, lifting and ambulation. Modalities:    [] GAME READY (VASO)- for significant edema, swelling, pain control. Charges:  Timed Code Treatment Minutes: 45   Total Treatment Minutes: 45     [facility] time in/time out:   (and requires time in and out for each CPT code)    [] EVAL (LOW) 81160   [] EVAL (MOD) 52814  [] EVAL (HIGH) 06112   [] RE-EVAL     [x] VE(61186) x1   [] IONTO  [x] NMR (63335) x 1    [] VASO   [x] Manual (46779) x1      [] Other:  [] TA x      [] Mech Traction (68053)  [] ES(attended) (08065)      [] ES (un) (50500):       GOALS:   Patient stated goal: full use of knee, return to work and golf  []? Progressing: []? Met: []? Not Met: []? Adjusted     Therapist goals for Patient:   Short Term Goals: To be achieved in: 2 weeks  1. Independent in HEP and progression per patient tolerance, in order to prevent re-injury. []? Progressing: []? Met: []? Not Met: []? Adjusted  2. Patient will have a decrease in pain to facilitate improvement in movement, function, and ADLs as indicated by Functional Deficits. []? Progressing: []? Met: []? Not Met: []? Adjusted     Long Term Goals: To be achieved in: 12 weeks  1. Disability index score of 25% or less for the LEFS to assist with reaching prior level of function. []? Progressing: []? Met: []?  Not Met: []? Adjusted  2. Patient will demonstrate increased AROM to 0-120 deg to allow for proper joint functioning as indicated by patients Functional Deficits. []? Progressing: []? Met: []? Not Met: []? Adjusted  3. Patient will demonstrate an increase in Strength to good quad tone and good proximal hip strength and control, within 5lb HHD in LE to allow for proper functional mobility as indicated by patients Functional Deficits. []? Progressing: []? Met: []? Not Met: []? Adjusted  4. Patient will return to negotiating stairs with reciprocal pattern without increased symptoms or restriction. []? Progressing: []? Met: []? Not Met: []? Adjusted  5. Patient will be able to return to work without restrictions. (patient specific functional goal)    []? Progressing: []? Met: []? Not Met: []? Adjusted         Progression Towards Functional goals:  [x] Patient is progressing as expected towards functional goals listed. [] Progression is slowed due to complexities listed. [] Progression has been slowed due to co-morbidities. [] Plan just implemented, too soon to assess goals progression  [] Other:         Overall Progression Towards Functional goals/ Treatment Progress Update:  [] Patient is progressing as expected towards functional goals listed. [] Progression is slowed due to complexities/Impairments listed. [] Progression has been slowed due to co-morbidities.   [x] Plan just implemented, too soon to assess goals progression <30days   [] Goals require adjustment due to lack of progress  [] Patient is not progressing as expected and requires additional follow up with physician  [] Other    Prognosis for POC: [x] Good [] Fair  [] Poor      Patient requires continued skilled intervention: [x] Yes  [] No    Treatment/Activity Tolerance:  [x] Patient able to complete treatment  [] Patient limited by fatigue  [] Patient limited by pain    [] Patient limited by other medical complications  [] Other:     ASSESSMENT: Significant improvement with knee flexion ROM noted. Pt is doing well; cautioned him against overdoing activities/stairs at home. Ecc quad weakness noted during FSD. No c/o pain during treatment but fatigue noted following. Return to Play: (if applicable)   []  Stage 1: Intro to Strength   []  Stage 2: Return to Run and Strength   []  Stage 3: Return to Jump and Strength   []  Stage 4: Dynamic Strength and Agility   []  Stage 5: Sport Specific Training     []  Ready to Return to Play, Meets All Above Stages   []  Not Ready for Return to Sports   Comments:                               PLAN: See eval  [x] Continue per plan of care [] Alter current plan (see comments above)  [] Plan of care initiated [] Hold pending MD visit [] Discharge      Electronically signed by:  Ijeoma Church PT     Note: If patient does not return for scheduled/ recommended follow up visits, this note will serve as a discharge from care along with most recent update on progress.

## 2020-11-19 NOTE — PROGRESS NOTES
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 Pain control has been satisfactory with oral medications. There have been no fevers or chills. r he states that his range of motion is actually coming along more quickly than his contralateral knee did back in 2013. However, he has some concerns as he still feeling some soreness in the knee that is occasionally waking him up in the middle of the night. 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 Pain Assessment  Location of Pain: Knee  Location Modifiers: Right  Severity of Pain: 3  Quality of Pain: Dull, Aching  Duration of Pain: A few hours  Frequency of Pain: Several times daily  Aggravating Factors: Stretching, Bending, Walking, Stairs  Limiting Behavior: Some  Relieving Factors: Rest]     Physical examination: The incision is clean, dry, and intact with no drainage or signs of infection. Range of motion is 0 degrees of extension to 120 degrees of flexion. There is expected swelling with no evidence of DVT and a negative Homans sign. Neurovascular exam is intact. Assessment/plan:   The patient is doing wonderful in terms of range of motion. I do believe that he is on his feet quite a bit throughout the day and admits its likely several hours a day. Then at night, he struggles with stiffness and soreness in the knee. I recommended that he look to modify his daytime activities and limit some time on his feet. He also is also been to try a dose of anti-inflammatory with GI precautions during the day. If his symptoms do not improve over the next 2 weeks we will bring him in for follow-up. Laly Mcnulty

## 2020-11-23 NOTE — FLOWSHEET NOTE
[facility],  [address]  Phone: [phone]  Fax [phone]    Physical Therapy Treatment Note/ Progress Report:           Date:  2020    Patient Name:  Reanna Payan    :  1945  MRN: [id number]  Restrictions/Precautions:    Medical/Treatment Diagnosis Information:  Diagnosis: R knee pain M25.561 s/p R TKA 10/22/20             Treatment diagnosis: R knee pain M25.561; difficulty walking R26.2         Insurance/Certification information:   [facility]  Physician Information:  Referring Practitioner: Umu Valle MD  Has the plan of care been signed (Y/N):        []  Yes  [x]  No     Date of Patient follow up with Physician:       Is this a Progress Report:     []  Yes  [x]  No        If Yes:  Date Range for reporting period:  Beginning 20  Ending    Progress report will be due (10 Rx or 30 days whichever is less):        Recertification will be due (POC Duration  / 90 days whichever is less): 21      Visit # Insurance Allowable Auth Required   In-person 5 BMN []  Yes []  No    Telehealth   []  Yes []  No    Total          Functional Scale: LEFS 51%    Date assessed:  20      Therapy Diagnosis/Practice Pattern:H      Number of Comorbidities:  []0     [x]1-2    []3+    Latex Allergy:  [x]NO      []YES  Preferred Language for Healthcare:   [x]English       []other:      Pain level:  1/10     SUBJECTIVE:  Pt reports that today his knee feels the best it has since surgery. He took it easy over the weekend, just walked. OBJECTIVE:    Observation: incision is closed, healing well. Educated pt on scar massage today.    PROM R knee ext 0 deg with overpressure   Test measurements:  AROM R knee flexion 130 deg    RESTRICTIONS/PRECAUTIONS:     Exercises/Interventions:     Therapeutic Ex (15313) Sets/sec Reps Notes/CUES   QS     Gastroc Belt S 3x30\"     Table side HS S 3x30\"     Heel slide     LAQ reaching prior level of function. []? Progressing: []? Met: []? Not Met: []? Adjusted  2. Patient will demonstrate increased AROM to 0-120 deg to allow for proper joint functioning as indicated by patients Functional Deficits. []? Progressing: []? Met: []? Not Met: []? Adjusted  3. Patient will demonstrate an increase in Strength to good quad tone and good proximal hip strength and control, within 5lb HHD in LE to allow for proper functional mobility as indicated by patients Functional Deficits. []? Progressing: []? Met: []? Not Met: []? Adjusted  4. Patient will return to negotiating stairs with reciprocal pattern without increased symptoms or restriction. []? Progressing: []? Met: []? Not Met: []? Adjusted  5. Patient will be able to return to work without restrictions. (patient specific functional goal)    []? Progressing: []? Met: []? Not Met: []? Adjusted         Progression Towards Functional goals:  [x] Patient is progressing as expected towards functional goals listed. [] Progression is slowed due to complexities listed. [] Progression has been slowed due to co-morbidities. [] Plan just implemented, too soon to assess goals progression  [] Other:         Overall Progression Towards Functional goals/ Treatment Progress Update:  [] Patient is progressing as expected towards functional goals listed. [] Progression is slowed due to complexities/Impairments listed. [] Progression has been slowed due to co-morbidities.   [x] Plan just implemented, too soon to assess goals progression <30days   [] Goals require adjustment due to lack of progress  [] Patient is not progressing as expected and requires additional follow up with physician  [] Other    Prognosis for POC: [x] Good [] Fair  [] Poor      Patient requires continued skilled intervention: [x] Yes  [] No    Treatment/Activity Tolerance:  [x] Patient able to complete treatment  [] Patient limited by fatigue  [] Patient limited by pain    [] Patient limited by other medical complications  [] Other:     ASSESSMENT: Decreased treatment time today as pt had to leave early for another appointment. He is progressing well. Continued HS tightness noted, but pt able to achieve 0 deg knee ext with overpressure. Return to Play: (if applicable)   []  Stage 1: Intro to Strength   []  Stage 2: Return to Run and Strength   []  Stage 3: Return to Jump and Strength   []  Stage 4: Dynamic Strength and Agility   []  Stage 5: Sport Specific Training     []  Ready to Return to Play, Meets All Above Stages   []  Not Ready for Return to Sports   Comments:                               PLAN: See eval  [x] Continue per plan of care [] Alter current plan (see comments above)  [] Plan of care initiated [] Hold pending MD visit [] Discharge      Electronically signed by:  Shellee Favre, PT     Note: If patient does not return for scheduled/ recommended follow up visits, this note will serve as a discharge from care along with most recent update on progress.

## 2020-11-25 NOTE — PROGRESS NOTES
Chief Complaint   Patient presents with    Post-Op Check     RIGHT TKR 10/22/20     History of present illness: The patient returns today after right total knee replacement performed on 10/22/2020. He is doing extremely well postoperatively. He has regained full range of motion and return to normal activity. He ambulates unassisted. Physical examination: The incision is well-healed with no drainage or signs of infection. Range of motion is 2 degrees of extension to 135 degrees of flexion. There is minimal swelling with no evidence of DVT and a negative Homans sign. Neurovascular exam is intact. Radiographs: X-rays taken today including AP, lateral, and skyline views of the right knee show excellent alignment of the prosthesis. No evidence of septic or aseptic loosening or periprosthetic fractures. Assessment/plan: We would like to continue home physical therapy exercises. He may gradually increase his activity as tolerated and is doing extremely well. We will see him back again in 3 months for follow-up. Teagan Daley, [facility]    This dictation was performed with a verbal recognition program [facility]) and it was checked for errors. It is possible that there are still dictated errors within this office note. If so, please bring any errors to my attention for an addendum. All efforts were made to ensure that this office note is accurate.

## 2020-11-30 NOTE — FLOWSHEET NOTE
[facility],  [address] [place]  Phone: [phone]  Fax [phone]    Physical Therapy Treatment Note/ Progress Report:           Date:  2020    Patient Name:  Rupinder Grigsby    :  1945  MRN: [id number]  Restrictions/Precautions:    Medical/Treatment Diagnosis Information:  Diagnosis: R knee pain M25.561 s/p R TKA 10/22/20             Treatment diagnosis: R knee pain M25.561; difficulty walking R26.2         Insurance/Certification information:   [facility]  [address]  Physician Information:  Referring Practitioner: Carolynn Taveras MD  Has the plan of care been signed (Y/N):        []  Yes  [x]  No     Date of Patient follow up with Physician:       Is this a Progress Report:     []  Yes  [x]  No        If Yes:  Date Range for reporting period:  Beginning 20  Ending    Progress report will be due (10 Rx or 30 days whichever is less): 97/3/91       Recertification will be due (POC Duration  / 90 days whichever is less): 21      Visit # Insurance Allowable Auth Required   In-person 6 BMN []  Yes []  No    Telehealth   []  Yes []  No    Total          Functional Scale: LEFS 51%    Date assessed:  20      Therapy Diagnosis/Practice Pattern:H      Number of Comorbidities:  []0     [x]1-2    []3+    Latex Allergy:  [x]NO      []YES  Preferred Language for Healthcare:   [x]English       []other:      Pain level:  1/10     SUBJECTIVE:  Pt reports that his knee feels good. Able to do just about anything he wants to now, but he can tell it's still weak.   OBJECTIVE:    Observation: tightness noted distal HS with STM   PROM R knee ext 0 deg with overpressure   Test measurements:  AROM R knee flexion 135 deg    RESTRICTIONS/PRECAUTIONS:     Exercises/Interventions:     Therapeutic Ex (34788) Sets/sec Reps Notes/CUES   QS     Gastroc Belt S 3x30\"     Table side HS S 3x30\"     Heel slide     LAQ with ADD      SLR      SL hip ABD     Bridge with ADD 2x10x5\"     SAQ 2# 2x10x5\"           TKE Red loop 25x5\"     Bike 5'     Pt ed: HEP, ice, POC 5'           Manual Intervention (22185)      Patellar mobs; STM HS; scar massage manual  HS S, modified Beto S,PROM R knee ext with  overpressure  18'                                   NMR re-education (35612)   CUES NEEDED   Independent gait  Cues for heel strike   Stairs with B rails           Standing HR/TR      Mini squat 2x10     Tandem stance airex L     FSU & over 6\" x15 R  Heel toe, slow ecc control   SLS airex 5x10\" R/L     LSD 4\" 2x10 R/L     Therapeutic Activity (67979)                                                Therapeutic Exercise and NMR EXR  [x] (19087) Provided verbal/tactile cueing for activities related to strengthening, flexibility, endurance, ROM for improvements in LE, proximal hip, and core control with self care, mobility, lifting, ambulation.  [] (95720) Provided verbal/tactile cueing for activities related to improving balance, coordination, kinesthetic sense, posture, motor skill, proprioception  to assist with LE, proximal hip, and core control in self care, mobility, lifting, ambulation and eccentric single leg control.      NMR and Therapeutic Activities:    [x] (10940 or 11895) Provided verbal/tactile cueing for activities related to improving balance, coordination, kinesthetic sense, posture, motor skill, proprioception and motor activation to allow for proper function of core, proximal hip and LE with self care and ADLs  [] (57983) Gait Re-education- Provided training and instruction to the patient for proper LE, core and proximal hip recruitment and positioning and eccentric body weight control with ambulation re-education including up and down stairs     Home Exercise Program:    [x] (35907) Reviewed/Progressed HEP activities related to strengthening, flexibility, endurance, ROM of core, proximal hip and LE for functional self-care, mobility, lifting and medical complications  [] Other:     ASSESSMENT: Pt achieved 135 deg knee flexion AROM today, which is an improvement from previous session. Initiated LSD today with mild ecc quad weakness noted. Pt is progressing well. Plan to initiate weight machines next week as swelling continues to decrease. Return to Play: (if applicable)   []  Stage 1: Intro to Strength   []  Stage 2: Return to Run and Strength   []  Stage 3: Return to Jump and Strength   []  Stage 4: Dynamic Strength and Agility   []  Stage 5: Sport Specific Training     []  Ready to Return to Play, Meets All Above Stages   []  Not Ready for Return to Sports   Comments:                               PLAN: See eval  [x] Continue per plan of care [] Alter current plan (see comments above)  [] Plan of care initiated [] Hold pending MD visit [] Discharge      Electronically signed by:  Sayra Raymundo PT     Note: If patient does not return for scheduled/ recommended follow up visits, this note will serve as a discharge from care along with most recent update on progress.

## 2020-12-07 NOTE — DISCHARGE SUMMARY
[facility], [address]  Phone: [phone]  Fax [phone]       Physical Therapy Discharge  Date: 2020        Patient Name:  Anushka Lowery    :  1945  MRN: [id number]  Referring Physician: Gilmar Luna  Diagnosis: R knee pain s/p R TKA                        ICD Code:M25.561  [x] Surgical [] Conservative   Therapy Diagnosis/Practice Pattern:H      Number of Comorbidities:  []0     [x]1-2    []3+  Total number of visits: 8   Reporting Period:   Beginning Date: 20   End Date:20    OBJECTIVE  Test used Initial score Discharge Score   Pain Summary VAS 5/10 0/10   Functional questionnaire LEFS 51% 20%   Functional Testing            ROM Knee flex  136 deg    ext  0 deg   Strength MMT knee flex, ext  5/5 ea    Hip ABD  5/5 B          Treatment to date:  [x] Therapeutic Exercise    [x] Modalities:  [] Therapeutic Activity             []Ultrasound            []Electrical Stimulation  [x] Gait Training     []Cervical Traction    [] Lumbar Traction  [x] Neuromuscular Re-education [x] Cold/hotpack         []Iontophoresis  [x] Instruction in HEP      Other:  [x] Manual Therapy                   []                                  []   Assessment:   [x] All Goals were achieved.  [] The following goals were achieved (#'s):   [] The following goals were not achieved for the following reasons:/assessmen of improvement as it relates to each goal:    Plan of Care:  [x] Discharge from Therapy Services due to:    Reason for Discharge:   [x] All goals achieved    [] Patient having surgery  [] Physician discontinued therapy  [] Insurance/Financial Limitations [] Patient did not return for follow up visits [] Home program/1 visit only   [] No subjective or objective improvement [] Plateaued   [] Patient was unable to adhere to the plan of care established at evaluation.  [] Referred back to physician for re-evaluation and did not return.      [] Other:       Electronically signed by:  Leticia Torres, DPT [id number]

## 2020-12-07 NOTE — FLOWSHEET NOTE
[facility],  [address] [place]  Phone: [phone]  Fax [phone]    Physical Therapy Treatment Note/ Progress Report:           Date:  2020    Patient Name:  Wolfgang Fenton    :  1945  MRN: [id number]  Restrictions/Precautions:    Medical/Treatment Diagnosis Information:  Diagnosis: R knee pain M25.561 s/p R TKA 10/22/20             Treatment diagnosis: R knee pain M25.561; difficulty walking R26.2         Insurance/Certification information:   [facility]  [address]  Physician Information:  Referring Practitioner: Tess Hunter MD  Has the plan of care been signed (Y/N):        []  Yes  [x]  No     Date of Patient follow up with Physician:       Is this a Progress Report:     [x]  Yes  []  No        If Yes:  Date Range for reporting period:  Beginning 20  Ending 12/3/20    Progress report will be due (10 Rx or 30 days whichever is less):        Recertification will be due (POC Duration  / 90 days whichever is less): 21      Visit # Insurance Allowable Auth Required   In-person 8 BMN []  Yes []  No    Telehealth   []  Yes []  No    Total          Functional Scale: LEFS 20%    Date assessed:  12/3/20      Therapy Diagnosis/Practice Pattern:H      Number of Comorbidities:  []0     [x]1-2    []3+    Latex Allergy:  [x]NO      []YES  Preferred Language for Healthcare:   [x]English       []other:      Pain level:  0/10     SUBJECTIVE:  Pt reports that his knee still feels really good. He's quite pleased.     OBJECTIVE: see DC note   Observation: decreased tightness noted distal HS with STM   Pt ed: continuation of HEP      Test measurements:  AROM R knee flexion 136 deg, ext 0 deg  HHD R knee ext 24.0, R hip ABD 20.0  HHD L knee ext 24.0, L hip ABD 18.0    RESTRICTIONS/PRECAUTIONS:     Exercises/Interventions:     Therapeutic Ex (74838) Sets/sec Reps Notes/CUES   QS     Gastroc Belt S     Table side HS S     Heel slide     LAQ with ADD      SLR      SL hip ABD     Bridge with ADD     SAQ           TKE Red loop 25x5\"     Bike 6'     Pt ed: HEP, ice, POC      Pt ed:continue HEP, focus on SLR and mini squats for strength, SLS for balance 5'     Manual Intervention (56749)      Patellar mobs; STM HS; scar massage manual  HS S, modified Beto S,PROM R knee ext with  overpressure  18'                                   NMR re-education (92932)   CUES NEEDED   Independent gait  Cues for heel strike   Stairs with B rails           Standing HR/TR      Mini squat 3x10     Tandem stance airex L     FSU & over 6\" x15 R  Heel toe, slow ecc control   SLS airex 5x15\" R/L     LSD 6\" 2x10 R/L     Therapeutic Activity (81057)                                                Therapeutic Exercise and NMR EXR  [x] (40659) Provided verbal/tactile cueing for activities related to strengthening, flexibility, endurance, ROM for improvements in LE, proximal hip, and core control with self care, mobility, lifting, ambulation.  [] (57578) Provided verbal/tactile cueing for activities related to improving balance, coordination, kinesthetic sense, posture, motor skill, proprioception  to assist with LE, proximal hip, and core control in self care, mobility, lifting, ambulation and eccentric single leg control.      NMR and Therapeutic Activities:    [x] (25506 or 04919) Provided verbal/tactile cueing for activities related to improving balance, coordination, kinesthetic sense, posture, motor skill, proprioception and motor activation to allow for proper function of core, proximal hip and LE with self care and ADLs  [] (89555) Gait Re-education- Provided training and instruction to the patient for proper LE, core and proximal hip recruitment and positioning and eccentric body weight control with ambulation re-education including up and down stairs     Home Exercise Program:    [x] (05361) Reviewed/Progressed HEP activities related to strengthening, flexibility, endurance, ROM of core, proximal hip and LE for functional self-care, mobility, lifting and ambulation/stair navigation   [] (28800)Reviewed/Progressed HEP activities related to improving balance, coordination, kinesthetic sense, posture, motor skill, proprioception of core, proximal hip and LE for self care, mobility, lifting, and ambulation/stair navigation      Manual Treatments:  PROM / STM / Oscillations-Mobs:  G-I, II, III, IV (PA's, Inf., Post.)  [x] (59793) Provided manual therapy to mobilize LE, proximal hip and/or LS spine soft tissue/joints for the purpose of modulating pain, promoting relaxation,  increasing ROM, reducing/eliminating soft tissue swelling/inflammation/restriction, improving soft tissue extensibility and allowing for proper ROM for normal function with self care, mobility, lifting and ambulation. Modalities:    [] GAME READY (VASO)- for significant edema, swelling, pain control. Charges:  Timed Code Treatment Minutes: 45   Total Treatment Minutes: 45     Jeb Sinha time in/time out:   (and requires time in and out for each CPT code)    [] EVAL (LOW) 52735   [] EVAL (MOD) 24588  [] EVAL (HIGH) 64067   [] RE-EVAL     [x] ML(93431) x1   [] IONTO  [x] NMR (72139) x 1  [] VASO   [x] Manual (86095) x1      [] Other:  [] TA x      [] Mech Traction (64923)  [] ES(attended) (48440)      [] ES (un) (63781):       GOALS:   Patient stated goal: full use of knee, return to work and golf  []? Progressing: []? Met: []? Not Met: []? Adjusted     Therapist goals for Patient:   Short Term Goals: To be achieved in: 2 weeks  1. Independent in HEP and progression per patient tolerance, in order to prevent re-injury. []? Progressing: [x]? Met: []? Not Met: []? Adjusted  2. Patient will have a decrease in pain to facilitate improvement in movement, function, and ADLs as indicated by Functional Deficits. []? Progressing: [x]? Met: []? Not Met: []? Adjusted     Long Term Goals:  To be achieved in: 12 weeks  1. Disability index score of 25% or less for the LEFS to assist with reaching prior level of function. []? Progressing: [x]? Met: []? Not Met: []? Adjusted  2. Patient will demonstrate increased AROM to 0-120 deg to allow for proper joint functioning as indicated by patients Functional Deficits. []? Progressing: [x]? Met: []? Not Met: []? Adjusted  3. Patient will demonstrate an increase in Strength to good quad tone and good proximal hip strength and control, within 5lb HHD in LE to allow for proper functional mobility as indicated by patients Functional Deficits. []? Progressing: [x]? Met: []? Not Met: []? Adjusted  4. Patient will return to negotiating stairs with reciprocal pattern without increased symptoms or restriction. []? Progressing: [x]? Met: []? Not Met: []? Adjusted  5. Patient will be able to return to work without restrictions. (patient specific functional goal)    []? Progressing: [x]? Met: []? Not Met: []? Adjusted         Progression Towards Functional goals:  [x] Patient is progressing as expected towards functional goals listed. [] Progression is slowed due to complexities listed. [] Progression has been slowed due to co-morbidities. [] Plan just implemented, too soon to assess goals progression  [] Other:         Overall Progression Towards Functional goals/ Treatment Progress Update:  [x] Patient is progressing as expected towards functional goals listed. [] Progression is slowed due to complexities/Impairments listed. [] Progression has been slowed due to co-morbidities.   [] Plan just implemented, too soon to assess goals progression <30days   [] Goals require adjustment due to lack of progress  [] Patient is not progressing as expected and requires additional follow up with physician  [] Other    Prognosis for POC: [x] Good [] Fair  [] Poor      Patient requires continued skilled intervention: [x] Yes  [] No    Treatment/Activity Tolerance:  [x] Patient able to complete treatment  [] Patient limited by fatigue  [] Patient limited by pain    [] Patient limited by other medical complications  [] Other:     ASSESSMENT: Pt has met all goals. See DC note. Return to Play: (if applicable)   []  Stage 1: Intro to Strength   []  Stage 2: Return to Run and Strength   []  Stage 3: Return to Jump and Strength   []  Stage 4: Dynamic Strength and Agility   []  Stage 5: Sport Specific Training     []  Ready to Return to Play, Meets All Above Stages   []  Not Ready for Return to Sports   Comments:                               PLAN: See eval  [] Continue per plan of care [] Alter current plan (see comments above)  [] Plan of care initiated [] Hold pending MD visit [x] Discharge      Electronically signed by:  Chad Rodriguez PT     Note: If patient does not return for scheduled/ recommended follow up visits, this note will serve as a discharge from care along with most recent update on progress.

## 2021-01-01 ENCOUNTER — OFFICE VISIT (OUTPATIENT)
Dept: FAMILY MEDICINE CLINIC | Age: 76
End: 2021-01-01
Payer: MEDICARE

## 2021-01-01 ENCOUNTER — TELEPHONE (OUTPATIENT)
Dept: SURGERY | Age: 76
End: 2021-01-01

## 2021-01-01 ENCOUNTER — HOSPITAL ENCOUNTER (OUTPATIENT)
Age: 76
Setting detail: OUTPATIENT SURGERY
Discharge: HOME | End: 2021-07-07
Attending: INTERNAL MEDICINE | Admitting: INTERNAL MEDICINE
Payer: MEDICARE

## 2021-01-01 ENCOUNTER — ANESTHESIA EVENT (OUTPATIENT)
Dept: OPERATING ROOM | Age: 76
End: 2021-01-01
Payer: MEDICARE

## 2021-01-01 ENCOUNTER — ANESTHESIA (OUTPATIENT)
Dept: ENDOSCOPY | Age: 76
End: 2021-01-01
Payer: MEDICARE

## 2021-01-01 ENCOUNTER — HOSPITAL ENCOUNTER (OUTPATIENT)
Dept: CT IMAGING | Age: 76
Discharge: HOME | End: 2021-07-22
Payer: MEDICARE

## 2021-01-01 ENCOUNTER — CARE COORDINATION (OUTPATIENT)
Dept: CASE MANAGEMENT | Age: 76
End: 2021-01-01

## 2021-01-01 ENCOUNTER — HOSPITAL ENCOUNTER (OUTPATIENT)
Age: 76
Setting detail: OUTPATIENT SURGERY
Discharge: STILL ADMITTED OR ADMINISTRATIVE | End: 2021-07-28
Attending: INTERNAL MEDICINE | Admitting: INTERNAL MEDICINE
Payer: MEDICARE

## 2021-01-01 ENCOUNTER — TELEPHONE (OUTPATIENT)
Dept: FAMILY MEDICINE CLINIC | Age: 76
End: 2021-01-01

## 2021-01-01 ENCOUNTER — ANESTHESIA (OUTPATIENT)
Dept: OPERATING ROOM | Age: 76
End: 2021-01-01
Payer: MEDICARE

## 2021-01-01 ENCOUNTER — ANESTHESIA EVENT (OUTPATIENT)
Dept: ENDOSCOPY | Age: 76
End: 2021-01-01
Payer: MEDICARE

## 2021-01-01 ENCOUNTER — HOSPITAL ENCOUNTER (OUTPATIENT)
Dept: GENERAL RADIOLOGY | Age: 76
Discharge: HOME | End: 2021-06-28
Payer: MEDICARE

## 2021-01-01 ENCOUNTER — APPOINTMENT (OUTPATIENT)
Dept: INTERVENTIONAL RADIOLOGY/VASCULAR | Age: 76
DRG: 444 | End: 2021-01-01
Payer: MEDICARE

## 2021-01-01 ENCOUNTER — HOSPITAL ENCOUNTER (OUTPATIENT)
Age: 76
Setting detail: OUTPATIENT SURGERY
Discharge: HOME | End: 2021-08-04
Attending: SURGERY | Admitting: SURGERY
Payer: MEDICARE

## 2021-01-01 ENCOUNTER — HOSPITAL ENCOUNTER (OUTPATIENT)
Age: 76
Setting detail: OBSERVATION
Discharge: HOME | End: 2021-07-29
Attending: INTERNAL MEDICINE | Admitting: INTERNAL MEDICINE
Payer: MEDICARE

## 2021-01-01 ENCOUNTER — APPOINTMENT (OUTPATIENT)
Dept: GENERAL RADIOLOGY | Age: 76
End: 2021-01-01
Attending: SURGERY
Payer: MEDICARE

## 2021-01-01 ENCOUNTER — APPOINTMENT (OUTPATIENT)
Dept: CT IMAGING | Age: 76
DRG: 444 | End: 2021-01-01
Payer: MEDICARE

## 2021-01-01 ENCOUNTER — HOSPITAL ENCOUNTER (OUTPATIENT)
Dept: CT IMAGING | Age: 76
Discharge: HOME | End: 2021-07-01
Payer: MEDICARE

## 2021-01-01 ENCOUNTER — APPOINTMENT (OUTPATIENT)
Dept: GENERAL RADIOLOGY | Age: 76
End: 2021-01-01
Attending: INTERNAL MEDICINE
Payer: MEDICARE

## 2021-01-01 ENCOUNTER — APPOINTMENT (OUTPATIENT)
Dept: GENERAL RADIOLOGY | Age: 76
DRG: 444 | End: 2021-01-01
Payer: MEDICARE

## 2021-01-01 ENCOUNTER — HOSPITAL ENCOUNTER (INPATIENT)
Age: 76
LOS: 2 days | Discharge: HOME HEALTH | DRG: 444 | End: 2021-08-11
Attending: EMERGENCY MEDICINE | Admitting: INTERNAL MEDICINE
Payer: MEDICARE

## 2021-01-01 ENCOUNTER — APPOINTMENT (OUTPATIENT)
Dept: INTERVENTIONAL RADIOLOGY/VASCULAR | Age: 76
End: 2021-01-01
Attending: INTERNAL MEDICINE
Payer: MEDICARE

## 2021-01-01 ENCOUNTER — TELEPHONE (OUTPATIENT)
Dept: CASE MANAGEMENT | Age: 76
End: 2021-01-01

## 2021-01-01 VITALS
RESPIRATION RATE: 14 BRPM | SYSTOLIC BLOOD PRESSURE: 136 MMHG | WEIGHT: 141 LBS | DIASTOLIC BLOOD PRESSURE: 66 MMHG | HEART RATE: 59 BPM | OXYGEN SATURATION: 98 % | HEIGHT: 66 IN | BODY MASS INDEX: 22.66 KG/M2

## 2021-01-01 VITALS
WEIGHT: 135.5 LBS | OXYGEN SATURATION: 97 % | RESPIRATION RATE: 16 BRPM | TEMPERATURE: 98.1 F | DIASTOLIC BLOOD PRESSURE: 79 MMHG | SYSTOLIC BLOOD PRESSURE: 146 MMHG | HEART RATE: 65 BPM | BODY MASS INDEX: 21.78 KG/M2 | HEIGHT: 66 IN

## 2021-01-01 VITALS
RESPIRATION RATE: 14 BRPM | WEIGHT: 149 LBS | BODY MASS INDEX: 23.95 KG/M2 | HEIGHT: 66 IN | HEART RATE: 68 BPM | DIASTOLIC BLOOD PRESSURE: 66 MMHG | OXYGEN SATURATION: 98 % | SYSTOLIC BLOOD PRESSURE: 114 MMHG

## 2021-01-01 VITALS
HEIGHT: 66 IN | DIASTOLIC BLOOD PRESSURE: 63 MMHG | WEIGHT: 130 LBS | TEMPERATURE: 98.1 F | RESPIRATION RATE: 16 BRPM | HEART RATE: 75 BPM | SYSTOLIC BLOOD PRESSURE: 130 MMHG | OXYGEN SATURATION: 96 % | BODY MASS INDEX: 20.89 KG/M2

## 2021-01-01 VITALS
SYSTOLIC BLOOD PRESSURE: 112 MMHG | DIASTOLIC BLOOD PRESSURE: 53 MMHG | OXYGEN SATURATION: 100 % | SYSTOLIC BLOOD PRESSURE: 105 MMHG | OXYGEN SATURATION: 99 % | DIASTOLIC BLOOD PRESSURE: 55 MMHG

## 2021-01-01 VITALS
OXYGEN SATURATION: 94 % | TEMPERATURE: 98.4 F | WEIGHT: 149 LBS | SYSTOLIC BLOOD PRESSURE: 138 MMHG | DIASTOLIC BLOOD PRESSURE: 78 MMHG | HEART RATE: 54 BPM | BODY MASS INDEX: 23.95 KG/M2 | RESPIRATION RATE: 14 BRPM | HEIGHT: 66 IN

## 2021-01-01 VITALS
BODY MASS INDEX: 22.02 KG/M2 | OXYGEN SATURATION: 93 % | TEMPERATURE: 97 F | RESPIRATION RATE: 14 BRPM | HEIGHT: 66 IN | WEIGHT: 137 LBS | DIASTOLIC BLOOD PRESSURE: 41 MMHG | SYSTOLIC BLOOD PRESSURE: 120 MMHG | HEART RATE: 60 BPM

## 2021-01-01 VITALS
BODY MASS INDEX: 23.14 KG/M2 | HEIGHT: 66 IN | DIASTOLIC BLOOD PRESSURE: 66 MMHG | WEIGHT: 144 LBS | HEART RATE: 68 BPM | OXYGEN SATURATION: 97 % | RESPIRATION RATE: 14 BRPM | SYSTOLIC BLOOD PRESSURE: 120 MMHG

## 2021-01-01 VITALS — DIASTOLIC BLOOD PRESSURE: 58 MMHG | SYSTOLIC BLOOD PRESSURE: 111 MMHG | OXYGEN SATURATION: 100 %

## 2021-01-01 VITALS
WEIGHT: 135 LBS | OXYGEN SATURATION: 97 % | HEART RATE: 75 BPM | DIASTOLIC BLOOD PRESSURE: 61 MMHG | SYSTOLIC BLOOD PRESSURE: 130 MMHG | HEIGHT: 66 IN | RESPIRATION RATE: 16 BRPM | BODY MASS INDEX: 21.69 KG/M2 | TEMPERATURE: 98 F

## 2021-01-01 VITALS
SYSTOLIC BLOOD PRESSURE: 119 MMHG | OXYGEN SATURATION: 94 % | RESPIRATION RATE: 16 BRPM | DIASTOLIC BLOOD PRESSURE: 69 MMHG | HEART RATE: 85 BPM | WEIGHT: 132.9 LBS | TEMPERATURE: 98 F | BODY MASS INDEX: 21.36 KG/M2 | HEIGHT: 66 IN

## 2021-01-01 VITALS — OXYGEN SATURATION: 100 % | DIASTOLIC BLOOD PRESSURE: 63 MMHG | SYSTOLIC BLOOD PRESSURE: 126 MMHG

## 2021-01-01 DIAGNOSIS — R79.89 ELEVATED LFTS: ICD-10-CM

## 2021-01-01 DIAGNOSIS — R63.4 WEIGHT LOSS: ICD-10-CM

## 2021-01-01 DIAGNOSIS — R17 TOTAL BILIRUBIN, ELEVATED: Primary | ICD-10-CM

## 2021-01-01 DIAGNOSIS — R39.9 LOWER URINARY TRACT SYMPTOMS (LUTS): ICD-10-CM

## 2021-01-01 DIAGNOSIS — I10 ESSENTIAL HYPERTENSION: ICD-10-CM

## 2021-01-01 DIAGNOSIS — K21.00 GASTROESOPHAGEAL REFLUX DISEASE WITH ESOPHAGITIS WITHOUT HEMORRHAGE: Primary | ICD-10-CM

## 2021-01-01 DIAGNOSIS — R50.9 FEVER, UNSPECIFIED FEVER CAUSE: Primary | ICD-10-CM

## 2021-01-01 DIAGNOSIS — K83.1 BILIARY OBSTRUCTION: Primary | ICD-10-CM

## 2021-01-01 DIAGNOSIS — C25.0 MALIGNANT NEOPLASM OF HEAD OF PANCREAS (HCC): ICD-10-CM

## 2021-01-01 DIAGNOSIS — L30.9 DERMATITIS: Primary | ICD-10-CM

## 2021-01-01 DIAGNOSIS — R10.10 PAIN OF UPPER ABDOMEN: Primary | ICD-10-CM

## 2021-01-01 DIAGNOSIS — R05.9 COUGH: ICD-10-CM

## 2021-01-01 DIAGNOSIS — R17 TOTAL BILIRUBIN, ELEVATED: ICD-10-CM

## 2021-01-01 DIAGNOSIS — Z00.00 ANNUAL PHYSICAL EXAM: Primary | ICD-10-CM

## 2021-01-01 DIAGNOSIS — R19.7 DIARRHEA, UNSPECIFIED TYPE: ICD-10-CM

## 2021-01-01 LAB
A/G RATIO: 0.7 (ref 1.1–2.2)
A/G RATIO: 0.8 (ref 1.1–2.2)
A/G RATIO: 0.8 (ref 1.1–2.2)
A/G RATIO: 0.9 (ref 1.1–2.2)
A/G RATIO: 0.9 (ref 1.1–2.2)
A/G RATIO: 1 (ref 1.1–2.2)
A/G RATIO: 1.3 (ref 1.1–2.2)
ALBUMIN SERPL-MCNC: 2.4 G/DL (ref 3.4–5)
ALBUMIN SERPL-MCNC: 2.7 G/DL (ref 3.4–5)
ALBUMIN SERPL-MCNC: 2.7 G/DL (ref 3.4–5)
ALBUMIN SERPL-MCNC: 2.9 G/DL (ref 3.4–5)
ALBUMIN SERPL-MCNC: 3 G/DL (ref 3.4–5)
ALBUMIN SERPL-MCNC: 3.3 G/DL (ref 3.4–5)
ALBUMIN SERPL-MCNC: 3.4 G/DL (ref 3.4–5)
ALBUMIN SERPL-MCNC: 4.1 G/DL (ref 3.4–5)
ALP BLD-CCNC: 286 U/L (ref 40–129)
ALP BLD-CCNC: 295 U/L (ref 40–129)
ALP BLD-CCNC: 334 U/L (ref 40–129)
ALP BLD-CCNC: 420 U/L (ref 40–129)
ALP BLD-CCNC: 609 U/L (ref 40–129)
ALP BLD-CCNC: 676 U/L (ref 40–129)
ALP BLD-CCNC: 689 U/L (ref 40–129)
ALP BLD-CCNC: 777 U/L (ref 40–129)
ALT SERPL-CCNC: 109 U/L (ref 10–40)
ALT SERPL-CCNC: 119 U/L (ref 10–40)
ALT SERPL-CCNC: 41 U/L (ref 10–40)
ALT SERPL-CCNC: 42 U/L (ref 10–40)
ALT SERPL-CCNC: 43 U/L (ref 10–40)
ALT SERPL-CCNC: 44 U/L (ref 10–40)
ALT SERPL-CCNC: 48 U/L (ref 10–40)
ALT SERPL-CCNC: 50 U/L (ref 10–40)
ANION GAP SERPL CALCULATED.3IONS-SCNC: 10 MMOL/L (ref 3–16)
ANION GAP SERPL CALCULATED.3IONS-SCNC: 12 MMOL/L (ref 3–16)
ANION GAP SERPL CALCULATED.3IONS-SCNC: 13 MMOL/L (ref 3–16)
ANION GAP SERPL CALCULATED.3IONS-SCNC: 13 MMOL/L (ref 3–16)
ANION GAP SERPL CALCULATED.3IONS-SCNC: 17 MMOL/L (ref 3–16)
ANION GAP SERPL CALCULATED.3IONS-SCNC: 8 MMOL/L (ref 3–16)
ANION GAP SERPL CALCULATED.3IONS-SCNC: 9 MMOL/L (ref 3–16)
ANISOCYTOSIS: ABNORMAL
AST SERPL-CCNC: 101 U/L (ref 15–37)
AST SERPL-CCNC: 104 U/L (ref 15–37)
AST SERPL-CCNC: 104 U/L (ref 15–37)
AST SERPL-CCNC: 107 U/L (ref 15–37)
AST SERPL-CCNC: 117 U/L (ref 15–37)
AST SERPL-CCNC: 135 U/L (ref 15–37)
AST SERPL-CCNC: 180 U/L (ref 15–37)
AST SERPL-CCNC: 197 U/L (ref 15–37)
ATYPICAL LYMPHOCYTE RELATIVE PERCENT: 3 % (ref 0–6)
ATYPICAL LYMPHOCYTE RELATIVE PERCENT: 8 % (ref 0–6)
BANDED NEUTROPHILS RELATIVE PERCENT: 19 % (ref 0–7)
BANDED NEUTROPHILS RELATIVE PERCENT: 26 % (ref 0–7)
BASOPHILS ABSOLUTE: 0 K/UL (ref 0–0.2)
BASOPHILS RELATIVE PERCENT: 0 %
BASOPHILS RELATIVE PERCENT: 0 %
BASOPHILS RELATIVE PERCENT: 0.3 %
BASOPHILS RELATIVE PERCENT: 0.4 %
BASOPHILS RELATIVE PERCENT: 0.5 %
BASOPHILS RELATIVE PERCENT: 1 %
BILIRUB SERPL-MCNC: 3 MG/DL (ref 0–1)
BILIRUB SERPL-MCNC: 3.4 MG/DL (ref 0–1)
BILIRUB SERPL-MCNC: 3.9 MG/DL (ref 0–1)
BILIRUB SERPL-MCNC: 6.1 MG/DL (ref 0–1)
BILIRUB SERPL-MCNC: 6.6 MG/DL (ref 0–1)
BILIRUB SERPL-MCNC: 6.7 MG/DL (ref 0–1)
BILIRUB SERPL-MCNC: 6.8 MG/DL (ref 0–1)
BILIRUB SERPL-MCNC: 6.9 MG/DL (ref 0–1)
BILIRUBIN DIRECT: 4.4 MG/DL (ref 0–0.3)
BILIRUBIN DIRECT: 5.2 MG/DL (ref 0–0.3)
BILIRUBIN DIRECT: 5.3 MG/DL (ref 0–0.3)
BILIRUBIN, INDIRECT: 1.6 MG/DL (ref 0–1)
BILIRUBIN, INDIRECT: 1.6 MG/DL (ref 0–1)
BILIRUBIN, INDIRECT: 2.2 MG/DL (ref 0–1)
BLOOD CULTURE, ROUTINE: NORMAL
BLOOD CULTURE, ROUTINE: NORMAL
BUN BLDV-MCNC: 14 MG/DL (ref 7–20)
BUN BLDV-MCNC: 15 MG/DL (ref 7–20)
BUN BLDV-MCNC: 16 MG/DL (ref 7–20)
BUN BLDV-MCNC: 16 MG/DL (ref 7–20)
BUN BLDV-MCNC: 17 MG/DL (ref 7–20)
BUN BLDV-MCNC: 17 MG/DL (ref 7–20)
BUN BLDV-MCNC: 18 MG/DL (ref 7–20)
BUN BLDV-MCNC: 20 MG/DL (ref 7–20)
BUN BLDV-MCNC: 25 MG/DL (ref 7–20)
BUN BLDV-MCNC: 27 MG/DL (ref 7–20)
CA 19-9: 2203 U/ML (ref 0–35)
CALCIUM SERPL-MCNC: 7.8 MG/DL (ref 8.3–10.6)
CALCIUM SERPL-MCNC: 7.9 MG/DL (ref 8.3–10.6)
CALCIUM SERPL-MCNC: 7.9 MG/DL (ref 8.3–10.6)
CALCIUM SERPL-MCNC: 8 MG/DL (ref 8.3–10.6)
CALCIUM SERPL-MCNC: 8.1 MG/DL (ref 8.3–10.6)
CALCIUM SERPL-MCNC: 8.2 MG/DL (ref 8.3–10.6)
CALCIUM SERPL-MCNC: 8.5 MG/DL (ref 8.3–10.6)
CALCIUM SERPL-MCNC: 8.8 MG/DL (ref 8.3–10.6)
CALCIUM SERPL-MCNC: 9.1 MG/DL (ref 8.3–10.6)
CALCIUM SERPL-MCNC: 9.2 MG/DL (ref 8.3–10.6)
CHLORIDE BLD-SCNC: 100 MMOL/L (ref 99–110)
CHLORIDE BLD-SCNC: 100 MMOL/L (ref 99–110)
CHLORIDE BLD-SCNC: 101 MMOL/L (ref 99–110)
CHLORIDE BLD-SCNC: 101 MMOL/L (ref 99–110)
CHLORIDE BLD-SCNC: 102 MMOL/L (ref 99–110)
CHLORIDE BLD-SCNC: 98 MMOL/L (ref 99–110)
CHLORIDE BLD-SCNC: 99 MMOL/L (ref 99–110)
CO2: 19 MMOL/L (ref 21–32)
CO2: 19 MMOL/L (ref 21–32)
CO2: 21 MMOL/L (ref 21–32)
CO2: 21 MMOL/L (ref 21–32)
CO2: 22 MMOL/L (ref 21–32)
CO2: 23 MMOL/L (ref 21–32)
CO2: 24 MMOL/L (ref 21–32)
CO2: 24 MMOL/L (ref 21–32)
CREAT SERPL-MCNC: 0.6 MG/DL (ref 0.8–1.3)
CREAT SERPL-MCNC: 0.7 MG/DL (ref 0.8–1.3)
CREAT SERPL-MCNC: 0.8 MG/DL (ref 0.8–1.3)
CREAT SERPL-MCNC: 0.9 MG/DL (ref 0.8–1.3)
CREAT SERPL-MCNC: 1 MG/DL (ref 0.8–1.3)
CREAT SERPL-MCNC: 1 MG/DL (ref 0.8–1.3)
CULTURE, BLOOD 2: ABNORMAL
CULTURE, BLOOD 2: ABNORMAL
DOHLE BODIES: PRESENT
EKG ATRIAL RATE: 73 BPM
EKG DIAGNOSIS: NORMAL
EKG P AXIS: 42 DEGREES
EKG P-R INTERVAL: 136 MS
EKG Q-T INTERVAL: 400 MS
EKG QRS DURATION: 76 MS
EKG QTC CALCULATION (BAZETT): 440 MS
EKG R AXIS: 34 DEGREES
EKG T AXIS: 34 DEGREES
EKG VENTRICULAR RATE: 73 BPM
EOSINOPHILS ABSOLUTE: 0 K/UL (ref 0–0.6)
EOSINOPHILS ABSOLUTE: 0.1 K/UL (ref 0–0.6)
EOSINOPHILS ABSOLUTE: 0.2 K/UL (ref 0–0.6)
EOSINOPHILS ABSOLUTE: 0.4 K/UL (ref 0–0.6)
EOSINOPHILS RELATIVE PERCENT: 0.3 %
EOSINOPHILS RELATIVE PERCENT: 0.4 %
EOSINOPHILS RELATIVE PERCENT: 0.4 %
EOSINOPHILS RELATIVE PERCENT: 1.2 %
EOSINOPHILS RELATIVE PERCENT: 1.3 %
EOSINOPHILS RELATIVE PERCENT: 2 %
EOSINOPHILS RELATIVE PERCENT: 2.3 %
EOSINOPHILS RELATIVE PERCENT: 3 %
GFR AFRICAN AMERICAN: >60
GFR NON-AFRICAN AMERICAN: >60
GLOBULIN: 3.1 G/DL
GLOBULIN: 3.3 G/DL
GLOBULIN: 3.4 G/DL
GLOBULIN: 3.5 G/DL
GLOBULIN: 3.6 G/DL
GLOBULIN: 3.8 G/DL
GLOBULIN: 3.9 G/DL
GLUCOSE BLD-MCNC: 108 MG/DL (ref 70–99)
GLUCOSE BLD-MCNC: 114 MG/DL (ref 70–99)
GLUCOSE BLD-MCNC: 118 MG/DL (ref 70–99)
GLUCOSE BLD-MCNC: 119 MG/DL (ref 70–99)
GLUCOSE BLD-MCNC: 126 MG/DL (ref 70–99)
GLUCOSE BLD-MCNC: 136 MG/DL (ref 70–99)
GLUCOSE BLD-MCNC: 147 MG/DL (ref 70–99)
GLUCOSE BLD-MCNC: 152 MG/DL (ref 70–99)
GLUCOSE BLD-MCNC: 161 MG/DL (ref 70–99)
GLUCOSE BLD-MCNC: 185 MG/DL (ref 70–99)
HCT VFR BLD CALC: 27.5 % (ref 40.5–52.5)
HCT VFR BLD CALC: 28.4 % (ref 40.5–52.5)
HCT VFR BLD CALC: 28.4 % (ref 40.5–52.5)
HCT VFR BLD CALC: 29.8 % (ref 40.5–52.5)
HCT VFR BLD CALC: 30.7 % (ref 40.5–52.5)
HCT VFR BLD CALC: 31.7 % (ref 40.5–52.5)
HCT VFR BLD CALC: 33.1 % (ref 40.5–52.5)
HCT VFR BLD CALC: 34.2 % (ref 40.5–52.5)
HCT VFR BLD CALC: 35.8 % (ref 40.5–52.5)
HCT VFR BLD CALC: 36 % (ref 40.5–52.5)
HCT VFR BLD CALC: 36.3 % (ref 40.5–52.5)
HCT VFR BLD CALC: 38.3 % (ref 40.5–52.5)
HEMATOLOGY PATH CONSULT: NO
HEMATOLOGY PATH CONSULT: NORMAL
HEMATOLOGY PATH CONSULT: NORMAL
HEMATOLOGY PATH CONSULT: YES
HEMATOLOGY PATH CONSULT: YES
HEMOGLOBIN: 10 G/DL (ref 13.5–17.5)
HEMOGLOBIN: 10 G/DL (ref 13.5–17.5)
HEMOGLOBIN: 10.5 G/DL (ref 13.5–17.5)
HEMOGLOBIN: 10.5 G/DL (ref 13.5–17.5)
HEMOGLOBIN: 11 G/DL (ref 13.5–17.5)
HEMOGLOBIN: 11.4 G/DL (ref 13.5–17.5)
HEMOGLOBIN: 11.7 G/DL (ref 13.5–17.5)
HEMOGLOBIN: 12.2 G/DL (ref 13.5–17.5)
HEMOGLOBIN: 12.3 G/DL (ref 13.5–17.5)
HEMOGLOBIN: 12.3 G/DL (ref 13.5–17.5)
HEMOGLOBIN: 13.2 G/DL (ref 13.5–17.5)
HEMOGLOBIN: 9.7 G/DL (ref 13.5–17.5)
INR BLD: 1.08 (ref 0.88–1.12)
INR BLD: 1.16 (ref 0.88–1.12)
LACTIC ACID, SEPSIS: 1.7 MMOL/L (ref 0.4–1.9)
LACTIC ACID, SEPSIS: 2 MMOL/L (ref 0.4–1.9)
LIPASE: 34 U/L (ref 13–60)
LIPASE: 40 U/L (ref 13–60)
LIPASE: 44 U/L (ref 13–60)
LIPASE: 46 U/L (ref 13–60)
LYMPHOCYTES ABSOLUTE: 0.4 K/UL (ref 1–5.1)
LYMPHOCYTES ABSOLUTE: 0.8 K/UL (ref 1–5.1)
LYMPHOCYTES ABSOLUTE: 0.9 K/UL (ref 1–5.1)
LYMPHOCYTES ABSOLUTE: 1.1 K/UL (ref 1–5.1)
LYMPHOCYTES ABSOLUTE: 1.2 K/UL (ref 1–5.1)
LYMPHOCYTES ABSOLUTE: 1.3 K/UL (ref 1–5.1)
LYMPHOCYTES ABSOLUTE: 1.7 K/UL (ref 1–5.1)
LYMPHOCYTES ABSOLUTE: 2 K/UL (ref 1–5.1)
LYMPHOCYTES RELATIVE PERCENT: 11 %
LYMPHOCYTES RELATIVE PERCENT: 11.9 %
LYMPHOCYTES RELATIVE PERCENT: 19.2 %
LYMPHOCYTES RELATIVE PERCENT: 21.2 %
LYMPHOCYTES RELATIVE PERCENT: 23.5 %
LYMPHOCYTES RELATIVE PERCENT: 24.7 %
LYMPHOCYTES RELATIVE PERCENT: 7 %
LYMPHOCYTES RELATIVE PERCENT: 7 %
MACROCYTES: ABNORMAL
MCH RBC QN AUTO: 31.3 PG (ref 26–34)
MCH RBC QN AUTO: 31.3 PG (ref 26–34)
MCH RBC QN AUTO: 31.5 PG (ref 26–34)
MCH RBC QN AUTO: 31.8 PG (ref 26–34)
MCH RBC QN AUTO: 31.9 PG (ref 26–34)
MCH RBC QN AUTO: 32 PG (ref 26–34)
MCH RBC QN AUTO: 32.1 PG (ref 26–34)
MCH RBC QN AUTO: 32.2 PG (ref 26–34)
MCH RBC QN AUTO: 32.5 PG (ref 26–34)
MCH RBC QN AUTO: 32.5 PG (ref 26–34)
MCH RBC QN AUTO: 32.8 PG (ref 26–34)
MCHC RBC AUTO-ENTMCNC: 33.8 G/DL (ref 31–36)
MCHC RBC AUTO-ENTMCNC: 34.2 G/DL (ref 31–36)
MCHC RBC AUTO-ENTMCNC: 34.3 G/DL (ref 31–36)
MCHC RBC AUTO-ENTMCNC: 34.3 G/DL (ref 31–36)
MCHC RBC AUTO-ENTMCNC: 34.4 G/DL (ref 31–36)
MCHC RBC AUTO-ENTMCNC: 34.4 G/DL (ref 31–36)
MCHC RBC AUTO-ENTMCNC: 34.7 G/DL (ref 31–36)
MCHC RBC AUTO-ENTMCNC: 35.2 G/DL (ref 31–36)
MCHC RBC AUTO-ENTMCNC: 35.3 G/DL (ref 31–36)
MCV RBC AUTO: 90.7 FL (ref 80–100)
MCV RBC AUTO: 90.8 FL (ref 80–100)
MCV RBC AUTO: 91.3 FL (ref 80–100)
MCV RBC AUTO: 91.5 FL (ref 80–100)
MCV RBC AUTO: 91.6 FL (ref 80–100)
MCV RBC AUTO: 92.3 FL (ref 80–100)
MCV RBC AUTO: 93 FL (ref 80–100)
MCV RBC AUTO: 93.4 FL (ref 80–100)
MCV RBC AUTO: 93.6 FL (ref 80–100)
MCV RBC AUTO: 93.7 FL (ref 80–100)
MCV RBC AUTO: 94.3 FL (ref 80–100)
METAMYELOCYTES RELATIVE PERCENT: 7 %
MONOCYTES ABSOLUTE: 0 K/UL (ref 0–1.3)
MONOCYTES ABSOLUTE: 0.1 K/UL (ref 0–1.3)
MONOCYTES ABSOLUTE: 0.2 K/UL (ref 0–1.3)
MONOCYTES ABSOLUTE: 0.5 K/UL (ref 0–1.3)
MONOCYTES ABSOLUTE: 0.6 K/UL (ref 0–1.3)
MONOCYTES ABSOLUTE: 0.7 K/UL (ref 0–1.3)
MONOCYTES ABSOLUTE: 0.8 K/UL (ref 0–1.3)
MONOCYTES ABSOLUTE: 2.2 K/UL (ref 0–1.3)
MONOCYTES RELATIVE PERCENT: 1 %
MONOCYTES RELATIVE PERCENT: 1.3 %
MONOCYTES RELATIVE PERCENT: 11 %
MONOCYTES RELATIVE PERCENT: 13 %
MONOCYTES RELATIVE PERCENT: 2.7 %
MONOCYTES RELATIVE PERCENT: 8.4 %
MONOCYTES RELATIVE PERCENT: 9.1 %
MONOCYTES RELATIVE PERCENT: 9.4 %
MYELOCYTE PERCENT: 15 %
MYELOCYTE PERCENT: 4 %
NEUTROPHILS ABSOLUTE: 1.3 K/UL (ref 1.7–7.7)
NEUTROPHILS ABSOLUTE: 10.7 K/UL (ref 1.7–7.7)
NEUTROPHILS ABSOLUTE: 15.2 K/UL (ref 1.7–7.7)
NEUTROPHILS ABSOLUTE: 3.7 K/UL (ref 1.7–7.7)
NEUTROPHILS ABSOLUTE: 3.8 K/UL (ref 1.7–7.7)
NEUTROPHILS ABSOLUTE: 4.7 K/UL (ref 1.7–7.7)
NEUTROPHILS ABSOLUTE: 5.4 K/UL (ref 1.7–7.7)
NEUTROPHILS ABSOLUTE: 5.8 K/UL (ref 1.7–7.7)
NEUTROPHILS RELATIVE PERCENT: 35 %
NEUTROPHILS RELATIVE PERCENT: 36 %
NEUTROPHILS RELATIVE PERCENT: 65.7 %
NEUTROPHILS RELATIVE PERCENT: 68.5 %
NEUTROPHILS RELATIVE PERCENT: 69.3 %
NEUTROPHILS RELATIVE PERCENT: 78.1 %
NEUTROPHILS RELATIVE PERCENT: 78.9 %
NEUTROPHILS RELATIVE PERCENT: 91 %
ORGANISM: ABNORMAL
ORGANISM: ABNORMAL
OVALOCYTES: ABNORMAL
PDW BLD-RTO: 14.7 % (ref 12.4–15.4)
PDW BLD-RTO: 15.4 % (ref 12.4–15.4)
PDW BLD-RTO: 15.9 % (ref 12.4–15.4)
PDW BLD-RTO: 15.9 % (ref 12.4–15.4)
PDW BLD-RTO: 16 % (ref 12.4–15.4)
PDW BLD-RTO: 16.1 % (ref 12.4–15.4)
PDW BLD-RTO: 16.4 % (ref 12.4–15.4)
PDW BLD-RTO: 16.6 % (ref 12.4–15.4)
PDW BLD-RTO: 16.9 % (ref 12.4–15.4)
PLATELET # BLD: 109 K/UL (ref 135–450)
PLATELET # BLD: 125 K/UL (ref 135–450)
PLATELET # BLD: 128 K/UL (ref 135–450)
PLATELET # BLD: 135 K/UL (ref 135–450)
PLATELET # BLD: 146 K/UL (ref 135–450)
PLATELET # BLD: 152 K/UL (ref 135–450)
PLATELET # BLD: 61 K/UL (ref 135–450)
PLATELET # BLD: 65 K/UL (ref 135–450)
PLATELET # BLD: 74 K/UL (ref 135–450)
PLATELET # BLD: 82 K/UL (ref 135–450)
PLATELET # BLD: 92 K/UL (ref 135–450)
PLATELET SLIDE REVIEW: ABNORMAL
PLATELET SLIDE REVIEW: ABNORMAL
PMV BLD AUTO: 8.8 FL (ref 5–10.5)
PMV BLD AUTO: 8.9 FL (ref 5–10.5)
PMV BLD AUTO: 9.1 FL (ref 5–10.5)
PMV BLD AUTO: 9.2 FL (ref 5–10.5)
PMV BLD AUTO: 9.5 FL (ref 5–10.5)
PMV BLD AUTO: 9.7 FL (ref 5–10.5)
PMV BLD AUTO: 9.7 FL (ref 5–10.5)
POIKILOCYTES: ABNORMAL
POLYCHROMASIA: ABNORMAL
POTASSIUM REFLEX MAGNESIUM: 3.7 MMOL/L (ref 3.5–5.1)
POTASSIUM REFLEX MAGNESIUM: 3.8 MMOL/L (ref 3.5–5.1)
POTASSIUM REFLEX MAGNESIUM: 3.9 MMOL/L (ref 3.5–5.1)
POTASSIUM REFLEX MAGNESIUM: 4 MMOL/L (ref 3.5–5.1)
POTASSIUM REFLEX MAGNESIUM: 4 MMOL/L (ref 3.5–5.1)
POTASSIUM REFLEX MAGNESIUM: 4.6 MMOL/L (ref 3.5–5.1)
POTASSIUM SERPL-SCNC: 3.6 MMOL/L (ref 3.5–5.1)
POTASSIUM SERPL-SCNC: 3.7 MMOL/L (ref 3.5–5.1)
POTASSIUM SERPL-SCNC: 4 MMOL/L (ref 3.5–5.1)
POTASSIUM SERPL-SCNC: 4 MMOL/L (ref 3.5–5.1)
PROCALCITONIN: 0.98 NG/ML (ref 0–0.15)
PROTHROMBIN TIME: 12.2 SEC (ref 9.9–12.7)
PROTHROMBIN TIME: 13.2 SEC (ref 9.9–12.7)
RBC # BLD: 3.03 M/UL (ref 4.2–5.9)
RBC # BLD: 3.05 M/UL (ref 4.2–5.9)
RBC # BLD: 3.08 M/UL (ref 4.2–5.9)
RBC # BLD: 3.28 M/UL (ref 4.2–5.9)
RBC # BLD: 3.28 M/UL (ref 4.2–5.9)
RBC # BLD: 3.4 M/UL (ref 4.2–5.9)
RBC # BLD: 3.53 M/UL (ref 4.2–5.9)
RBC # BLD: 3.75 M/UL (ref 4.2–5.9)
RBC # BLD: 3.85 M/UL (ref 4.2–5.9)
RBC # BLD: 3.91 M/UL (ref 4.2–5.9)
RBC # BLD: 4.18 M/UL (ref 4.2–5.9)
RBC # BLD: NORMAL 10*6/UL
REPORT: NORMAL
SLIDE REVIEW: ABNORMAL
SLIDE REVIEW: ABNORMAL
SODIUM BLD-SCNC: 130 MMOL/L (ref 136–145)
SODIUM BLD-SCNC: 131 MMOL/L (ref 136–145)
SODIUM BLD-SCNC: 132 MMOL/L (ref 136–145)
SODIUM BLD-SCNC: 132 MMOL/L (ref 136–145)
SODIUM BLD-SCNC: 133 MMOL/L (ref 136–145)
SODIUM BLD-SCNC: 134 MMOL/L (ref 136–145)
SODIUM BLD-SCNC: 134 MMOL/L (ref 136–145)
SODIUM BLD-SCNC: 138 MMOL/L (ref 136–145)
TOTAL PROTEIN: 6 G/DL (ref 6.4–8.2)
TOTAL PROTEIN: 6 G/DL (ref 6.4–8.2)
TOTAL PROTEIN: 6.5 G/DL (ref 6.4–8.2)
TOTAL PROTEIN: 6.6 G/DL (ref 6.4–8.2)
TOTAL PROTEIN: 6.7 G/DL (ref 6.4–8.2)
TOTAL PROTEIN: 6.8 G/DL (ref 6.4–8.2)
TOTAL PROTEIN: 6.9 G/DL (ref 6.4–8.2)
TOTAL PROTEIN: 7.2 G/DL (ref 6.4–8.2)
TOXIC GRANULATION: PRESENT
TOXIC GRANULATION: PRESENT
TSH REFLEX: 2.64 UIU/ML (ref 0.27–4.2)
WBC # BLD: 1.8 K/UL (ref 4–11)
WBC # BLD: 11.8 K/UL (ref 4–11)
WBC # BLD: 19.7 K/UL (ref 4–11)
WBC # BLD: 4.3 K/UL (ref 4–11)
WBC # BLD: 4.5 K/UL (ref 4–11)
WBC # BLD: 5.6 K/UL (ref 4–11)
WBC # BLD: 5.8 K/UL (ref 4–11)
WBC # BLD: 6 K/UL (ref 4–11)
WBC # BLD: 7.3 K/UL (ref 4–11)
WBC # BLD: 7.3 K/UL (ref 4–11)
WBC # BLD: 7.9 K/UL (ref 4–11)

## 2021-01-01 PROCEDURE — 6360000002 HC RX W HCPCS: Performed by: ANESTHESIOLOGY

## 2021-01-01 PROCEDURE — 6360000002 HC RX W HCPCS: Performed by: HOSPITALIST

## 2021-01-01 PROCEDURE — 96372 THER/PROPH/DIAG INJ SC/IM: CPT

## 2021-01-01 PROCEDURE — 97161 PT EVAL LOW COMPLEX 20 MIN: CPT

## 2021-01-01 PROCEDURE — 6360000004 HC RX CONTRAST MEDICATION: Performed by: EMERGENCY MEDICINE

## 2021-01-01 PROCEDURE — 80053 COMPREHEN METABOLIC PANEL: CPT

## 2021-01-01 PROCEDURE — 80048 BASIC METABOLIC PNL TOTAL CA: CPT

## 2021-01-01 PROCEDURE — 6360000002 HC RX W HCPCS: Performed by: SURGERY

## 2021-01-01 PROCEDURE — 7100000011 HC PHASE II RECOVERY - ADDTL 15 MIN: Performed by: INTERNAL MEDICINE

## 2021-01-01 PROCEDURE — 3609014900 HC ERCP W/SPHINCTEROTOMY &/OR PAPILLOTOMY: Performed by: INTERNAL MEDICINE

## 2021-01-01 PROCEDURE — 85025 COMPLETE CBC W/AUTO DIFF WBC: CPT

## 2021-01-01 PROCEDURE — 2709999900 HC NON-CHARGEABLE SUPPLY: Performed by: INTERNAL MEDICINE

## 2021-01-01 PROCEDURE — 2580000003 HC RX 258: Performed by: INTERNAL MEDICINE

## 2021-01-01 PROCEDURE — 6360000002 HC RX W HCPCS: Performed by: INTERNAL MEDICINE

## 2021-01-01 PROCEDURE — 85027 COMPLETE CBC AUTOMATED: CPT

## 2021-01-01 PROCEDURE — 82247 BILIRUBIN TOTAL: CPT

## 2021-01-01 PROCEDURE — 47490 INCISION OF GALLBLADDER: CPT

## 2021-01-01 PROCEDURE — 6370000000 HC RX 637 (ALT 250 FOR IP): Performed by: NURSE PRACTITIONER

## 2021-01-01 PROCEDURE — 2709999900 HC NON-CHARGEABLE SUPPLY: Performed by: SURGERY

## 2021-01-01 PROCEDURE — 2580000003 HC RX 258: Performed by: NURSE ANESTHETIST, CERTIFIED REGISTERED

## 2021-01-01 PROCEDURE — 86301 IMMUNOASSAY TUMOR CA 19-9: CPT

## 2021-01-01 PROCEDURE — 2500000003 HC RX 250 WO HCPCS: Performed by: NURSE ANESTHETIST, CERTIFIED REGISTERED

## 2021-01-01 PROCEDURE — 3700000000 HC ANESTHESIA ATTENDED CARE: Performed by: INTERNAL MEDICINE

## 2021-01-01 PROCEDURE — 77001 FLUOROGUIDE FOR VEIN DEVICE: CPT | Performed by: SURGERY

## 2021-01-01 PROCEDURE — 2580000003 HC RX 258: Performed by: SURGERY

## 2021-01-01 PROCEDURE — 6360000002 HC RX W HCPCS: Performed by: NURSE ANESTHETIST, CERTIFIED REGISTERED

## 2021-01-01 PROCEDURE — 3609012400 HC EGD TRANSORAL BIOPSY SINGLE/MULTIPLE: Performed by: INTERNAL MEDICINE

## 2021-01-01 PROCEDURE — 7100000001 HC PACU RECOVERY - ADDTL 15 MIN: Performed by: RADIOLOGY

## 2021-01-01 PROCEDURE — 3288F FALL RISK ASSESSMENT DOCD: CPT | Performed by: FAMILY MEDICINE

## 2021-01-01 PROCEDURE — 3700000000 HC ANESTHESIA ATTENDED CARE: Performed by: SURGERY

## 2021-01-01 PROCEDURE — 83605 ASSAY OF LACTIC ACID: CPT

## 2021-01-01 PROCEDURE — 83690 ASSAY OF LIPASE: CPT

## 2021-01-01 PROCEDURE — G8427 DOCREV CUR MEDS BY ELIG CLIN: HCPCS | Performed by: FAMILY MEDICINE

## 2021-01-01 PROCEDURE — 87040 BLOOD CULTURE FOR BACTERIA: CPT

## 2021-01-01 PROCEDURE — 4040F PNEUMOC VAC/ADMIN/RCVD: CPT | Performed by: FAMILY MEDICINE

## 2021-01-01 PROCEDURE — 88173 CYTOPATH EVAL FNA REPORT: CPT

## 2021-01-01 PROCEDURE — 76937 US GUIDE VASCULAR ACCESS: CPT | Performed by: SURGERY

## 2021-01-01 PROCEDURE — G0378 HOSPITAL OBSERVATION PER HR: HCPCS

## 2021-01-01 PROCEDURE — 3700000001 HC ADD 15 MINUTES (ANESTHESIA): Performed by: INTERNAL MEDICINE

## 2021-01-01 PROCEDURE — 85610 PROTHROMBIN TIME: CPT

## 2021-01-01 PROCEDURE — C1788 PORT, INDWELLING, IMP: HCPCS | Performed by: SURGERY

## 2021-01-01 PROCEDURE — 96366 THER/PROPH/DIAG IV INF ADDON: CPT

## 2021-01-01 PROCEDURE — 1036F TOBACCO NON-USER: CPT | Performed by: FAMILY MEDICINE

## 2021-01-01 PROCEDURE — 71046 X-RAY EXAM CHEST 2 VIEWS: CPT

## 2021-01-01 PROCEDURE — 2580000003 HC RX 258: Performed by: ANESTHESIOLOGY

## 2021-01-01 PROCEDURE — 05HY33Z INSERTION OF INFUSION DEVICE INTO UPPER VEIN, PERCUTANEOUS APPROACH: ICD-10-PCS | Performed by: INTERNAL MEDICINE

## 2021-01-01 PROCEDURE — 74177 CT ABD & PELVIS W/CONTRAST: CPT

## 2021-01-01 PROCEDURE — 2500000003 HC RX 250 WO HCPCS: Performed by: SURGERY

## 2021-01-01 PROCEDURE — 36415 COLL VENOUS BLD VENIPUNCTURE: CPT

## 2021-01-01 PROCEDURE — 2580000003 HC RX 258: Performed by: PHYSICIAN ASSISTANT

## 2021-01-01 PROCEDURE — 6360000004 HC RX CONTRAST MEDICATION: Performed by: INTERNAL MEDICINE

## 2021-01-01 PROCEDURE — 3609018500 HC EGD US SCOPE W/ADJACENT STRUCTURES: Performed by: INTERNAL MEDICINE

## 2021-01-01 PROCEDURE — 1200000000 HC SEMI PRIVATE

## 2021-01-01 PROCEDURE — 36561 INSERT TUNNELED CV CATH: CPT | Performed by: SURGERY

## 2021-01-01 PROCEDURE — 93005 ELECTROCARDIOGRAM TRACING: CPT | Performed by: ANESTHESIOLOGY

## 2021-01-01 PROCEDURE — G8484 FLU IMMUNIZE NO ADMIN: HCPCS | Performed by: FAMILY MEDICINE

## 2021-01-01 PROCEDURE — 84145 PROCALCITONIN (PCT): CPT

## 2021-01-01 PROCEDURE — 96375 TX/PRO/DX INJ NEW DRUG ADDON: CPT

## 2021-01-01 PROCEDURE — 99397 PER PM REEVAL EST PAT 65+ YR: CPT | Performed by: FAMILY MEDICINE

## 2021-01-01 PROCEDURE — 3609015100 HC ERCP STENT PLACEMENT BILIARY/PANCREATIC DUCT: Performed by: INTERNAL MEDICINE

## 2021-01-01 PROCEDURE — 7100000011 HC PHASE II RECOVERY - ADDTL 15 MIN: Performed by: SURGERY

## 2021-01-01 PROCEDURE — 6360000002 HC RX W HCPCS: Performed by: PHYSICIAN ASSISTANT

## 2021-01-01 PROCEDURE — 6370000000 HC RX 637 (ALT 250 FOR IP): Performed by: INTERNAL MEDICINE

## 2021-01-01 PROCEDURE — G8420 CALC BMI NORM PARAMETERS: HCPCS | Performed by: FAMILY MEDICINE

## 2021-01-01 PROCEDURE — 6360000004 HC RX CONTRAST MEDICATION: Performed by: FAMILY MEDICINE

## 2021-01-01 PROCEDURE — 7100000010 HC PHASE II RECOVERY - FIRST 15 MIN: Performed by: INTERNAL MEDICINE

## 2021-01-01 PROCEDURE — 71250 CT THORAX DX C-: CPT

## 2021-01-01 PROCEDURE — 82248 BILIRUBIN DIRECT: CPT

## 2021-01-01 PROCEDURE — 97535 SELF CARE MNGMENT TRAINING: CPT

## 2021-01-01 PROCEDURE — 87186 SC STD MICRODIL/AGAR DIL: CPT

## 2021-01-01 PROCEDURE — 6370000000 HC RX 637 (ALT 250 FOR IP): Performed by: HOSPITALIST

## 2021-01-01 PROCEDURE — 1123F ACP DISCUSS/DSCN MKR DOCD: CPT | Performed by: FAMILY MEDICINE

## 2021-01-01 PROCEDURE — 80076 HEPATIC FUNCTION PANEL: CPT

## 2021-01-01 PROCEDURE — 3017F COLORECTAL CA SCREEN DOC REV: CPT | Performed by: FAMILY MEDICINE

## 2021-01-01 PROCEDURE — 3209999900 FLUORO FOR SURGICAL PROCEDURES

## 2021-01-01 PROCEDURE — G0379 DIRECT REFER HOSPITAL OBSERV: HCPCS

## 2021-01-01 PROCEDURE — 0F997ZZ DRAINAGE OF COMMON BILE DUCT, VIA NATURAL OR ARTIFICIAL OPENING: ICD-10-PCS | Performed by: RADIOLOGY

## 2021-01-01 PROCEDURE — 6360000004 HC RX CONTRAST MEDICATION: Performed by: RADIOLOGY

## 2021-01-01 PROCEDURE — 77001 FLUOROGUIDE FOR VEIN DEVICE: CPT

## 2021-01-01 PROCEDURE — 7100000000 HC PACU RECOVERY - FIRST 15 MIN: Performed by: INTERNAL MEDICINE

## 2021-01-01 PROCEDURE — 7100000000 HC PACU RECOVERY - FIRST 15 MIN: Performed by: RADIOLOGY

## 2021-01-01 PROCEDURE — 6370000000 HC RX 637 (ALT 250 FOR IP): Performed by: PHYSICIAN ASSISTANT

## 2021-01-01 PROCEDURE — 3609015200 HC ERCP REMOVE CALCULI/DEBRIS BILIARY/PANCREAS DUCT: Performed by: INTERNAL MEDICINE

## 2021-01-01 PROCEDURE — C1876 STENT, NON-COA/NON-COV W/DEL: HCPCS | Performed by: INTERNAL MEDICINE

## 2021-01-01 PROCEDURE — 0F797DZ DILATION OF COMMON BILE DUCT WITH INTRALUMINAL DEVICE, VIA NATURAL OR ARTIFICIAL OPENING: ICD-10-PCS | Performed by: RADIOLOGY

## 2021-01-01 PROCEDURE — 88172 CYTP DX EVAL FNA 1ST EA SITE: CPT

## 2021-01-01 PROCEDURE — C1894 INTRO/SHEATH, NON-LASER: HCPCS

## 2021-01-01 PROCEDURE — 99213 OFFICE O/P EST LOW 20 MIN: CPT | Performed by: FAMILY MEDICINE

## 2021-01-01 PROCEDURE — 93010 ELECTROCARDIOGRAM REPORT: CPT | Performed by: INTERNAL MEDICINE

## 2021-01-01 PROCEDURE — 6360000002 HC RX W HCPCS: Performed by: NURSE PRACTITIONER

## 2021-01-01 PROCEDURE — 3600000014 HC SURGERY LEVEL 4 ADDTL 15MIN: Performed by: SURGERY

## 2021-01-01 PROCEDURE — 7100000010 HC PHASE II RECOVERY - FIRST 15 MIN: Performed by: SURGERY

## 2021-01-01 PROCEDURE — 97530 THERAPEUTIC ACTIVITIES: CPT

## 2021-01-01 PROCEDURE — 99283 EMERGENCY DEPT VISIT LOW MDM: CPT

## 2021-01-01 PROCEDURE — 96365 THER/PROPH/DIAG IV INF INIT: CPT

## 2021-01-01 PROCEDURE — 3600000004 HC SURGERY LEVEL 4 BASE: Performed by: SURGERY

## 2021-01-01 PROCEDURE — 74330 X-RAY BILE/PANC ENDOSCOPY: CPT

## 2021-01-01 PROCEDURE — 88305 TISSUE EXAM BY PATHOLOGIST: CPT

## 2021-01-01 PROCEDURE — 3700000001 HC ADD 15 MINUTES (ANESTHESIA): Performed by: SURGERY

## 2021-01-01 PROCEDURE — 7100000001 HC PACU RECOVERY - ADDTL 15 MIN: Performed by: INTERNAL MEDICINE

## 2021-01-01 PROCEDURE — 1111F DSCHRG MED/CURRENT MED MERGE: CPT | Performed by: FAMILY MEDICINE

## 2021-01-01 PROCEDURE — 47531 INJECTION FOR CHOLANGIOGRAM: CPT

## 2021-01-01 PROCEDURE — 85014 HEMATOCRIT: CPT

## 2021-01-01 PROCEDURE — 87150 DNA/RNA AMPLIFIED PROBE: CPT

## 2021-01-01 PROCEDURE — 99222 1ST HOSP IP/OBS MODERATE 55: CPT | Performed by: INTERNAL MEDICINE

## 2021-01-01 PROCEDURE — 99217 PR OBSERVATION CARE DISCHARGE MANAGEMENT: CPT | Performed by: INTERNAL MEDICINE

## 2021-01-01 PROCEDURE — 85018 HEMOGLOBIN: CPT

## 2021-01-01 PROCEDURE — 97165 OT EVAL LOW COMPLEX 30 MIN: CPT

## 2021-01-01 DEVICE — STENT SYSTEM
Type: IMPLANTABLE DEVICE | Status: FUNCTIONAL
Brand: WALLFLEX™ BILIARY

## 2021-01-01 DEVICE — PORT INFUS OD2.7MM ID1.5MM INTRO 8FR TI POLYUR CATH DETACH H787CT80STPD0] ANGIODYNAMICS INC]: Type: IMPLANTABLE DEVICE | Site: CHEST | Status: FUNCTIONAL

## 2021-01-01 RX ORDER — POLYETHYLENE GLYCOL 3350 17 G/17G
17 POWDER, FOR SOLUTION ORAL DAILY PRN
Status: DISCONTINUED | OUTPATIENT
Start: 2021-01-01 | End: 2021-01-01 | Stop reason: HOSPADM

## 2021-01-01 RX ORDER — PROMETHAZINE HYDROCHLORIDE 25 MG/ML
6.25 INJECTION, SOLUTION INTRAMUSCULAR; INTRAVENOUS
Status: DISCONTINUED | OUTPATIENT
Start: 2021-01-01 | End: 2021-01-01 | Stop reason: HOSPADM

## 2021-01-01 RX ORDER — MAGNESIUM SULFATE IN WATER 40 MG/ML
2000 INJECTION, SOLUTION INTRAVENOUS PRN
Status: DISCONTINUED | OUTPATIENT
Start: 2021-01-01 | End: 2021-01-01 | Stop reason: HOSPADM

## 2021-01-01 RX ORDER — PROPOFOL 10 MG/ML
INJECTION, EMULSION INTRAVENOUS PRN
Status: DISCONTINUED | OUTPATIENT
Start: 2021-01-01 | End: 2021-01-01 | Stop reason: SDUPTHER

## 2021-01-01 RX ORDER — SODIUM CHLORIDE 0.9 % (FLUSH) 0.9 %
5-40 SYRINGE (ML) INJECTION EVERY 12 HOURS SCHEDULED
Status: DISCONTINUED | OUTPATIENT
Start: 2021-01-01 | End: 2021-01-01 | Stop reason: HOSPADM

## 2021-01-01 RX ORDER — OXYCODONE HYDROCHLORIDE 5 MG/1
5 TABLET ORAL EVERY 4 HOURS PRN
Status: DISCONTINUED | OUTPATIENT
Start: 2021-01-01 | End: 2021-01-01 | Stop reason: HOSPADM

## 2021-01-01 RX ORDER — SILDENAFIL 100 MG/1
TABLET, FILM COATED ORAL
Qty: 6 TABLET | Refills: 1 | Status: SHIPPED | OUTPATIENT
Start: 2021-01-01 | End: 2021-01-01

## 2021-01-01 RX ORDER — TAMSULOSIN HYDROCHLORIDE 0.4 MG/1
0.4 CAPSULE ORAL DAILY
Status: DISCONTINUED | OUTPATIENT
Start: 2021-01-01 | End: 2021-01-01 | Stop reason: HOSPADM

## 2021-01-01 RX ORDER — ACETAMINOPHEN 650 MG/1
650 SUPPOSITORY RECTAL EVERY 6 HOURS PRN
Status: DISCONTINUED | OUTPATIENT
Start: 2021-01-01 | End: 2021-01-01 | Stop reason: HOSPADM

## 2021-01-01 RX ORDER — ONDANSETRON 4 MG/1
4 TABLET, ORALLY DISINTEGRATING ORAL EVERY 8 HOURS PRN
Status: DISCONTINUED | OUTPATIENT
Start: 2021-01-01 | End: 2021-01-01 | Stop reason: HOSPADM

## 2021-01-01 RX ORDER — LIDOCAINE HYDROCHLORIDE 10 MG/ML
INJECTION, SOLUTION INFILTRATION; PERINEURAL PRN
Status: DISCONTINUED | OUTPATIENT
Start: 2021-01-01 | End: 2021-01-01 | Stop reason: SDUPTHER

## 2021-01-01 RX ORDER — ROCURONIUM BROMIDE 10 MG/ML
INJECTION, SOLUTION INTRAVENOUS PRN
Status: DISCONTINUED | OUTPATIENT
Start: 2021-01-01 | End: 2021-01-01 | Stop reason: SDUPTHER

## 2021-01-01 RX ORDER — FENTANYL CITRATE 50 UG/ML
25 INJECTION, SOLUTION INTRAMUSCULAR; INTRAVENOUS EVERY 5 MIN PRN
Status: DISCONTINUED | OUTPATIENT
Start: 2021-01-01 | End: 2021-01-01 | Stop reason: HOSPADM

## 2021-01-01 RX ORDER — OXYCODONE HYDROCHLORIDE AND ACETAMINOPHEN 5; 325 MG/1; MG/1
1 TABLET ORAL PRN
Status: DISCONTINUED | OUTPATIENT
Start: 2021-01-01 | End: 2021-01-01 | Stop reason: HOSPADM

## 2021-01-01 RX ORDER — TAMSULOSIN HYDROCHLORIDE 0.4 MG/1
0.4 CAPSULE ORAL DAILY
Qty: 30 CAPSULE | Refills: 3 | Status: SHIPPED | OUTPATIENT
Start: 2021-01-01

## 2021-01-01 RX ORDER — FENTANYL CITRATE 50 UG/ML
INJECTION, SOLUTION INTRAMUSCULAR; INTRAVENOUS PRN
Status: DISCONTINUED | OUTPATIENT
Start: 2021-01-01 | End: 2021-01-01 | Stop reason: SDUPTHER

## 2021-01-01 RX ORDER — MIDAZOLAM HYDROCHLORIDE 1 MG/ML
INJECTION INTRAMUSCULAR; INTRAVENOUS PRN
Status: DISCONTINUED | OUTPATIENT
Start: 2021-01-01 | End: 2021-01-01 | Stop reason: SDUPTHER

## 2021-01-01 RX ORDER — SODIUM CHLORIDE 9 MG/ML
25 INJECTION, SOLUTION INTRAVENOUS PRN
Status: DISCONTINUED | OUTPATIENT
Start: 2021-01-01 | End: 2021-01-01 | Stop reason: HOSPADM

## 2021-01-01 RX ORDER — SODIUM CHLORIDE 0.9 % (FLUSH) 0.9 %
10 SYRINGE (ML) INJECTION PRN
Status: DISCONTINUED | OUTPATIENT
Start: 2021-01-01 | End: 2021-01-01 | Stop reason: HOSPADM

## 2021-01-01 RX ORDER — SULFAMETHOXAZOLE AND TRIMETHOPRIM 800; 160 MG/1; MG/1
1 TABLET ORAL EVERY 12 HOURS SCHEDULED
Status: DISCONTINUED | OUTPATIENT
Start: 2021-01-01 | End: 2021-01-01 | Stop reason: HOSPADM

## 2021-01-01 RX ORDER — SODIUM CHLORIDE, SODIUM LACTATE, POTASSIUM CHLORIDE, CALCIUM CHLORIDE 600; 310; 30; 20 MG/100ML; MG/100ML; MG/100ML; MG/100ML
INJECTION, SOLUTION INTRAVENOUS CONTINUOUS PRN
Status: DISCONTINUED | OUTPATIENT
Start: 2021-01-01 | End: 2021-01-01 | Stop reason: SDUPTHER

## 2021-01-01 RX ORDER — MORPHINE SULFATE 10 MG/ML
1 INJECTION, SOLUTION INTRAMUSCULAR; INTRAVENOUS EVERY 5 MIN PRN
Status: DISCONTINUED | OUTPATIENT
Start: 2021-01-01 | End: 2021-01-01 | Stop reason: HOSPADM

## 2021-01-01 RX ORDER — OXYCODONE HYDROCHLORIDE AND ACETAMINOPHEN 5; 325 MG/1; MG/1
2 TABLET ORAL PRN
Status: DISCONTINUED | OUTPATIENT
Start: 2021-01-01 | End: 2021-01-01 | Stop reason: HOSPADM

## 2021-01-01 RX ORDER — DIPHENHYDRAMINE HCL 25 MG
25 TABLET ORAL EVERY 6 HOURS PRN
Status: DISCONTINUED | OUTPATIENT
Start: 2021-01-01 | End: 2021-01-01 | Stop reason: HOSPADM

## 2021-01-01 RX ORDER — ONDANSETRON 2 MG/ML
4 INJECTION INTRAMUSCULAR; INTRAVENOUS PRN
Status: DISCONTINUED | OUTPATIENT
Start: 2021-01-01 | End: 2021-01-01 | Stop reason: HOSPADM

## 2021-01-01 RX ORDER — GLYCOPYRROLATE 0.2 MG/ML
INJECTION INTRAMUSCULAR; INTRAVENOUS PRN
Status: DISCONTINUED | OUTPATIENT
Start: 2021-01-01 | End: 2021-01-01 | Stop reason: SDUPTHER

## 2021-01-01 RX ORDER — SODIUM CHLORIDE, SODIUM LACTATE, POTASSIUM CHLORIDE, CALCIUM CHLORIDE 600; 310; 30; 20 MG/100ML; MG/100ML; MG/100ML; MG/100ML
INJECTION, SOLUTION INTRAVENOUS CONTINUOUS
Status: DISCONTINUED | OUTPATIENT
Start: 2021-01-01 | End: 2021-01-01 | Stop reason: HOSPADM

## 2021-01-01 RX ORDER — POTASSIUM CHLORIDE 7.45 MG/ML
10 INJECTION INTRAVENOUS PRN
Status: DISCONTINUED | OUTPATIENT
Start: 2021-01-01 | End: 2021-01-01 | Stop reason: HOSPADM

## 2021-01-01 RX ORDER — PANTOPRAZOLE SODIUM 40 MG/1
40 GRANULE, DELAYED RELEASE ORAL
COMMUNITY
End: 2021-01-01 | Stop reason: ALTCHOICE

## 2021-01-01 RX ORDER — HEPARIN SODIUM (PORCINE) LOCK FLUSH IV SOLN 100 UNIT/ML 100 UNIT/ML
30 SOLUTION INTRAVENOUS ONCE
Status: COMPLETED | OUTPATIENT
Start: 2021-01-01 | End: 2021-01-01

## 2021-01-01 RX ORDER — ZOLPIDEM TARTRATE 5 MG/1
5 TABLET ORAL NIGHTLY PRN
Status: DISCONTINUED | OUTPATIENT
Start: 2021-01-01 | End: 2021-01-01 | Stop reason: HOSPADM

## 2021-01-01 RX ORDER — ONDANSETRON 2 MG/ML
4 INJECTION INTRAMUSCULAR; INTRAVENOUS EVERY 6 HOURS PRN
Status: DISCONTINUED | OUTPATIENT
Start: 2021-01-01 | End: 2021-01-01 | Stop reason: HOSPADM

## 2021-01-01 RX ORDER — LIDOCAINE HYDROCHLORIDE 20 MG/ML
INJECTION, SOLUTION INFILTRATION; PERINEURAL PRN
Status: DISCONTINUED | OUTPATIENT
Start: 2021-01-01 | End: 2021-01-01 | Stop reason: SDUPTHER

## 2021-01-01 RX ORDER — OXYCODONE HYDROCHLORIDE 5 MG/1
10 TABLET ORAL EVERY 4 HOURS PRN
Status: DISCONTINUED | OUTPATIENT
Start: 2021-01-01 | End: 2021-01-01 | Stop reason: HOSPADM

## 2021-01-01 RX ORDER — LABETALOL HYDROCHLORIDE 5 MG/ML
5 INJECTION, SOLUTION INTRAVENOUS EVERY 10 MIN PRN
Status: DISCONTINUED | OUTPATIENT
Start: 2021-01-01 | End: 2021-01-01 | Stop reason: HOSPADM

## 2021-01-01 RX ORDER — MORPHINE SULFATE 10 MG/ML
2 INJECTION, SOLUTION INTRAMUSCULAR; INTRAVENOUS EVERY 5 MIN PRN
Status: DISCONTINUED | OUTPATIENT
Start: 2021-01-01 | End: 2021-01-01 | Stop reason: HOSPADM

## 2021-01-01 RX ORDER — DEXAMETHASONE SODIUM PHOSPHATE 4 MG/ML
INJECTION, SOLUTION INTRA-ARTICULAR; INTRALESIONAL; INTRAMUSCULAR; INTRAVENOUS; SOFT TISSUE PRN
Status: DISCONTINUED | OUTPATIENT
Start: 2021-01-01 | End: 2021-01-01 | Stop reason: SDUPTHER

## 2021-01-01 RX ORDER — ONDANSETRON 2 MG/ML
4 INJECTION INTRAMUSCULAR; INTRAVENOUS EVERY 10 MIN PRN
Status: DISCONTINUED | OUTPATIENT
Start: 2021-01-01 | End: 2021-01-01 | Stop reason: HOSPADM

## 2021-01-01 RX ORDER — SODIUM CHLORIDE 9 MG/ML
INJECTION, SOLUTION INTRAVENOUS CONTINUOUS
Status: DISCONTINUED | OUTPATIENT
Start: 2021-01-01 | End: 2021-01-01 | Stop reason: HOSPADM

## 2021-01-01 RX ORDER — ACETAMINOPHEN 325 MG/1
650 TABLET ORAL EVERY 6 HOURS PRN
Status: DISCONTINUED | OUTPATIENT
Start: 2021-01-01 | End: 2021-01-01 | Stop reason: HOSPADM

## 2021-01-01 RX ORDER — ONDANSETRON 2 MG/ML
INJECTION INTRAMUSCULAR; INTRAVENOUS PRN
Status: DISCONTINUED | OUTPATIENT
Start: 2021-01-01 | End: 2021-01-01 | Stop reason: SDUPTHER

## 2021-01-01 RX ORDER — LORAZEPAM 0.5 MG/1
0.5 TABLET ORAL NIGHTLY PRN
Status: DISCONTINUED | OUTPATIENT
Start: 2021-01-01 | End: 2021-01-01 | Stop reason: HOSPADM

## 2021-01-01 RX ORDER — DIPHENHYDRAMINE HCL 25 MG
25 TABLET ORAL EVERY 6 HOURS PRN
Qty: 20 TABLET | Refills: 0 | Status: SHIPPED | OUTPATIENT
Start: 2021-01-01 | End: 2021-09-10

## 2021-01-01 RX ORDER — LIDOCAINE HYDROCHLORIDE 10 MG/ML
0.3 INJECTION, SOLUTION EPIDURAL; INFILTRATION; INTRACAUDAL; PERINEURAL
Status: DISCONTINUED | OUTPATIENT
Start: 2021-01-01 | End: 2021-01-01 | Stop reason: HOSPADM

## 2021-01-01 RX ORDER — HEPARIN SODIUM,PORCINE 10 UNIT/ML
3 VIAL (ML) INTRAVENOUS PRN
Status: DISCONTINUED | OUTPATIENT
Start: 2021-01-01 | End: 2021-01-01

## 2021-01-01 RX ORDER — FAMOTIDINE 20 MG/1
20 TABLET, FILM COATED ORAL 2 TIMES DAILY
Qty: 60 TABLET | Refills: 5 | Status: SHIPPED | OUTPATIENT
Start: 2021-01-01 | End: 2021-01-01

## 2021-01-01 RX ORDER — BUPIVACAINE HYDROCHLORIDE AND EPINEPHRINE 5; 5 MG/ML; UG/ML
INJECTION, SOLUTION PERINEURAL PRN
Status: DISCONTINUED | OUTPATIENT
Start: 2021-01-01 | End: 2021-01-01 | Stop reason: ALTCHOICE

## 2021-01-01 RX ORDER — PROCHLORPERAZINE MALEATE 10 MG
10 TABLET ORAL EVERY 6 HOURS PRN
COMMUNITY
Start: 2021-01-01

## 2021-01-01 RX ORDER — LISINOPRIL AND HYDROCHLOROTHIAZIDE 12.5; 1 MG/1; MG/1
1 TABLET ORAL DAILY
Status: DISCONTINUED | OUTPATIENT
Start: 2021-01-01 | End: 2021-01-01 | Stop reason: HOSPADM

## 2021-01-01 RX ORDER — SODIUM CHLORIDE 0.9 % (FLUSH) 0.9 %
10 SYRINGE (ML) INJECTION EVERY 12 HOURS SCHEDULED
Status: DISCONTINUED | OUTPATIENT
Start: 2021-01-01 | End: 2021-01-01 | Stop reason: HOSPADM

## 2021-01-01 RX ORDER — SULFAMETHOXAZOLE AND TRIMETHOPRIM 800; 160 MG/1; MG/1
1 TABLET ORAL EVERY 12 HOURS SCHEDULED
Qty: 20 TABLET | Refills: 0 | Status: SHIPPED | OUTPATIENT
Start: 2021-01-01 | End: 2021-01-01

## 2021-01-01 RX ORDER — CALCIPOTRIENE 50 UG/G
CREAM TOPICAL
Qty: 1 TUBE | Refills: 4 | Status: SHIPPED | OUTPATIENT
Start: 2021-01-01 | End: 2021-01-01 | Stop reason: ALTCHOICE

## 2021-01-01 RX ORDER — MAGNESIUM SULFATE 1 G/100ML
1000 INJECTION INTRAVENOUS PRN
Status: DISCONTINUED | OUTPATIENT
Start: 2021-01-01 | End: 2021-01-01 | Stop reason: HOSPADM

## 2021-01-01 RX ORDER — ONDANSETRON HYDROCHLORIDE 8 MG/1
TABLET, FILM COATED ORAL PRN
COMMUNITY
Start: 2021-01-01

## 2021-01-01 RX ORDER — ASPIRIN 81 MG/1
81 TABLET ORAL DAILY
Status: DISCONTINUED | OUTPATIENT
Start: 2021-01-01 | End: 2021-01-01 | Stop reason: HOSPADM

## 2021-01-01 RX ORDER — SODIUM CHLORIDE 0.9 % (FLUSH) 0.9 %
SYRINGE (ML) INJECTION
Status: DISCONTINUED
Start: 2021-01-01 | End: 2021-01-01 | Stop reason: HOSPADM

## 2021-01-01 RX ORDER — MEPERIDINE HYDROCHLORIDE 25 MG/ML
12.5 INJECTION INTRAMUSCULAR; INTRAVENOUS; SUBCUTANEOUS EVERY 5 MIN PRN
Status: DISCONTINUED | OUTPATIENT
Start: 2021-01-01 | End: 2021-01-01 | Stop reason: HOSPADM

## 2021-01-01 RX ORDER — DIPHENHYDRAMINE HYDROCHLORIDE 50 MG/ML
12.5 INJECTION INTRAMUSCULAR; INTRAVENOUS
Status: DISCONTINUED | OUTPATIENT
Start: 2021-01-01 | End: 2021-01-01 | Stop reason: HOSPADM

## 2021-01-01 RX ORDER — AMOXICILLIN AND CLAVULANATE POTASSIUM 875; 125 MG/1; MG/1
1 TABLET, FILM COATED ORAL 2 TIMES DAILY
Qty: 20 TABLET | Refills: 0 | Status: ON HOLD | OUTPATIENT
Start: 2021-01-01 | End: 2021-01-01

## 2021-01-01 RX ORDER — DOXYCYCLINE HYCLATE 100 MG
100 TABLET ORAL 2 TIMES DAILY
Qty: 20 TABLET | Refills: 0 | Status: SHIPPED | OUTPATIENT
Start: 2021-01-01 | End: 2021-01-01 | Stop reason: ALTCHOICE

## 2021-01-01 RX ORDER — PROMETHAZINE HYDROCHLORIDE 25 MG/1
12.5 TABLET ORAL EVERY 6 HOURS PRN
Status: DISCONTINUED | OUTPATIENT
Start: 2021-01-01 | End: 2021-01-01

## 2021-01-01 RX ORDER — EPHEDRINE SULFATE 50 MG/ML
INJECTION INTRAVENOUS PRN
Status: DISCONTINUED | OUTPATIENT
Start: 2021-01-01 | End: 2021-01-01 | Stop reason: SDUPTHER

## 2021-01-01 RX ORDER — AMOXICILLIN 500 MG/1
CAPSULE ORAL
Qty: 8 CAPSULE | Refills: 1 | Status: SHIPPED | OUTPATIENT
Start: 2021-01-01 | End: 2021-01-01

## 2021-01-01 RX ORDER — MECOBALAMIN 5000 MCG
5 TABLET,DISINTEGRATING ORAL NIGHTLY PRN
Status: DISCONTINUED | OUTPATIENT
Start: 2021-01-01 | End: 2021-01-01 | Stop reason: HOSPADM

## 2021-01-01 RX ORDER — CIPROFLOXACIN 500 MG/1
500 TABLET, FILM COATED ORAL 2 TIMES DAILY
Qty: 20 TABLET | Refills: 0 | Status: SHIPPED | OUTPATIENT
Start: 2021-01-01 | End: 2021-01-01 | Stop reason: HOSPADM

## 2021-01-01 RX ORDER — ONDANSETRON 2 MG/ML
4 INJECTION INTRAMUSCULAR; INTRAVENOUS
Status: DISCONTINUED | OUTPATIENT
Start: 2021-01-01 | End: 2021-01-01 | Stop reason: HOSPADM

## 2021-01-01 RX ORDER — SODIUM CHLORIDE 0.9 % (FLUSH) 0.9 %
5-40 SYRINGE (ML) INJECTION PRN
Status: DISCONTINUED | OUTPATIENT
Start: 2021-01-01 | End: 2021-01-01 | Stop reason: HOSPADM

## 2021-01-01 RX ORDER — OXYCODONE HYDROCHLORIDE 5 MG/1
5 TABLET ORAL EVERY 4 HOURS PRN
Status: COMPLETED | OUTPATIENT
Start: 2021-01-01 | End: 2021-01-01

## 2021-01-01 RX ORDER — ASPIRIN 81 MG/1
81 TABLET ORAL DAILY
COMMUNITY

## 2021-01-01 RX ORDER — CIPROFLOXACIN 500 MG/1
500 TABLET, FILM COATED ORAL EVERY 12 HOURS SCHEDULED
Status: DISCONTINUED | OUTPATIENT
Start: 2021-01-01 | End: 2021-01-01

## 2021-01-01 RX ORDER — SODIUM CHLORIDE 9 MG/ML
1000 INJECTION, SOLUTION INTRAVENOUS ONCE
Status: COMPLETED | OUTPATIENT
Start: 2021-01-01 | End: 2021-01-01

## 2021-01-01 RX ORDER — HYDRALAZINE HYDROCHLORIDE 20 MG/ML
5 INJECTION INTRAMUSCULAR; INTRAVENOUS EVERY 10 MIN PRN
Status: DISCONTINUED | OUTPATIENT
Start: 2021-01-01 | End: 2021-01-01 | Stop reason: HOSPADM

## 2021-01-01 RX ORDER — POTASSIUM CHLORIDE 20 MEQ/1
40 TABLET, EXTENDED RELEASE ORAL PRN
Status: DISCONTINUED | OUTPATIENT
Start: 2021-01-01 | End: 2021-01-01 | Stop reason: HOSPADM

## 2021-01-01 RX ORDER — MEPERIDINE HYDROCHLORIDE 50 MG/ML
12.5 INJECTION INTRAMUSCULAR; INTRAVENOUS; SUBCUTANEOUS EVERY 5 MIN PRN
Status: DISCONTINUED | OUTPATIENT
Start: 2021-01-01 | End: 2021-01-01 | Stop reason: HOSPADM

## 2021-01-01 RX ORDER — METHYLPREDNISOLONE SODIUM SUCCINATE 40 MG/ML
40 INJECTION, POWDER, LYOPHILIZED, FOR SOLUTION INTRAMUSCULAR; INTRAVENOUS ONCE
Status: COMPLETED | OUTPATIENT
Start: 2021-01-01 | End: 2021-01-01

## 2021-01-01 RX ORDER — SODIUM CHLORIDE, SODIUM LACTATE, POTASSIUM CHLORIDE, CALCIUM CHLORIDE 600; 310; 30; 20 MG/100ML; MG/100ML; MG/100ML; MG/100ML
INJECTION, SOLUTION INTRAVENOUS CONTINUOUS
Status: DISCONTINUED | OUTPATIENT
Start: 2021-01-01 | End: 2021-01-01 | Stop reason: SDUPTHER

## 2021-01-01 RX ORDER — MORPHINE SULFATE 10 MG/ML
1 INJECTION, SOLUTION INTRAMUSCULAR; INTRAVENOUS ONCE
Status: COMPLETED | OUTPATIENT
Start: 2021-01-01 | End: 2021-01-01

## 2021-01-01 RX ORDER — CEFAZOLIN SODIUM 1 G/3ML
INJECTION, POWDER, FOR SOLUTION INTRAMUSCULAR; INTRAVENOUS PRN
Status: DISCONTINUED | OUTPATIENT
Start: 2021-01-01 | End: 2021-01-01 | Stop reason: SDUPTHER

## 2021-01-01 RX ORDER — OXYCODONE HYDROCHLORIDE 5 MG/1
10 TABLET ORAL EVERY 4 HOURS PRN
Status: COMPLETED | OUTPATIENT
Start: 2021-01-01 | End: 2021-01-01

## 2021-01-01 RX ADMIN — SODIUM CHLORIDE, PRESERVATIVE FREE 10 ML: 5 INJECTION INTRAVENOUS at 22:52

## 2021-01-01 RX ADMIN — ENOXAPARIN SODIUM 40 MG: 40 INJECTION SUBCUTANEOUS at 20:50

## 2021-01-01 RX ADMIN — SUGAMMADEX 200 MG: 100 INJECTION, SOLUTION INTRAVENOUS at 11:39

## 2021-01-01 RX ADMIN — PANCRELIPASE 36000 UNITS: 24000; 76000; 120000 CAPSULE, DELAYED RELEASE PELLETS ORAL at 11:24

## 2021-01-01 RX ADMIN — CEFAZOLIN 2000 MG: 330 INJECTION, POWDER, FOR SOLUTION INTRAMUSCULAR; INTRAVENOUS at 16:19

## 2021-01-01 RX ADMIN — CIPROFLOXACIN 500 MG: 500 TABLET, FILM COATED ORAL at 20:28

## 2021-01-01 RX ADMIN — PANCRELIPASE 36000 UNITS: 24000; 76000; 120000 CAPSULE, DELAYED RELEASE PELLETS ORAL at 08:33

## 2021-01-01 RX ADMIN — ONDANSETRON HYDROCHLORIDE 4 MG: 2 INJECTION, SOLUTION INTRAMUSCULAR; INTRAVENOUS at 14:16

## 2021-01-01 RX ADMIN — SODIUM CHLORIDE 1000 ML: 9 INJECTION, SOLUTION INTRAVENOUS at 06:43

## 2021-01-01 RX ADMIN — CIPROFLOXACIN 500 MG: 500 TABLET, FILM COATED ORAL at 09:05

## 2021-01-01 RX ADMIN — METHYLPREDNISOLONE SODIUM SUCCINATE 40 MG: 40 INJECTION, POWDER, FOR SOLUTION INTRAMUSCULAR; INTRAVENOUS at 12:46

## 2021-01-01 RX ADMIN — SODIUM CHLORIDE, POTASSIUM CHLORIDE, SODIUM LACTATE AND CALCIUM CHLORIDE: 600; 310; 30; 20 INJECTION, SOLUTION INTRAVENOUS at 09:55

## 2021-01-01 RX ADMIN — HYDROMORPHONE HYDROCHLORIDE 0.5 MG: 1 INJECTION, SOLUTION INTRAMUSCULAR; INTRAVENOUS; SUBCUTANEOUS at 12:36

## 2021-01-01 RX ADMIN — PANCRELIPASE 36000 UNITS: 24000; 76000; 120000 CAPSULE, DELAYED RELEASE PELLETS ORAL at 12:47

## 2021-01-01 RX ADMIN — OXYCODONE HYDROCHLORIDE 5 MG: 5 TABLET ORAL at 20:44

## 2021-01-01 RX ADMIN — EPHEDRINE SULFATE 10 MG: 50 INJECTION INTRAVENOUS at 15:14

## 2021-01-01 RX ADMIN — HYDROMORPHONE HYDROCHLORIDE 0.5 MG: 1 INJECTION, SOLUTION INTRAMUSCULAR; INTRAVENOUS; SUBCUTANEOUS at 17:36

## 2021-01-01 RX ADMIN — SODIUM CHLORIDE, PRESERVATIVE FREE 10 ML: 5 INJECTION INTRAVENOUS at 20:52

## 2021-01-01 RX ADMIN — ONDANSETRON 4 MG: 2 INJECTION, SOLUTION INTRAMUSCULAR; INTRAVENOUS at 10:02

## 2021-01-01 RX ADMIN — OXYCODONE HYDROCHLORIDE 10 MG: 5 TABLET ORAL at 09:12

## 2021-01-01 RX ADMIN — PANCRELIPASE 36000 UNITS: 24000; 76000; 120000 CAPSULE, DELAYED RELEASE PELLETS ORAL at 11:43

## 2021-01-01 RX ADMIN — PANCRELIPASE 36000 UNITS: 24000; 76000; 120000 CAPSULE, DELAYED RELEASE PELLETS ORAL at 08:29

## 2021-01-01 RX ADMIN — OXYCODONE 10 MG: 5 TABLET ORAL at 11:27

## 2021-01-01 RX ADMIN — CEFEPIME HYDROCHLORIDE 2000 MG: 2 INJECTION, POWDER, FOR SOLUTION INTRAVENOUS at 09:49

## 2021-01-01 RX ADMIN — SODIUM CHLORIDE, POTASSIUM CHLORIDE, SODIUM LACTATE AND CALCIUM CHLORIDE: 600; 310; 30; 20 INJECTION, SOLUTION INTRAVENOUS at 14:44

## 2021-01-01 RX ADMIN — FENTANYL CITRATE 100 MCG: 50 INJECTION INTRAMUSCULAR; INTRAVENOUS at 10:02

## 2021-01-01 RX ADMIN — SULFAMETHOXAZOLE AND TRIMETHOPRIM 1 TABLET: 800; 160 TABLET ORAL at 09:16

## 2021-01-01 RX ADMIN — ASPIRIN 81 MG: 81 TABLET, COATED ORAL at 09:12

## 2021-01-01 RX ADMIN — PHENYLEPHRINE HYDROCHLORIDE 200 MCG: 10 INJECTION INTRAVENOUS at 10:57

## 2021-01-01 RX ADMIN — TAMSULOSIN HYDROCHLORIDE 0.4 MG: 0.4 CAPSULE ORAL at 09:12

## 2021-01-01 RX ADMIN — ENOXAPARIN SODIUM 30 MG: 30 INJECTION SUBCUTANEOUS at 08:20

## 2021-01-01 RX ADMIN — PANCRELIPASE 36000 UNITS: 24000; 76000; 120000 CAPSULE, DELAYED RELEASE PELLETS ORAL at 17:03

## 2021-01-01 RX ADMIN — TBO-FILGRASTIM 300 MCG: 300 INJECTION, SOLUTION SUBCUTANEOUS at 16:47

## 2021-01-01 RX ADMIN — IOPAMIDOL 75 ML: 755 INJECTION, SOLUTION INTRAVENOUS at 11:01

## 2021-01-01 RX ADMIN — PANCRELIPASE 36000 UNITS: 24000; 76000; 120000 CAPSULE, DELAYED RELEASE PELLETS ORAL at 18:15

## 2021-01-01 RX ADMIN — IOPAMIDOL 75 ML: 755 INJECTION, SOLUTION INTRAVENOUS at 01:47

## 2021-01-01 RX ADMIN — SODIUM CHLORIDE, PRESERVATIVE FREE 10 ML: 5 INJECTION INTRAVENOUS at 09:16

## 2021-01-01 RX ADMIN — OXYCODONE 10 MG: 5 TABLET ORAL at 09:16

## 2021-01-01 RX ADMIN — MORPHINE SULFATE 1 MG: 10 INJECTION INTRAVENOUS at 12:02

## 2021-01-01 RX ADMIN — BISACODYL 5 MG: 5 TABLET, COATED ORAL at 11:44

## 2021-01-01 RX ADMIN — SODIUM CHLORIDE, PRESERVATIVE FREE 10 ML: 5 INJECTION INTRAVENOUS at 22:02

## 2021-01-01 RX ADMIN — FENTANYL CITRATE 50 MCG: 50 INJECTION INTRAMUSCULAR; INTRAVENOUS at 15:52

## 2021-01-01 RX ADMIN — PANCRELIPASE 36000 UNITS: 24000; 76000; 120000 CAPSULE, DELAYED RELEASE PELLETS ORAL at 11:32

## 2021-01-01 RX ADMIN — IOHEXOL 50 ML: 240 INJECTION, SOLUTION INTRATHECAL; INTRAVASCULAR; INTRAVENOUS; ORAL at 11:01

## 2021-01-01 RX ADMIN — TBO-FILGRASTIM 300 MCG: 300 INJECTION, SOLUTION SUBCUTANEOUS at 16:29

## 2021-01-01 RX ADMIN — PANCRELIPASE 36000 UNITS: 24000; 76000; 120000 CAPSULE, DELAYED RELEASE PELLETS ORAL at 09:04

## 2021-01-01 RX ADMIN — TAMSULOSIN HYDROCHLORIDE 0.4 MG: 0.4 CAPSULE ORAL at 08:19

## 2021-01-01 RX ADMIN — IOVERSOL 10 ML: 741 INJECTION INTRA-ARTERIAL; INTRAVENOUS at 15:44

## 2021-01-01 RX ADMIN — TBO-FILGRASTIM 300 MCG: 300 INJECTION, SOLUTION SUBCUTANEOUS at 16:11

## 2021-01-01 RX ADMIN — SULFAMETHOXAZOLE AND TRIMETHOPRIM 1 TABLET: 800; 160 TABLET ORAL at 20:52

## 2021-01-01 RX ADMIN — TAMSULOSIN HYDROCHLORIDE 0.4 MG: 0.4 CAPSULE ORAL at 08:29

## 2021-01-01 RX ADMIN — PANCRELIPASE 36000 UNITS: 24000; 76000; 120000 CAPSULE, DELAYED RELEASE PELLETS ORAL at 11:44

## 2021-01-01 RX ADMIN — DIPHENHYDRAMINE HCL 25 MG: 25 TABLET ORAL at 09:15

## 2021-01-01 RX ADMIN — SODIUM CHLORIDE, PRESERVATIVE FREE 10 ML: 5 INJECTION INTRAVENOUS at 20:28

## 2021-01-01 RX ADMIN — HYDROMORPHONE HYDROCHLORIDE 0.5 MG: 1 INJECTION, SOLUTION INTRAMUSCULAR; INTRAVENOUS; SUBCUTANEOUS at 18:01

## 2021-01-01 RX ADMIN — PANCRELIPASE 36000 UNITS: 24000; 76000; 120000 CAPSULE, DELAYED RELEASE PELLETS ORAL at 09:16

## 2021-01-01 RX ADMIN — GLYCOPYRROLATE 0.2 MG: 0.2 INJECTION, SOLUTION INTRAMUSCULAR; INTRAVENOUS at 11:06

## 2021-01-01 RX ADMIN — CEFEPIME HYDROCHLORIDE 2000 MG: 2 INJECTION, POWDER, FOR SOLUTION INTRAVENOUS at 22:31

## 2021-01-01 RX ADMIN — PANCRELIPASE 36000 UNITS: 24000; 76000; 120000 CAPSULE, DELAYED RELEASE PELLETS ORAL at 09:26

## 2021-01-01 RX ADMIN — ENOXAPARIN SODIUM 40 MG: 40 INJECTION SUBCUTANEOUS at 09:12

## 2021-01-01 RX ADMIN — ROCURONIUM BROMIDE 40 MG: 10 INJECTION, SOLUTION INTRAVENOUS at 14:47

## 2021-01-01 RX ADMIN — ENOXAPARIN SODIUM 30 MG: 30 INJECTION SUBCUTANEOUS at 17:33

## 2021-01-01 RX ADMIN — TAMSULOSIN HYDROCHLORIDE 0.4 MG: 0.4 CAPSULE ORAL at 21:27

## 2021-01-01 RX ADMIN — FENTANYL CITRATE 25 MCG: 50 INJECTION INTRAMUSCULAR; INTRAVENOUS at 10:55

## 2021-01-01 RX ADMIN — OXYCODONE 5 MG: 5 TABLET ORAL at 03:21

## 2021-01-01 RX ADMIN — OXYCODONE 5 MG: 5 TABLET ORAL at 04:18

## 2021-01-01 RX ADMIN — OXYCODONE HYDROCHLORIDE 5 MG: 5 TABLET ORAL at 03:38

## 2021-01-01 RX ADMIN — ZOLPIDEM TARTRATE 5 MG: 5 TABLET ORAL at 20:30

## 2021-01-01 RX ADMIN — OXYCODONE 10 MG: 5 TABLET ORAL at 16:50

## 2021-01-01 RX ADMIN — DIPHENHYDRAMINE HCL 25 MG: 25 TABLET ORAL at 14:44

## 2021-01-01 RX ADMIN — TAMSULOSIN HYDROCHLORIDE 0.4 MG: 0.4 CAPSULE ORAL at 08:33

## 2021-01-01 RX ADMIN — Medication 5 ML: at 20:45

## 2021-01-01 RX ADMIN — CEFEPIME HYDROCHLORIDE 2000 MG: 2 INJECTION, POWDER, FOR SOLUTION INTRAVENOUS at 09:29

## 2021-01-01 RX ADMIN — DEXAMETHASONE SODIUM PHOSPHATE 4 MG: 4 INJECTION, SOLUTION INTRAMUSCULAR; INTRAVENOUS at 11:28

## 2021-01-01 RX ADMIN — ASPIRIN 81 MG: 81 TABLET, COATED ORAL at 08:19

## 2021-01-01 RX ADMIN — PANCRELIPASE 36000 UNITS: 24000; 76000; 120000 CAPSULE, DELAYED RELEASE PELLETS ORAL at 10:44

## 2021-01-01 RX ADMIN — ONDANSETRON 4 MG: 2 INJECTION, SOLUTION INTRAMUSCULAR; INTRAVENOUS at 14:47

## 2021-01-01 RX ADMIN — LIDOCAINE HYDROCHLORIDE 60 MG: 20 INJECTION, SOLUTION INFILTRATION; PERINEURAL at 10:52

## 2021-01-01 RX ADMIN — ASPIRIN 81 MG: 81 TABLET, COATED ORAL at 08:33

## 2021-01-01 RX ADMIN — CEFAZOLIN 2000 MG: 10 INJECTION, POWDER, FOR SOLUTION INTRAVENOUS at 10:57

## 2021-01-01 RX ADMIN — IOHEXOL 50 ML: 240 INJECTION, SOLUTION INTRATHECAL; INTRAVASCULAR; INTRAVENOUS; ORAL at 16:02

## 2021-01-01 RX ADMIN — PROPOFOL 200 MG: 10 INJECTION, EMULSION INTRAVENOUS at 14:47

## 2021-01-01 RX ADMIN — PANCRELIPASE 36000 UNITS: 24000; 76000; 120000 CAPSULE, DELAYED RELEASE PELLETS ORAL at 09:19

## 2021-01-01 RX ADMIN — OXYCODONE 5 MG: 5 TABLET ORAL at 08:06

## 2021-01-01 RX ADMIN — ONDANSETRON 4 MG: 2 INJECTION, SOLUTION INTRAMUSCULAR; INTRAVENOUS at 16:42

## 2021-01-01 RX ADMIN — OXYCODONE 10 MG: 5 TABLET ORAL at 22:57

## 2021-01-01 RX ADMIN — LIDOCAINE HYDROCHLORIDE 60 MG: 20 INJECTION, SOLUTION INFILTRATION; PERINEURAL at 10:02

## 2021-01-01 RX ADMIN — MIDAZOLAM HYDROCHLORIDE 1 MG: 2 INJECTION, SOLUTION INTRAMUSCULAR; INTRAVENOUS at 10:43

## 2021-01-01 RX ADMIN — OXYCODONE 10 MG: 5 TABLET ORAL at 23:02

## 2021-01-01 RX ADMIN — SODIUM CHLORIDE, PRESERVATIVE FREE 10 ML: 5 INJECTION INTRAVENOUS at 09:07

## 2021-01-01 RX ADMIN — OXYCODONE 5 MG: 5 TABLET ORAL at 04:56

## 2021-01-01 RX ADMIN — SODIUM CHLORIDE, POTASSIUM CHLORIDE, SODIUM LACTATE AND CALCIUM CHLORIDE: 600; 310; 30; 20 INJECTION, SOLUTION INTRAVENOUS at 10:46

## 2021-01-01 RX ADMIN — ZOLPIDEM TARTRATE 5 MG: 5 TABLET ORAL at 18:02

## 2021-01-01 RX ADMIN — TAMSULOSIN HYDROCHLORIDE 0.4 MG: 0.4 CAPSULE ORAL at 08:06

## 2021-01-01 RX ADMIN — OXYCODONE 10 MG: 5 TABLET ORAL at 15:07

## 2021-01-01 RX ADMIN — ROCURONIUM BROMIDE 40 MG: 10 INJECTION, SOLUTION INTRAVENOUS at 10:52

## 2021-01-01 RX ADMIN — ASPIRIN 81 MG: 81 TABLET, COATED ORAL at 09:27

## 2021-01-01 RX ADMIN — SODIUM CHLORIDE, PRESERVATIVE FREE 10 ML: 5 INJECTION INTRAVENOUS at 08:34

## 2021-01-01 RX ADMIN — SODIUM CHLORIDE, PRESERVATIVE FREE 10 ML: 5 INJECTION INTRAVENOUS at 08:05

## 2021-01-01 RX ADMIN — HYDROMORPHONE HYDROCHLORIDE 1 MG: 1 INJECTION, SOLUTION INTRAMUSCULAR; INTRAVENOUS; SUBCUTANEOUS at 13:39

## 2021-01-01 RX ADMIN — OXYCODONE 10 MG: 5 TABLET ORAL at 04:00

## 2021-01-01 RX ADMIN — CEFEPIME HYDROCHLORIDE 2000 MG: 2 INJECTION, POWDER, FOR SOLUTION INTRAVENOUS at 11:10

## 2021-01-01 RX ADMIN — PANCRELIPASE 36000 UNITS: 24000; 76000; 120000 CAPSULE, DELAYED RELEASE PELLETS ORAL at 17:33

## 2021-01-01 RX ADMIN — OXYCODONE 10 MG: 5 TABLET ORAL at 10:43

## 2021-01-01 RX ADMIN — SODIUM CHLORIDE: 9 INJECTION, SOLUTION INTRAVENOUS at 07:03

## 2021-01-01 RX ADMIN — ROCURONIUM BROMIDE 20 MG: 10 INJECTION, SOLUTION INTRAVENOUS at 15:46

## 2021-01-01 RX ADMIN — SODIUM CHLORIDE, PRESERVATIVE FREE 10 ML: 5 INJECTION INTRAVENOUS at 08:33

## 2021-01-01 RX ADMIN — PANCRELIPASE 36000 UNITS: 24000; 76000; 120000 CAPSULE, DELAYED RELEASE PELLETS ORAL at 16:29

## 2021-01-01 RX ADMIN — FENTANYL CITRATE 50 MCG: 50 INJECTION INTRAMUSCULAR; INTRAVENOUS at 14:47

## 2021-01-01 RX ADMIN — LISINOPRIL AND HYDROCHLOROTHIAZIDE 1 TABLET: 12.5; 1 TABLET ORAL at 09:12

## 2021-01-01 RX ADMIN — PANCRELIPASE 36000 UNITS: 24000; 76000; 120000 CAPSULE, DELAYED RELEASE PELLETS ORAL at 08:18

## 2021-01-01 RX ADMIN — PANCRELIPASE 36000 UNITS: 24000; 76000; 120000 CAPSULE, DELAYED RELEASE PELLETS ORAL at 16:47

## 2021-01-01 RX ADMIN — SODIUM CHLORIDE, PRESERVATIVE FREE 10 ML: 5 INJECTION INTRAVENOUS at 09:28

## 2021-01-01 RX ADMIN — GLYCOPYRROLATE 0.2 MG: 0.2 INJECTION, SOLUTION INTRAMUSCULAR; INTRAVENOUS at 10:40

## 2021-01-01 RX ADMIN — TBO-FILGRASTIM 300 MCG: 300 INJECTION, SOLUTION SUBCUTANEOUS at 18:18

## 2021-01-01 RX ADMIN — HEPARIN SODIUM (PORCINE) LOCK FLUSH IV SOLN 100 UNIT/ML 30 UNITS: 100 SOLUTION at 13:54

## 2021-01-01 RX ADMIN — OXYCODONE 10 MG: 5 TABLET ORAL at 00:30

## 2021-01-01 RX ADMIN — CEFEPIME HYDROCHLORIDE 2000 MG: 2 INJECTION, POWDER, FOR SOLUTION INTRAVENOUS at 22:54

## 2021-01-01 RX ADMIN — Medication 10 ML: at 10:17

## 2021-01-01 RX ADMIN — Medication 10 ML: at 09:13

## 2021-01-01 RX ADMIN — PHENYLEPHRINE HYDROCHLORIDE 200 MCG: 10 INJECTION INTRAVENOUS at 11:06

## 2021-01-01 RX ADMIN — CEFEPIME HYDROCHLORIDE 2000 MG: 2 INJECTION, POWDER, FOR SOLUTION INTRAVENOUS at 09:59

## 2021-01-01 RX ADMIN — ASPIRIN 81 MG: 81 TABLET, COATED ORAL at 09:15

## 2021-01-01 RX ADMIN — OXYCODONE 5 MG: 5 TABLET ORAL at 22:02

## 2021-01-01 RX ADMIN — PROPOFOL 150 MG: 10 INJECTION, EMULSION INTRAVENOUS at 10:02

## 2021-01-01 RX ADMIN — LIDOCAINE HYDROCHLORIDE 40 MG: 10 INJECTION, SOLUTION INFILTRATION; PERINEURAL at 10:51

## 2021-01-01 RX ADMIN — PROPOFOL 200 MG: 10 INJECTION, EMULSION INTRAVENOUS at 10:52

## 2021-01-01 RX ADMIN — OXYCODONE 10 MG: 5 TABLET ORAL at 11:32

## 2021-01-01 RX ADMIN — HYDROMORPHONE HYDROCHLORIDE 0.25 MG: 1 INJECTION, SOLUTION INTRAMUSCULAR; INTRAVENOUS; SUBCUTANEOUS at 12:23

## 2021-01-01 RX ADMIN — OXYCODONE 10 MG: 5 TABLET ORAL at 17:34

## 2021-01-01 RX ADMIN — PANCRELIPASE 36000 UNITS: 24000; 76000; 120000 CAPSULE, DELAYED RELEASE PELLETS ORAL at 11:08

## 2021-01-01 RX ADMIN — LIDOCAINE HYDROCHLORIDE 60 MG: 20 INJECTION, SOLUTION INFILTRATION; PERINEURAL at 14:47

## 2021-01-01 RX ADMIN — CEFEPIME HYDROCHLORIDE 2000 MG: 2 INJECTION, POWDER, FOR SOLUTION INTRAVENOUS at 23:00

## 2021-01-01 RX ADMIN — MIDAZOLAM HYDROCHLORIDE 0.5 MG: 2 INJECTION, SOLUTION INTRAMUSCULAR; INTRAVENOUS at 10:55

## 2021-01-01 RX ADMIN — SODIUM CHLORIDE: 9 INJECTION, SOLUTION INTRAVENOUS at 20:44

## 2021-01-01 RX ADMIN — ONDANSETRON 4 MG: 2 INJECTION, SOLUTION INTRAMUSCULAR; INTRAVENOUS at 11:16

## 2021-01-01 RX ADMIN — IOPAMIDOL 75 ML: 755 INJECTION, SOLUTION INTRAVENOUS at 16:02

## 2021-01-01 RX ADMIN — ROCURONIUM BROMIDE 50 MG: 10 INJECTION, SOLUTION INTRAVENOUS at 10:02

## 2021-01-01 RX ADMIN — SODIUM CHLORIDE, POTASSIUM CHLORIDE, SODIUM LACTATE AND CALCIUM CHLORIDE: 600; 310; 30; 20 INJECTION, SOLUTION INTRAVENOUS at 09:54

## 2021-01-01 RX ADMIN — PANCRELIPASE 36000 UNITS: 24000; 76000; 120000 CAPSULE, DELAYED RELEASE PELLETS ORAL at 16:11

## 2021-01-01 RX ADMIN — TAMSULOSIN HYDROCHLORIDE 0.4 MG: 0.4 CAPSULE ORAL at 09:16

## 2021-01-01 RX ADMIN — CEFEPIME HYDROCHLORIDE 2000 MG: 2 INJECTION, POWDER, FOR SOLUTION INTRAVENOUS at 10:17

## 2021-01-01 RX ADMIN — PANCRELIPASE 36000 UNITS: 24000; 76000; 120000 CAPSULE, DELAYED RELEASE PELLETS ORAL at 12:53

## 2021-01-01 RX ADMIN — TAMSULOSIN HYDROCHLORIDE 0.4 MG: 0.4 CAPSULE ORAL at 09:27

## 2021-01-01 RX ADMIN — SUGAMMADEX 200 MG: 100 INJECTION, SOLUTION INTRAVENOUS at 11:13

## 2021-01-01 RX ADMIN — PHENYLEPHRINE HYDROCHLORIDE 200 MCG: 10 INJECTION INTRAVENOUS at 11:04

## 2021-01-01 RX ADMIN — ASPIRIN 81 MG: 81 TABLET, COATED ORAL at 08:29

## 2021-01-01 RX ADMIN — ONDANSETRON 4 MG: 2 INJECTION INTRAMUSCULAR; INTRAVENOUS at 18:47

## 2021-01-01 RX ADMIN — ASPIRIN 81 MG: 81 TABLET, COATED ORAL at 09:05

## 2021-01-01 RX ADMIN — SUGAMMADEX 200 MG: 100 INJECTION, SOLUTION INTRAVENOUS at 16:42

## 2021-01-01 RX ADMIN — CEFEPIME HYDROCHLORIDE 2000 MG: 2 INJECTION, POWDER, FOR SOLUTION INTRAVENOUS at 22:04

## 2021-01-01 RX ADMIN — TAMSULOSIN HYDROCHLORIDE 0.4 MG: 0.4 CAPSULE ORAL at 09:05

## 2021-01-01 ASSESSMENT — PAIN DESCRIPTION - DESCRIPTORS
DESCRIPTORS: ACHING
DESCRIPTORS: SHARP
DESCRIPTORS: SHARP
DESCRIPTORS: THROBBING
DESCRIPTORS: SHARP
DESCRIPTORS: ACHING

## 2021-01-01 ASSESSMENT — PULMONARY FUNCTION TESTS
PIF_VALUE: 19
PIF_VALUE: 13
PIF_VALUE: 6
PIF_VALUE: 0
PIF_VALUE: 0
PIF_VALUE: 12
PIF_VALUE: 13
PIF_VALUE: 1
PIF_VALUE: 4
PIF_VALUE: 30
PIF_VALUE: 22
PIF_VALUE: 28
PIF_VALUE: 6
PIF_VALUE: 19
PIF_VALUE: 15
PIF_VALUE: 0
PIF_VALUE: 13
PIF_VALUE: 17
PIF_VALUE: 23
PIF_VALUE: 17
PIF_VALUE: 15
PIF_VALUE: 16
PIF_VALUE: 3
PIF_VALUE: 18
PIF_VALUE: 1
PIF_VALUE: 15
PIF_VALUE: 2
PIF_VALUE: 0
PIF_VALUE: 16
PIF_VALUE: 18
PIF_VALUE: 0
PIF_VALUE: 0
PIF_VALUE: 12
PIF_VALUE: 1
PIF_VALUE: 13
PIF_VALUE: 33
PIF_VALUE: 13
PIF_VALUE: 18
PIF_VALUE: 18
PIF_VALUE: 12
PIF_VALUE: 19
PIF_VALUE: 0
PIF_VALUE: 16
PIF_VALUE: 16
PIF_VALUE: 12
PIF_VALUE: 1
PIF_VALUE: 15
PIF_VALUE: 18
PIF_VALUE: 16
PIF_VALUE: 15
PIF_VALUE: 4
PIF_VALUE: 17
PIF_VALUE: 15
PIF_VALUE: 3
PIF_VALUE: 5
PIF_VALUE: 17
PIF_VALUE: 13
PIF_VALUE: 15
PIF_VALUE: 13
PIF_VALUE: 15
PIF_VALUE: 18
PIF_VALUE: 0
PIF_VALUE: 18
PIF_VALUE: 13
PIF_VALUE: 20
PIF_VALUE: 15
PIF_VALUE: 13
PIF_VALUE: 18
PIF_VALUE: 19
PIF_VALUE: 13
PIF_VALUE: 15
PIF_VALUE: 12
PIF_VALUE: 1
PIF_VALUE: 17
PIF_VALUE: 18
PIF_VALUE: 0
PIF_VALUE: 12
PIF_VALUE: 20
PIF_VALUE: 1
PIF_VALUE: 15
PIF_VALUE: 18
PIF_VALUE: 1
PIF_VALUE: 18
PIF_VALUE: 1
PIF_VALUE: 16
PIF_VALUE: 15
PIF_VALUE: 13
PIF_VALUE: 17
PIF_VALUE: 14
PIF_VALUE: 15
PIF_VALUE: 1
PIF_VALUE: 16
PIF_VALUE: 0
PIF_VALUE: 18
PIF_VALUE: 16
PIF_VALUE: 19
PIF_VALUE: 12
PIF_VALUE: 16
PIF_VALUE: 0
PIF_VALUE: 12
PIF_VALUE: 15
PIF_VALUE: 15
PIF_VALUE: 16
PIF_VALUE: 1
PIF_VALUE: 12
PIF_VALUE: 0
PIF_VALUE: 0
PIF_VALUE: 13
PIF_VALUE: 18
PIF_VALUE: 16
PIF_VALUE: 4
PIF_VALUE: 14
PIF_VALUE: 14
PIF_VALUE: 1
PIF_VALUE: 1
PIF_VALUE: 7
PIF_VALUE: 12
PIF_VALUE: 20
PIF_VALUE: 15
PIF_VALUE: 14
PIF_VALUE: 16
PIF_VALUE: 19
PIF_VALUE: 15
PIF_VALUE: 22
PIF_VALUE: 21
PIF_VALUE: 0
PIF_VALUE: 0
PIF_VALUE: 16
PIF_VALUE: 12
PIF_VALUE: 1
PIF_VALUE: 10
PIF_VALUE: 13
PIF_VALUE: 29
PIF_VALUE: 19
PIF_VALUE: 14
PIF_VALUE: 15
PIF_VALUE: 7
PIF_VALUE: 17
PIF_VALUE: 15
PIF_VALUE: 17
PIF_VALUE: 15
PIF_VALUE: 19
PIF_VALUE: 0
PIF_VALUE: 13
PIF_VALUE: 15
PIF_VALUE: 4
PIF_VALUE: 18
PIF_VALUE: 0
PIF_VALUE: 18
PIF_VALUE: 15
PIF_VALUE: 15
PIF_VALUE: 16
PIF_VALUE: 13
PIF_VALUE: 13
PIF_VALUE: 14
PIF_VALUE: 29
PIF_VALUE: 0
PIF_VALUE: 13
PIF_VALUE: 14
PIF_VALUE: 13
PIF_VALUE: 15
PIF_VALUE: 15
PIF_VALUE: 12
PIF_VALUE: 18
PIF_VALUE: 15
PIF_VALUE: 14
PIF_VALUE: 13
PIF_VALUE: 0
PIF_VALUE: 29
PIF_VALUE: 17
PIF_VALUE: 7
PIF_VALUE: 3
PIF_VALUE: 15
PIF_VALUE: 12
PIF_VALUE: 15
PIF_VALUE: 0
PIF_VALUE: 13
PIF_VALUE: 12
PIF_VALUE: 4
PIF_VALUE: 16
PIF_VALUE: 18
PIF_VALUE: 14
PIF_VALUE: 13
PIF_VALUE: 4
PIF_VALUE: 15
PIF_VALUE: 0
PIF_VALUE: 0
PIF_VALUE: 19
PIF_VALUE: 14
PIF_VALUE: 15
PIF_VALUE: 2
PIF_VALUE: 15
PIF_VALUE: 17
PIF_VALUE: 13
PIF_VALUE: 1
PIF_VALUE: 1
PIF_VALUE: 14
PIF_VALUE: 15
PIF_VALUE: 32
PIF_VALUE: 6
PIF_VALUE: 13
PIF_VALUE: 16
PIF_VALUE: 5
PIF_VALUE: 0
PIF_VALUE: 18
PIF_VALUE: 13
PIF_VALUE: 13
PIF_VALUE: 16
PIF_VALUE: 0
PIF_VALUE: 14
PIF_VALUE: 2
PIF_VALUE: 3
PIF_VALUE: 16
PIF_VALUE: 15
PIF_VALUE: 16
PIF_VALUE: 2
PIF_VALUE: 12
PIF_VALUE: 13
PIF_VALUE: 0
PIF_VALUE: 0
PIF_VALUE: 18
PIF_VALUE: 7
PIF_VALUE: 15
PIF_VALUE: 17
PIF_VALUE: 15
PIF_VALUE: 16
PIF_VALUE: 30
PIF_VALUE: 14
PIF_VALUE: 16
PIF_VALUE: 17
PIF_VALUE: 15
PIF_VALUE: 4
PIF_VALUE: 15
PIF_VALUE: 4
PIF_VALUE: 17
PIF_VALUE: 14
PIF_VALUE: 0
PIF_VALUE: 2
PIF_VALUE: 16
PIF_VALUE: 0
PIF_VALUE: 16
PIF_VALUE: 0
PIF_VALUE: 0
PIF_VALUE: 13
PIF_VALUE: 17
PIF_VALUE: 18
PIF_VALUE: 18
PIF_VALUE: 0
PIF_VALUE: 15
PIF_VALUE: 18
PIF_VALUE: 12
PIF_VALUE: 13
PIF_VALUE: 8
PIF_VALUE: 17
PIF_VALUE: 15
PIF_VALUE: 16
PIF_VALUE: 0
PIF_VALUE: 13
PIF_VALUE: 14
PIF_VALUE: 15
PIF_VALUE: 13
PIF_VALUE: 16
PIF_VALUE: 12
PIF_VALUE: 14
PIF_VALUE: 12
PIF_VALUE: 13
PIF_VALUE: 3
PIF_VALUE: 16
PIF_VALUE: 14
PIF_VALUE: 27
PIF_VALUE: 15
PIF_VALUE: 14
PIF_VALUE: 14
PIF_VALUE: 13
PIF_VALUE: 0
PIF_VALUE: 0
PIF_VALUE: 29
PIF_VALUE: 0
PIF_VALUE: 16
PIF_VALUE: 15
PIF_VALUE: 0
PIF_VALUE: 13
PIF_VALUE: 17
PIF_VALUE: 0
PIF_VALUE: 15
PIF_VALUE: 15
PIF_VALUE: 0
PIF_VALUE: 18
PIF_VALUE: 14
PIF_VALUE: 0
PIF_VALUE: 0
PIF_VALUE: 15
PIF_VALUE: 12
PIF_VALUE: 18
PIF_VALUE: 0
PIF_VALUE: 32
PIF_VALUE: 13
PIF_VALUE: 17
PIF_VALUE: 15
PIF_VALUE: 12
PIF_VALUE: 13
PIF_VALUE: 31
PIF_VALUE: 27
PIF_VALUE: 15
PIF_VALUE: 14
PIF_VALUE: 15
PIF_VALUE: 15
PIF_VALUE: 14
PIF_VALUE: 15
PIF_VALUE: 0
PIF_VALUE: 1
PIF_VALUE: 0
PIF_VALUE: 15
PIF_VALUE: 15
PIF_VALUE: 18
PIF_VALUE: 16
PIF_VALUE: 18
PIF_VALUE: 13
PIF_VALUE: 14
PIF_VALUE: 16
PIF_VALUE: 18
PIF_VALUE: 0
PIF_VALUE: 13

## 2021-01-01 ASSESSMENT — ENCOUNTER SYMPTOMS
BACK PAIN: 1
SHORTNESS OF BREATH: 1
CONSTIPATION: 0
RHINORRHEA: 0
SHORTNESS OF BREATH: 0
SHORTNESS OF BREATH: 0
DIARRHEA: 1
DIARRHEA: 0
COUGH: 1
RESPIRATORY NEGATIVE: 1
ABDOMINAL PAIN: 1
EYES NEGATIVE: 1
RESPIRATORY NEGATIVE: 1
BLOOD IN STOOL: 0
BACK PAIN: 0
ABDOMINAL PAIN: 0

## 2021-01-01 ASSESSMENT — PAIN SCALES - GENERAL
PAINLEVEL_OUTOF10: 5
PAINLEVEL_OUTOF10: 0
PAINLEVEL_OUTOF10: 0
PAINLEVEL_OUTOF10: 2
PAINLEVEL_OUTOF10: 0
PAINLEVEL_OUTOF10: 8
PAINLEVEL_OUTOF10: 0
PAINLEVEL_OUTOF10: 0
PAINLEVEL_OUTOF10: 5
PAINLEVEL_OUTOF10: 2
PAINLEVEL_OUTOF10: 9
PAINLEVEL_OUTOF10: 5
PAINLEVEL_OUTOF10: 1
PAINLEVEL_OUTOF10: 0
PAINLEVEL_OUTOF10: 7
PAINLEVEL_OUTOF10: 0
PAINLEVEL_OUTOF10: 5
PAINLEVEL_OUTOF10: 4
PAINLEVEL_OUTOF10: 7
PAINLEVEL_OUTOF10: 5
PAINLEVEL_OUTOF10: 0
PAINLEVEL_OUTOF10: 3
PAINLEVEL_OUTOF10: 1
PAINLEVEL_OUTOF10: 3
PAINLEVEL_OUTOF10: 2
PAINLEVEL_OUTOF10: 4
PAINLEVEL_OUTOF10: 5
PAINLEVEL_OUTOF10: 3
PAINLEVEL_OUTOF10: 0
PAINLEVEL_OUTOF10: 6
PAINLEVEL_OUTOF10: 0
PAINLEVEL_OUTOF10: 9
PAINLEVEL_OUTOF10: 0
PAINLEVEL_OUTOF10: 4
PAINLEVEL_OUTOF10: 0
PAINLEVEL_OUTOF10: 0
PAINLEVEL_OUTOF10: 4
PAINLEVEL_OUTOF10: 0
PAINLEVEL_OUTOF10: 3
PAINLEVEL_OUTOF10: 5
PAINLEVEL_OUTOF10: 5
PAINLEVEL_OUTOF10: 0
PAINLEVEL_OUTOF10: 10
PAINLEVEL_OUTOF10: 9
PAINLEVEL_OUTOF10: 0
PAINLEVEL_OUTOF10: 6
PAINLEVEL_OUTOF10: 7
PAINLEVEL_OUTOF10: 0
PAINLEVEL_OUTOF10: 2
PAINLEVEL_OUTOF10: 0
PAINLEVEL_OUTOF10: 3
PAINLEVEL_OUTOF10: 7
PAINLEVEL_OUTOF10: 3
PAINLEVEL_OUTOF10: 0
PAINLEVEL_OUTOF10: 10
PAINLEVEL_OUTOF10: 4
PAINLEVEL_OUTOF10: 6
PAINLEVEL_OUTOF10: 6
PAINLEVEL_OUTOF10: 4
PAINLEVEL_OUTOF10: 5

## 2021-01-01 ASSESSMENT — PAIN DESCRIPTION - ORIENTATION
ORIENTATION: RIGHT;UPPER
ORIENTATION: UPPER

## 2021-01-01 ASSESSMENT — PAIN DESCRIPTION - ONSET
ONSET: ON-GOING
ONSET: SUDDEN

## 2021-01-01 ASSESSMENT — LIFESTYLE VARIABLES
SMOKING_STATUS: 0
SMOKING_STATUS: 0

## 2021-01-01 ASSESSMENT — PATIENT HEALTH QUESTIONNAIRE - PHQ9
SUM OF ALL RESPONSES TO PHQ QUESTIONS 1-9: 0
2. FEELING DOWN, DEPRESSED OR HOPELESS: 0
SUM OF ALL RESPONSES TO PHQ QUESTIONS 1-9: 0
2. FEELING DOWN, DEPRESSED OR HOPELESS: 0
SUM OF ALL RESPONSES TO PHQ QUESTIONS 1-9: 0
2. FEELING DOWN, DEPRESSED OR HOPELESS: 0
SUM OF ALL RESPONSES TO PHQ QUESTIONS 1-9: 0
1. LITTLE INTEREST OR PLEASURE IN DOING THINGS: 0
SUM OF ALL RESPONSES TO PHQ9 QUESTIONS 1 & 2: 0
2. FEELING DOWN, DEPRESSED OR HOPELESS: 0
SUM OF ALL RESPONSES TO PHQ QUESTIONS 1-9: 0
SUM OF ALL RESPONSES TO PHQ9 QUESTIONS 1 & 2: 0
SUM OF ALL RESPONSES TO PHQ9 QUESTIONS 1 & 2: 0
1. LITTLE INTEREST OR PLEASURE IN DOING THINGS: 0
SUM OF ALL RESPONSES TO PHQ9 QUESTIONS 1 & 2: 0
SUM OF ALL RESPONSES TO PHQ QUESTIONS 1-9: 0
SUM OF ALL RESPONSES TO PHQ QUESTIONS 1-9: 0
1. LITTLE INTEREST OR PLEASURE IN DOING THINGS: 0

## 2021-01-01 ASSESSMENT — PAIN DESCRIPTION - LOCATION
LOCATION: ABDOMEN
LOCATION: ABDOMEN
LOCATION: BACK
LOCATION: ABDOMEN
LOCATION: BACK
LOCATION: BACK
LOCATION: ABDOMEN

## 2021-01-01 ASSESSMENT — PAIN DESCRIPTION - PAIN TYPE
TYPE: CHRONIC PAIN
TYPE: ACUTE PAIN;SURGICAL PAIN
TYPE: SURGICAL PAIN
TYPE: ACUTE PAIN;SURGICAL PAIN

## 2021-01-01 ASSESSMENT — PAIN SCALES - WONG BAKER
WONGBAKER_NUMERICALRESPONSE: 0
WONGBAKER_NUMERICALRESPONSE: 0

## 2021-01-01 ASSESSMENT — PAIN - FUNCTIONAL ASSESSMENT
PAIN_FUNCTIONAL_ASSESSMENT: 0-10

## 2021-01-01 ASSESSMENT — PAIN DESCRIPTION - FREQUENCY: FREQUENCY: INTERMITTENT

## 2021-03-16 NOTE — PATIENT INSTRUCTIONS
Call Dr Dilcia Ho. [phone] and set up an appointment    Take the acid blocker medication to get started.

## 2021-03-16 NOTE — PROGRESS NOTES
Has \" heartburn\". Past couple of months. Seems in his upper esophagus / throat. Has to be careful chewing and eating dry foods. No pain to swallow. No nocturnal sx. Had a lot of sx many years ago and then had Fundoplication and seemed to fix the issues. Has not tried any acid reducer meds. Subjective:      Patient ID: Yaneth Sánchez is a 76 y.o. y.o. male.       HPI      Chief Complaint   Patient presents with    Heartburn     mostly in throat        Allergies   Allergen Reactions    Clarithromycin     Gabapentin     Pravastatin     Promethazine-Codeine     Vicodin [Hydrocodone-Acetaminophen]      Causes prostate swelling- needs to take terazosin if takes vicodin    Zegerid [Omeprazole]        Past Medical History:   Diagnosis Date    Arthritis     GERD (gastroesophageal reflux disease)     resolved with surgery    Hiatal hernia     Hypertension        Past Surgical History:   Procedure Laterality Date    COLONOSCOPY  10/29/2013    FOREARM SURGERY Left 10/04/2017    excision left forearm mass    GASTRIC FUNDOPLICATION  feb 2285    HERNIA REPAIR  02/19/2015    ROBOTIC ASSISTED HIATAL HERNIA REPAIR WITH NISSEN    JOINT REPLACEMENT Left 01/31/13    left total knee replacement    KNEE ARTHROSCOPY      left    TOTAL KNEE ARTHROPLASTY Right 10/22/2020    RIGHT TOTAL KNEE REPLACEMENT      CELAYA & NEPHEW performed by Justyn Montes MD at [address] [place] [address]  2015    bx    VASECTOMY         Social History     Socioeconomic History    Marital status:      Spouse name: Not on file    Number of children: Not on file    Years of education: Not on file    Highest education level: Not on file   Occupational History    Not on file   Social Needs    Financial resource strain: Not on file    Food insecurity     Worry: Not on file     Inability: Not on file    Transportation needs     Medical: Not on file     Non-medical: Not on file   Tobacco Use    Smoking status: Former Smoker     Packs/day: 0.50     Years: 17.00     Pack years: 8.50     Quit date: 10/21/1976     Years since quittin.4    Smokeless tobacco: Never Used   Substance and Sexual Activity    Alcohol use: No     Alcohol/week: 0.0 standard drinks    Drug use: No    Sexual activity: Not Currently   Lifestyle    Physical activity     Days per week: Not on file     Minutes per session: Not on file    Stress: Not on file   Relationships    Social connections     Talks on phone: Not on file     Gets together: Not on file     Attends [employer] service: Not on file     Active member of club or organization: Not on file     Attends meetings of clubs or organizations: Not on file     Relationship status: Not on file    Intimate partner violence     Fear of current or ex partner: Not on file     Emotionally abused: Not on file     Physically abused: Not on file     Forced sexual activity: Not on file   Other Topics Concern    Not on file   Social History Narrative    Not on file       Family History   Problem Relation Age of Onset    High Blood Pressure Mother     High Blood Pressure Father     Cancer Father         lung    High Blood Pressure Sister     High Blood Pressure Brother        Vitals:    21 1050   BP: 138/78   Pulse: 54   Resp: 14   Temp: 98.4 °F (36.9 °C)   SpO2: 94%       Wt Readings from Last 3 Encounters:   21 149 lb (67.6 kg)   20 147 lb (66.7 kg)   20 147 lb (66.7 kg)       Review of Systems   Constitutional: Negative for activity change, appetite change and unexpected weight change. Respiratory: Negative. Cardiovascular: Negative. Gastrointestinal: Negative for constipation and diarrhea. Chay Danas like sx   Skin:        Right lateral knee area - thick and plaque like area dermatitis. Suni Rockland    3-4 cm square       Objective:   Physical Exam  Constitutional:       Comments: Has some recurring tickle like cough   Eyes: General: No scleral icterus. Cardiovascular:      Rate and Rhythm: Normal rate and regular rhythm. Pulmonary:      Effort: Pulmonary effort is normal.      Breath sounds: Normal breath sounds. Abdominal:      General: Bowel sounds are normal. There is no distension. Palpations: Abdomen is soft. There is no mass. Tenderness: There is no abdominal tenderness. Musculoskeletal:      Right lower leg: No edema. Left lower leg: No edema. Lymphadenopathy:      Cervical: No cervical adenopathy. Skin:     Findings: Rash present. Comments: Thick plaque like area right lateral knee   Neurological:      Mental Status: He is alert. Assessment:      GERD  Esophagitis            Plan:     Restart an acid blocker. Call Dr Pearl Evans and get checked. May need scope. Observe the patch of skin for enlargement and advise me. Current Outpatient Medications   Medication Sig Dispense Refill    sildenafil (VIAGRA) 100 MG tablet TAKE ONE TABLET BY MOUTH AS NEEDED FOR ERECTILE DYSFUNCTION **ONLY TAKE ONCE IN 24 HOURS** 6 tablet 1    lisinopril-hydroCHLOROthiazide (PRINZIDE;ZESTORETIC) 10-12.5 MG per tablet TAKE ONE TABLET BY MOUTH DAILY FOR HIGH BLOOD PRESSURE 90 tablet 3     No current facility-administered medications for this visit.

## 2021-05-10 NOTE — PROGRESS NOTES
Subjective:      Patient ID: Dyan Castro is a 76 y.o. y.o. male. Rash on his knee. -  Right     Since November    Larger spot and has a satellite area developing. Not really pruritic.       HPI      Chief Complaint   Patient presents with    Rash     Right Knee not going away for several months       Allergies   Allergen Reactions    Clarithromycin     Gabapentin     Pravastatin     Promethazine-Codeine     Vicodin [Hydrocodone-Acetaminophen]      Causes prostate swelling- needs to take terazosin if takes vicodin    Zegerid [Omeprazole]        Past Medical History:   Diagnosis Date    Arthritis     GERD (gastroesophageal reflux disease)     resolved with surgery    Hiatal hernia     Hypertension        Past Surgical History:   Procedure Laterality Date    COLONOSCOPY  10/29/2013    FOREARM SURGERY Left 10/04/2017    excision left forearm mass    GASTRIC FUNDOPLICATION  feb 9989    HERNIA REPAIR  02/19/2015    ROBOTIC ASSISTED HIATAL HERNIA REPAIR WITH NISSEN    JOINT REPLACEMENT Left 01/31/13    left total knee replacement    KNEE ARTHROSCOPY      left    TOTAL KNEE ARTHROPLASTY Right 10/22/2020    RIGHT TOTAL KNEE REPLACEMENT      CELAYA & NEPHEW performed by Cecelia Villafuerte MD at [address] [place] [address]  2015    bx    VASECTOMY         Social History     Socioeconomic History    Marital status:      Spouse name: Not on file    Number of children: Not on file    Years of education: Not on file    Highest education level: Not on file   Occupational History    Not on file   Social Needs    Financial resource strain: Not on file    Food insecurity     Worry: Not on file     Inability: Not on file    Transportation needs     Medical: Not on file     Non-medical: Not on file   Tobacco Use    Smoking status: Former Smoker     Packs/day: 0.50     Years: 17.00     Pack years: 8.50     Quit date: 10/21/1976     Years since aspirin 81 MG EC tablet Take 81 mg by mouth daily      lisinopril-hydroCHLOROthiazide (PRINZIDE;ZESTORETIC) 10-12.5 MG per tablet TAKE ONE TABLET BY MOUTH DAILY 90 tablet 2    sildenafil (VIAGRA) 100 MG tablet TAKE ONE TABLET BY MOUTH AS NEEDED FOR ERECTILE DYSFUNCTION **ONLY TAKE ONCE IN 24 HOURS** 6 tablet 1     No current facility-administered medications for this visit.

## 2021-06-14 NOTE — PATIENT INSTRUCTIONS
Continue the same BP medication.     Follow 6 months    Call if the bowels does not get back to normal

## 2021-06-14 NOTE — PROGRESS NOTES
Subjective:      Patient ID: Yanni Francois is a 76 y.o. y.o. male. Here for annual  There is no interval hx of hospitalization or significant illness    Has overall been doing well.     HPI      Chief Complaint   Patient presents with    Annual Exam       Allergies   Allergen Reactions    Clarithromycin     Gabapentin     Pravastatin     Promethazine-Codeine     Vicodin [Hydrocodone-Acetaminophen]      Causes prostate swelling- needs to take terazosin if takes vicodin    Zegerid [Omeprazole]        Past Medical History:   Diagnosis Date    Arthritis     GERD (gastroesophageal reflux disease)     resolved with surgery    Hiatal hernia     Hypertension        Past Surgical History:   Procedure Laterality Date    COLONOSCOPY  10/29/2013    FOREARM SURGERY Left 10/04/2017    excision left forearm mass    GASTRIC FUNDOPLICATION  2312    HERNIA REPAIR  2015    ROBOTIC ASSISTED HIATAL HERNIA REPAIR WITH NISSEN    JOINT REPLACEMENT Left 13    left total knee replacement    KNEE ARTHROSCOPY      left    TOTAL KNEE ARTHROPLASTY Right 10/22/2020    RIGHT TOTAL KNEE REPLACEMENT      CELAYA & NEPHEW performed by Farhad Frausto MD at [place] [address]  2015    bx    VASECTOMY         Social History     Socioeconomic History    Marital status:      Spouse name: Not on file    Number of children: Not on file    Years of education: Not on file    Highest education level: Not on file   Occupational History    Not on file   Tobacco Use    Smoking status: Former Smoker     Packs/day: 0.50     Years: 17.00     Pack years: 8.50     Quit date: 10/21/1976     Years since quittin.6    Smokeless tobacco: Never Used   Vaping Use    Vaping Use: Never used   Substance and Sexual Activity    Alcohol use: No     Alcohol/week: 0.0 standard drinks    Drug use: No    Sexual activity: Not Currently   Other Topics Concern    Not on file   Social History Narrative    Not on file     Social Determinants of Health     Financial Resource Strain:     Difficulty of Paying Living Expenses:    Food Insecurity:     Worried About Running Out of Food in the Last Year:     [address] in the Last Year:    Transportation Needs:     Lack of Transportation (Medical):  Lack of Transportation (Non-Medical):    Physical Activity:     Days of Exercise per Week:     Minutes of Exercise per Session:    Stress:     Feeling of Stress :    Social Connections:     Frequency of Communication with Friends and Family:     Frequency of Social Gatherings with Friends and Family:     Attends [employer] Services:     Active Member of Clubs or Organizations:     Attends Club or Organization Meetings:     Marital Status:    Intimate Partner Violence:     Fear of Current or Ex-Partner:     Emotionally Abused:     Physically Abused:     Sexually Abused:        Family History   Problem Relation Age of Onset    High Blood Pressure Mother     High Blood Pressure Father     Cancer Father         lung    High Blood Pressure Sister     High Blood Pressure Brother        Vitals:    06/14/21 1021   BP: 120/66   Pulse: 68   Resp: 14   SpO2: 97%       Wt Readings from Last 3 Encounters:   06/14/21 144 lb (65.3 kg)   05/10/21 149 lb (67.6 kg)   03/16/21 149 lb (67.6 kg)       Review of Systems   Constitutional: Negative for unexpected weight change. HENT: Negative. Eyes: Negative. Respiratory: Negative. Cardiovascular: Negative. Gastrointestinal:        Had EGD. Dr Andreea Menezes. Scope without lesion. Esophagitis  Gave him Protonix. Throat and swallowing sx resolved but the Protonix gave him GI upset and diarrhea. Stopped the meds and sx are resolving. No sx or gerd or reflux  No dysphagia   Genitourinary: Negative for dysuria. Nocturia occ.   Stream decent   Musculoskeletal:        Occ lower back if lifts or does too much Neurological: Negative for seizures, syncope, facial asymmetry, speech difficulty and numbness. Hematological: Does not bruise/bleed easily. Psychiatric/Behavioral: Negative for dysphoric mood, self-injury and suicidal ideas. Objective:   Physical Exam  Constitutional:       Appearance: He is not ill-appearing. HENT:      Right Ear: Tympanic membrane normal.      Left Ear: Tympanic membrane normal.      Ears:      Comments: Hearing aids,  Some hearing loss     Mouth/Throat:      Mouth: Mucous membranes are moist.   Eyes:      General: No scleral icterus. Extraocular Movements: Extraocular movements intact. Pupils: Pupils are equal, round, and reactive to light. Neck:      Vascular: No carotid bruit. Cardiovascular:      Rate and Rhythm: Normal rate and regular rhythm. Pulses: Normal pulses. Heart sounds: Normal heart sounds. Pulmonary:      Effort: Pulmonary effort is normal.      Breath sounds: Normal breath sounds. Abdominal:      General: Abdomen is flat. There is no distension. Palpations: There is no mass. Tenderness: There is no abdominal tenderness. Hernia: No hernia is present. Musculoskeletal:      Right lower leg: No edema. Left lower leg: No edema. Lymphadenopathy:      Cervical: No cervical adenopathy. Skin:     General: Skin is warm and dry. Comments: Patch of dry skin right knee / lower leg is better. Not scaly or rough. some coloration change still but better   Neurological:      Mental Status: He is alert. Psychiatric:         Mood and Affect: Mood normal.         Behavior: Behavior normal.         Thought Content: Thought content normal.         Assessment:      Hypertension  Hearing loss  GERD          Plan:   If the bowels do not return to normal- call me  Call if throat / GI questions    Discussed the meds,  Labs in the fall were OK. Post op anemia some from the total knee. Continue the same meds.     Get labs in 4-6 months. Current Outpatient Medications   Medication Sig Dispense Refill    sildenafil (VIAGRA) 100 MG tablet TAKE ONE TABLET BY MOUTH AS NEEDED FOR ERECTILE DYSFUNCTION **NO MORE THAN ONE TABLET IN 24 HOURS** 6 tablet 3    fluocinonide (LIDEX) 0.05 % cream Apply topically 2 times daily. 30 g 1    aspirin 81 MG EC tablet Take 81 mg by mouth daily      lisinopril-hydroCHLOROthiazide (PRINZIDE;ZESTORETIC) 10-12.5 MG per tablet TAKE ONE TABLET BY MOUTH DAILY 90 tablet 2    pantoprazole sodium (PROTONIX) 40 MG PACK packet Take 40 mg by mouth every morning (before breakfast) (Patient not taking: Reported on 6/14/2021)      calcipotriene (DOVONEX) 0.005 % cream Apply topically 2 times daily. (Patient not taking: Reported on 6/14/2021) 1 Tube 4     No current facility-administered medications for this visit.

## 2021-06-28 NOTE — PROGRESS NOTES
Subjective:      Patient ID: Petra Genao is a 76 y.o. y.o. male. Having change in bowels. Several stools a day. Feels weaker and some fatigue feeling    Last colonoscopy 8 years.   He is on 10 year follow    Weight loss    Dry cough x 2 days  Less exercise tolerance- steps and if working outside less tolerance    HPI      Chief Complaint   Patient presents with    Abdominal Pain     unusual bowel movements       Allergies   Allergen Reactions    Clarithromycin     Gabapentin     Pravastatin     Promethazine-Codeine     Vicodin [Hydrocodone-Acetaminophen]      Causes prostate swelling- needs to take terazosin if takes vicodin    Zegerid [Omeprazole]        Past Medical History:   Diagnosis Date    Arthritis     GERD (gastroesophageal reflux disease)     resolved with surgery    Hiatal hernia     Hypertension        Past Surgical History:   Procedure Laterality Date    COLONOSCOPY  10/29/2013    FOREARM SURGERY Left 10/04/2017    excision left forearm mass    GASTRIC FUNDOPLICATION  845    HERNIA REPAIR  2015    ROBOTIC ASSISTED HIATAL HERNIA REPAIR WITH NISSEN    JOINT REPLACEMENT Left 13    left total knee replacement    KNEE ARTHROSCOPY      left    TOTAL KNEE ARTHROPLASTY Right 10/22/2020    RIGHT TOTAL KNEE REPLACEMENT      CELAYA & NEPHEW performed by Gudelia Hough MD at [address] [place] [address]      bx    VASECTOMY         Social History     Socioeconomic History    Marital status:      Spouse name: Not on file    Number of children: Not on file    Years of education: Not on file    Highest education level: Not on file   Occupational History    Not on file   Tobacco Use    Smoking status: Former Smoker     Packs/day: 0.50     Years: 17.00     Pack years: 8.50     Quit date: 10/21/1976     Years since quittin.7    Smokeless tobacco: Never Used   Vaping Use    Vaping Use: Never used Substance and Sexual Activity    Alcohol use: No     Alcohol/week: 0.0 standard drinks    Drug use: No    Sexual activity: Not Currently   Other Topics Concern    Not on file   Social History Narrative    Not on file     Social Determinants of Health     Financial Resource Strain:     Difficulty of Paying Living Expenses:    Food Insecurity:     Worried About Running Out of Food in the Last Year:     Ran Out of Food in the Last Year:    Transportation Needs:     Lack of Transportation (Medical):  Lack of Transportation (Non-Medical):    Physical Activity:     Days of Exercise per Week:     Minutes of Exercise per Session:    Stress:     Feeling of Stress :    Social Connections:     Frequency of Communication with Friends and Family:     Frequency of Social Gatherings with Friends and Family:     Attends Spiritism Services:     Active Member of Clubs or Organizations:     Attends Club or Organization Meetings:     Marital Status:    Intimate Partner Violence:     Fear of Current or Ex-Partner:     Emotionally Abused:     Physically Abused:     Sexually Abused:        Family History   Problem Relation Age of Onset    High Blood Pressure Mother     High Blood Pressure Father     Cancer Father         lung    High Blood Pressure Sister     High Blood Pressure Brother        Vitals:    06/28/21 0934   BP: 136/66   Pulse: 59   Resp: 14   SpO2: 98%       Wt Readings from Last 3 Encounters:   06/28/21 141 lb (64 kg)   06/14/21 144 lb (65.3 kg)   05/10/21 149 lb (67.6 kg)       Review of Systems   Constitutional: Positive for unexpected weight change. Respiratory: Positive for cough and shortness of breath. Dry cough and chest sx 2-3 days   Cardiovascular: Negative for leg swelling. Gastrointestinal: Positive for abdominal pain and diarrhea. Negative for blood in stool. Upper abd pain,  Sometimes pretty severe.   Eating effects      No melena or blood stools Genitourinary: Negative for hematuria and urgency. No dramatic LUT sx,  Nocturia occ. Musculoskeletal: Positive for back pain. Lower back ache / pain,  Kind of variable   Neurological: Negative. Objective:   Physical Exam  Vitals reviewed. Constitutional:       Appearance: He is not ill-appearing. Comments: Dry cough, mild   Eyes:      General: No scleral icterus. Cardiovascular:      Rate and Rhythm: Normal rate and regular rhythm. Heart sounds: No murmur heard. Pulmonary:      Breath sounds: No wheezing or rhonchi. Comments: Grossly clear chest,  No dullness / wheeze  Abdominal:      Palpations: Abdomen is soft. There is no mass. Hernia: No hernia is present. Comments: Non rebound upper abdomen. No mass   Musculoskeletal:      Right lower leg: No edema. Left lower leg: No edema. Lymphadenopathy:      Cervical: No cervical adenopathy. Neurological:      Mental Status: He is alert and oriented to person, place, and time. Psychiatric:         Mood and Affect: Mood normal.         Behavior: Behavior normal.         Thought Content: Thought content normal.         Assessment:   Abdominal pain, right upper abdomen  Loss  Diarrhea  Cough      Plan:     Cbc, cmp, amylase, TSH,  PSA    CXR    CT abdomen / pelvis        Current Outpatient Medications   Medication Sig Dispense Refill    sildenafil (VIAGRA) 100 MG tablet TAKE ONE TABLET BY MOUTH AS NEEDED FOR ERECTILE DYSFUNCTION **NO MORE THAN ONE TABLET IN 24 HOURS** 6 tablet 3    aspirin 81 MG EC tablet Take 81 mg by mouth daily      lisinopril-hydroCHLOROthiazide (PRINZIDE;ZESTORETIC) 10-12.5 MG per tablet TAKE ONE TABLET BY MOUTH DAILY 90 tablet 2     No current facility-administered medications for this visit.

## 2021-06-29 NOTE — TELEPHONE ENCOUNTER
Patient called in stating you told him to call and ask about his xrays and lab work. It is complete in Epic. Thanks

## 2021-06-29 NOTE — TELEPHONE ENCOUNTER
Spoke to the pt. CXR and labs reviewed. Abnormal LFT  Needs CT,  He will call and schedule.     Empiric abx for the cough and abnormal CXR

## 2021-07-02 NOTE — TELEPHONE ENCOUNTER
Spoke to the patient earlier. CT reviewed and he needs referral to GI. He has seen Dr. Kalani Nolasco office and I spoke to them, and they were going to call him and get him set up to see the providers in that office who can do the esophagogastroduodenoscopy/ultrasound biopsy and common bile duct stenting/ERCP. Patient advised.

## 2021-07-06 NOTE — TELEPHONE ENCOUNTER
Patient would like call back from provider to discuss medication he was put on last week .  Patient states it doesn't appear to be working and wants to discuss other options

## 2021-07-07 NOTE — TELEPHONE ENCOUNTER
Spoke to the patient. Cough and phlegm not better. Stop Augmentin and replace with Doxy.   He is having an esophagogastroduodenoscopy/ERCP and stent and probable ultrasound biopsy tomorrow, at [facility] with Roetta Crigler

## 2021-07-07 NOTE — ANESTHESIA PRE PROCEDURE
Department of Anesthesiology  Preprocedure Note       Name:  Yeimy Saba   Age:  76 y.o.  :  1945                                          MRN:  [id number]         Date:  2021      Surgeon: Sadiq Bryant):  Eulalia Quinn MD    Procedure: Procedure(s):  ERCP WF W/UPPER EUS W/ METAL BILIARY STENT PLACEMENT W/ANES. UPPER EUS W/ANES. Medications prior to admission:   Prior to Admission medications    Medication Sig Start Date End Date Taking? Authorizing Provider   doxycycline hyclate (VIBRA-TABS) 100 MG tablet Take 1 tablet by mouth 2 times daily Take with food 21  Yes Pippa Escoto DO   aspirin 81 MG EC tablet Take 81 mg by mouth daily   Yes Historical Provider, MD   lisinopril-hydroCHLOROthiazide (PRINZIDE;ZESTORETIC) 10-12.5 MG per tablet TAKE ONE TABLET BY MOUTH DAILY 21  Yes Pippa Escoto DO   sildenafil (VIAGRA) 100 MG tablet TAKE ONE TABLET BY MOUTH AS NEEDED FOR ERECTILE DYSFUNCTION **NO MORE THAN ONE TABLET IN 24 HOURS** 21   Pippa Escoto DO       Current medications:    Current Facility-Administered Medications   Medication Dose Route Frequency Provider Last Rate Last Admin    lactated ringers infusion   Intravenous Continuous Miguel A Morales MD           Allergies:     Allergies   Allergen Reactions    Clarithromycin     Gabapentin     Pravastatin     Promethazine-Codeine     Vicodin [Hydrocodone-Acetaminophen]      Causes prostate swelling- needs to take terazosin if takes vicodin    Zegerid [Omeprazole]        Problem List:    Patient Active Problem List   Diagnosis Code    Left TKR Z96.659    HTN (hypertension) I10    GERD (gastroesophageal reflux disease) K21.9    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    Cervical radiculitis M54.12    Ulnar neuropathy of left upper extremity G56.22    Left wrist pain M25.532    Mass of left wrist R22.32    Thoracic or lumbosacral neuritis or radiculitis, unspecified [id number]    Syncope and collapse R55    Other cervical disc degeneration at C6-C7 level M50.323    Other cervical disc degeneration at C4-C5 level M50.321    Hyperlipidemia E78.5    Arthritis of right hand M19.041    Status post total right knee replacement Z96.651    S/P total knee arthroplasty, right Z96.651    Lumbosacral spondylosis without myelopathy M47.817       Past Medical History:        Diagnosis Date    Arthritis     GERD (gastroesophageal reflux disease)     resolved with surgery    Hiatal hernia     Hypertension        Past Surgical History:        Procedure Laterality Date    COLONOSCOPY  10/29/2013    FOREARM SURGERY Left 10/04/2017    excision left forearm mass    GASTRIC FUNDOPLICATION  7313    HERNIA REPAIR  2015    ROBOTIC ASSISTED HIATAL HERNIA REPAIR WITH NISSEN    JOINT REPLACEMENT Left 13    left total knee replacement    KNEE ARTHROSCOPY      left    TOTAL KNEE ARTHROPLASTY Right 10/22/2020    RIGHT TOTAL KNEE REPLACEMENT      CELAYA & NEPHEW performed by Batsheva Kumar MD at [place] [address]      bx    VASECTOMY         Social History:    Social History     Tobacco Use    Smoking status: Former Smoker     Packs/day: 0.50     Years: 17.00     Pack years: 8.50     Quit date: 10/21/1976     Years since quittin.7    Smokeless tobacco: Never Used   Substance Use Topics    Alcohol use: No     Alcohol/week: 0.0 standard drinks                                Counseling given: Not Answered      Vital Signs (Current):   Vitals:    21 0931   BP: (!) 163/68   Pulse: 60   Resp: 16   Temp: 96.8 °F (36 °C)   TempSrc: Temporal   SpO2: 99%   Weight: 137 lb (62.1 kg)   Height: 5' 6\" (1.676 m)                                              BP Readings from Last 3 Encounters:   21 (!) 163/68   21 136/66   21 120/66       NPO Status: Time of last liquid consumption:  exam  breath sounds clear to auscultation                            ROS comment: Recent pneumonia   Cardiovascular:    (+) hypertension:,         Rhythm: regular  Rate: normal                    Neuro/Psych:   (+) neuromuscular disease:,             GI/Hepatic/Renal:   (+) hiatal hernia, GERD:,           Endo/Other: Negative Endo/Other ROS                    Abdominal:             Vascular: negative vascular ROS. Other Findings:             Anesthesia Plan      general     ASA 2       Induction: intravenous. MIPS: Postoperative opioids intended and Prophylactic antiemetics administered. Anesthetic plan and risks discussed with patient. Plan discussed with CRNA.                   Carina Hampton MD   7/7/2021

## 2021-07-07 NOTE — PROGRESS NOTES
Dr. Lyndsey Cassidy here to see patient. Patient c/o Right upper abdominal pain. Abdomen soft and doctor aware of patients BP. Orders received.

## 2021-07-07 NOTE — H&P
History and Physical / Pre-Sedation Assessment    Patient:  Alexandrea Patricia   :   1945     Intended Procedure:  EUS+ERCP    HPI: 76year old male with history of HTN, GERD and arthritis recently found to have pancreas mass per CT    Past Medical History:   Diagnosis Date    Arthritis     GERD (gastroesophageal reflux disease)     resolved with surgery    Hiatal hernia     Hypertension      Past Surgical History:   Procedure Laterality Date    COLONOSCOPY  10/29/2013    FOREARM SURGERY Left 10/04/2017    excision left forearm mass    GASTRIC FUNDOPLICATION  2629    HERNIA REPAIR  2015    ROBOTIC ASSISTED HIATAL HERNIA REPAIR WITH NISSEN    JOINT REPLACEMENT Left 13    left total knee replacement    KNEE ARTHROSCOPY      left    TOTAL KNEE ARTHROPLASTY Right 10/22/2020    RIGHT TOTAL KNEE REPLACEMENT      CELAYA & NEPHEW performed by Cinthya Tello MD at [address] [place] [address]  2015    bx    VASECTOMY         Medications reviewed  Prior to Admission medications    Medication Sig Start Date End Date Taking? Authorizing Provider   doxycycline hyclate (VIBRA-TABS) 100 MG tablet Take 1 tablet by mouth 2 times daily Take with food 21  Yes Leita Smoker, DO   aspirin 81 MG EC tablet Take 81 mg by mouth daily   Yes Historical Provider, MD   lisinopril-hydroCHLOROthiazide (PRINZIDE;ZESTORETIC) 10-12.5 MG per tablet TAKE ONE TABLET BY MOUTH DAILY 21  Yes Maxwellta Smoker, DO   sildenafil (VIAGRA) 100 MG tablet TAKE ONE TABLET BY MOUTH AS NEEDED FOR ERECTILE DYSFUNCTION **NO MORE THAN ONE TABLET IN 24 HOURS** 21   Leita Smoker, DO        Allergies:    Allergies   Allergen Reactions    Clarithromycin     Gabapentin     Pravastatin     Promethazine-Codeine     Vicodin [Hydrocodone-Acetaminophen]      Causes prostate swelling- needs to take terazosin if takes vicodin    Zegerid [Omeprazole]        Nurses notes reviewed and agreed. Physical Exam:  Vital Signs: BP (!) 163/68   Pulse 60   Temp 96.8 °F (36 °C) (Temporal)   Resp 16   Ht 5' 6\" (1.676 m)   Wt 137 lb (62.1 kg)   SpO2 99%   BMI 22.11 kg/m²    Airway: Mallampati: II (soft palate, uvula, fauces visible)  Pulmonary:Normal  Cardiac:Normal  Abdomen:Normal    Pre-Procedure Assessment / Plan:  ASA: Class 3 - A patient with severe systemic disease that limits activity but is not incapacitating  Level of Sedation Plan: Moderate sedation  Post Procedure plan: Return to same level of care    I assessed the patient and find that the patient is in satisfactory condition to proceed with the planned procedure and sedation plan. I have explained the risk, benefits, and alternatives to the procedure; the patient understands and agrees to proceed.        Nichole Sharp MD  7/7/2021

## 2021-07-07 NOTE — PROGRESS NOTES
Arrived from [facility] awakens easily and respirations unlabored. Report received from [employer]. Abdomen soft. No c/o at this time.

## 2021-07-07 NOTE — ANESTHESIA POSTPROCEDURE EVALUATION
Department of Anesthesiology  Postprocedure Note    Patient: Gareth Akins  MRN: [id number]  YOB: 1945  Date of evaluation: 7/7/2021  Time:  12:14 PM     Procedure Summary     Date: 07/07/21 Room / Location: [place] [address] / [facility]    Anesthesia Start: 4701 Anesthesia Stop: 2165    Procedures:       ERCP SPHINCTER/PAPILLOTOMY (N/A )      UPPER EUS W/ANES. (N/A )      ERCP STENT INSERTION      EGD BIOPSY Diagnosis: (PANCREAS MASS, JAUNDICE)    Surgeons: Suhki Kay MD Responsible Provider: Mita Anderson MD    Anesthesia Type: General ASA Status: 2          Anesthesia Type: General    Angela Phase I: Angela Score: 6    Angela Phase II:      Last vitals: Reviewed and per EMR flowsheets.        Anesthesia Post Evaluation    Patient location during evaluation: PACU  Patient participation: complete - patient participated  Level of consciousness: awake and alert  Pain score: 0  Airway patency: patent  Nausea & Vomiting: no nausea and no vomiting  Complications: no  Cardiovascular status: blood pressure returned to baseline  Respiratory status: acceptable  Hydration status: stable

## 2021-07-07 NOTE — PROGRESS NOTES
Dr. Garry Hou here to see patient. Patient moaning with abdominal pain. No relief from previous medication.  Will do labs per Dr. Garry Hou

## 2021-07-07 NOTE — BRIEF OP NOTE
Brief Postoperative Note      Patient: Coby Nicole  YOB: 1945  MRN: [id number]    Date of Procedure: 7/7/2021    Pre-Op Diagnosis: PANCREAS MASS, JAUNDICE    Post-Op Diagnosis: Same       Procedure(s):  ERCP WF W/UPPER EUS W/ METAL BILIARY STENT PLACEMENT W/ANES. UPPER EUS W/ANES. Surgeon(s):  Nichole Sharp MD    Assistant:  * No surgical staff found *    Anesthesia: Monitor Anesthesia Care    Estimated Blood Loss (mL): Minimal    Complications: None    Specimens:   ID Type Source Tests Collected by Time Destination   A :  Tissue Tissue CYTOLOGY, NON-GYN Nichole Sharp MD 7/7/2021 1034    B :  Tissue Tissue CYTOLOGY, NON-GYN Nichole Sharp MD 7/7/2021 1043        Implants:  Implant Name Type Inv.  Item Serial No.  Lot No. LRB No. Used Action   STENT BILI [id number] CATH 8FR [id number] GWIRE 0.035IN MTL  STENT BILI L60MM [id number] CATH 8FR L194CM GWIRE 0.035IN MTL  [employer]  N/A 1 Implanted         Drains: * No LDAs found *    Findings: 2.2cm pancreas mass, biliary obstruction s/p 10mmx 60mm SEMS     Electronically signed by Nichole Sharp MD on 7/7/2021 at 11:48 AM

## 2021-07-07 NOTE — PROGRESS NOTES
Spoke with Corey in Pharmacy regarding patients allergies and giving Morphine.   Patient has had Morphine in past

## 2021-07-07 NOTE — PROGRESS NOTES
Patient is now a Phase II patient. Spoke with Dr. Jai Calderón regarding discharging patient. Will wait to see lab results. Patient aware. Abdomen soft and bowel sounds auscultated .

## 2021-07-07 NOTE — OP NOTE
[address]                 [address] [place] [address]                                OPERATIVE REPORT    PATIENT NAME: Millie Castro                    :        1945  MED REC NO:   [id number]                          ROOM:  ACCOUNT NO:   [de-identified]                           ADMIT DATE: 2021  PROVIDER:     Maurice Watson MD    DATE OF PROCEDURE:  2021    PRE-PROCEDURE DIAGNOSES:  1. Abnormal imaging. 2.  Abdominal pain. 3.  Pancreas mass. PROCEDURES PERFORMED:  1.  EUS with FNA. 2.  ERCP with sphincterotomy and stent placement. POSTPROCEDURE DIAGNOSES:  1.  A 2.2 x 1.7 cm pancreas head mass. 2.  Multiple liver lesions. 3.  Dilated common bile duct mass, distended gallbladder. 4.  No evidence of celiac lymphadenopathy. PROCEDURE INDICATIONS:  This is a 79-year-old male with history of  hypertension, GERD, arthritis, recently developed progressively  worsening abdominal pain. He underwent EGD in 2021, which was  negative. He was subsequently found to have elevated LFTs. CT scan of  the abdomen and pelvis last week showed abnormal liver lesions as well  as a soft tissue mass in the pancreas body suspicious for pancreas  adenocarcinoma. Multiple pulmonary nodules were also seen suggestive of  pulmonary metastasis. He is found to be jaundiced with a bilirubin of  3. EUS and ERCP are being performed for diagnostic and therapeutic  purposes. MEDICATIONS:  MAC per Anesthesia. PROCEDURE DETAILS:  Informed consent obtained after discussing risks,  benefits and alternatives. Full history and physical was performed. The patient was classified as ASA class III. Medications were given by  Anesthesia. Cardiopulmonary status was continuously monitored  throughout the procedure. The patient underwent endotracheal intubation  for airway protection. The patient was placed in left lateral decubitus  position.   Once adequately positioned, a standard therapeutic  duodenoscope was inserted in the mouth and advanced under direct  visualization to the second portion of the duodenum. The entire mucosa  of the esophagus, stomach, and duodenum were examined carefully. Ultrasound images of the mediastinum in the AP window and subcarinal  space were performed. The scope was then advanced to the stomach where  ultrasound of the left lobe of the liver, aorta, celiac axis, pancreas  body, tail, pancreatic duct, splenic artery, splenic vein, left adrenal  gland, left kidney and spleen were briefly visualized. The scope was  then advanced to the duodenum where ultrasound image of the pancreatic  head, uncinate process, common bile duct, pancreas duct, portal vein,  SMV were visualized. Gallbladder was also visualized. The patient  tolerated the procedure well without any difficulties. The patient was then repositioned in prone position. Then, a standard  therapeutic duodenoscope was inserted in the mouth and advanced under  direct visualization to the second portion of the duodenum. The patient  tolerated the procedure well without any difficulties. FINDINGS:    ESOPHAGUS:  The examined esophagus appeared normal.  Ultrasound images  of the mediastinum in the AP window were negative for lymphadenopathy. STOMACH:  Examined portion of the stomach appeared normal.  There were  retained contents in the stomach consistent with gastroparesis. Ultrasound images of the left lobe of the liver showed heterogeneous  appearance with multiple hyperechoic lesions. A 22-gauge Olympus EZ  Shot needle was used to sample this lesion and sent for cytology. There  was mild-to-moderate intrahepatic ductal dilation. Aorta and celiac  axis were negative for lymphadenopathy. Pancreas body and tail were  examined to be moderate to severely atrophic.   The left adrenal gland,  left kidney, and spleen were visualized to be normal.    DUODENUM: Examined portion of the duodenum appeared normal.  There was  a 2.2 x 1.7 cm hypoechoic mass in the pancreas head causing biliary  ductal and pancreas duct compression. A 22-gauge Olympus EZ Shot needle  was used to sample this lesion and sent for cytology. The gallbladder  was visualized to be moderately distended with sludge-like material.   Ampulla appeared normal.    After the EUS, therapeutic ERCP is being performed. A standard  duodenoscope was placed at the level of ampulla. Deep wire-guided  cannulation was successfully achieved using TRUEtome sphincterotome  loaded with 0.025 Revolution Jagwire. The common bile duct was  selectively cannulated and bile was aspirated to confirm position. Then, contrast was injected. Images were interpreted by me. The entire  common bile duct was opacified along with primary intrahepatic branches. There was a tight stricture in the distal duct with proximal dilation  measuring 8-10 mm. The stricture was identified at 4 cm from the  ampulla. At this time, a decision was made to proceed with  sphincterotomy. A 1-cm generous sphincterotomy was performed  successfully. Then, a 10 x 60 mm [employer] uncovered metal  stent was deployed across the stricture and confirmed by fluoroscopic  images. A large amount of dark bile was noted to be draining easily. At this time, the procedure was deemed to be successful and terminated. The scope was withdrawn after aspiration of air and liquid contents from  the upper GI tract. SUMMARY:  1.  A 2.2 x 1.7 cm pancreas head mass. 2.  Multiple liver lesions, concerning for metastatic disease. 3.  Distal CBD stricture. 4.  Moderate intra and extrahepatic biliary ductal dilation. 5.  Successful ERCP and placement of 10 mm x 60 mm uncovered self-expanding metal stent    RECOMMENDATIONS:  1. Discharge the patient to home when standard parameters are met. 2.  Check CA 19-9.  3.  Await cytology results.   4.  Follow up with Oncology, [facility] as soon as possible. 5.  We will prescribe a trial of Creon for the patient's diarrhea  presumably from enzyme insufficiency.     EBL: <5mL    Christofer Tatum MD    D: 07/07/2021 11:39:26       T: 07/07/2021 11:45:44     [email or web address]  Job#: [id number]     Doc#: [id number]    CC:  Daniela Miller, MD Miranda Johnson MD

## 2021-07-09 NOTE — TELEPHONE ENCOUNTER
Dr Radha Gray called wanting to speak with Dr Teressa Blake regarding the patient. Please call back.

## 2021-07-12 NOTE — TELEPHONE ENCOUNTER
Spoke to Dr Abilio Christie earlier. Liver and pancreas biopsies positive. Referred to Oncology. Spoke tp pt this evening. Apportionment with oncology on the 23 th. Dr Eduardo Santo. Some voiding sx- will add flomax  Some constipation from pain meds-  Using dulcolax softener. Discussed.

## 2021-07-12 NOTE — TELEPHONE ENCOUNTER
Spoke to Dr Zeina Peralta. Pancreatic mass and liver lesions + cancer. Referred to Oncology.   discussed

## 2021-07-14 NOTE — TELEPHONE ENCOUNTER
Spoke to the patient's wife. Discussed possible exposure with Covid as the visitors are not vaccinated. Recommend wearing masks if they do visit. She reports he has been feeling lightheaded and dizzy-billie today. Potentially that is caused by the tamsulosin so it may be should back off that and see what happens.

## 2021-07-21 NOTE — TELEPHONE ENCOUNTER
Called and spoke w/ pts wife - Pt is scheduled for a Surgical Port Placement [facility]) w/ Dr Norbert Robledo on 7/28/21 @ 12pm arrival [address] - NPO after midnight - [facility] completed pre-op phyiscal (see media) - Pt received [employer] COVID vaccination in March 2021 - Surgery instructions emailed to pt: [email or web address] - Pts wife understood and agreed to above noted

## 2021-07-23 NOTE — TELEPHONE ENCOUNTER
Pt wife called. The patient was recently dx with pancreatic cancer and she wants to know if it is safe for him to be around/close contact with people who have not had the covid vaccine. Please advise. abdomen soft, non-tender, and non-distended. Bowel sounds present.

## 2021-07-26 NOTE — ANESTHESIA PRE PROCEDURE
Department of Anesthesiology  Preprocedure Note       Name:  Roro Cisse   Age:  76 y.o.  :  1945                                          MRN:  [id number]         Date:  2021      Surgeon:  Zofia Benz MD    Procedure: ERCP WF W/ANES     HPI:  60-year-old male with history of hypertension, GERD, arthritis, recently developed progressively worsening abdominal pain. He underwent EGD in 2021, which was negative. He was subsequently found to have elevated LFTs. CT scan of the abdomen and pelvis last week showed abnormal liver lesions as well as a soft tissue mass in the pancreas body suspicious for pancreas adenocarcinoma. Multiple pulmonary nodules were also seen suggestive of pulmonary metastasis. He is found to be jaundiced with a bilirubin of 3. EUS and ERCP was performed 2021 for diagnostic and therapeutic purposes. Medications prior to admission:    oxyCODONE (ROXICODONE) 5 MG immediate release tablet Take 5 mg by mouth every 4 hours as needed for Pain. CREON 24794-054204 units CPEP delayed release capsule 3 times daily (with meals)   zolpidem (AMBIEN) 5 MG tablet Take 5 mg by mouth nightly as needed. LORazepam (ATIVAN) 0.5 MG tablet Take 0.5 mg by mouth nightly as needed for Anxiety.    tamsulosin (FLOMAX) 0.4 MG capsule Take 1 capsule by mouth daily For urine flow   sildenafil (VIAGRA) 100 MG tablet TAKE ONE TABLET BY MOUTH AS NEEDED FOR ERECTILE DYSFUNCTION **NO MORE THAN ONE TABLET IN 24 HOURS**   aspirin 81 MG EC tablet Take 81 mg by mouth daily   lisinopril-hydroCHLOROthiazide (PRINZIDE;ZESTORETIC) 10-12.5 MG  TAKE ONE TABLET BY MOUTH DAILY     Allergies:     Clarithromycin     Gabapentin     Pravastatin     Promethazine-Codeine     Vicodin [Hydrocodone-Acetaminophen]      Causes prostate swelling- needs to take terazosin if takes vicodin    Zegerid [Omeprazole]      Problem List:     Left TKR    HTN (hypertension)    GERD (gastroesophageal reflux disease)    Bilateral high frequency sensorineural hearing loss    Cervical radiculitis    Ulnar neuropathy of left upper extremity    Left wrist pain    Mass of left wrist    Thoracic or lumbosacral neuritis or radiculitis, unspecified    Syncope and collapse    Other cervical disc degeneration at C6-C7 level    Other cervical disc degeneration at C4-C5 level    Hyperlipidemia    Arthritis of right hand    Status post total right knee replacement    S/P total knee arthroplasty, right    Lumbosacral spondylosis without myelopathy     Past Medical History:     Arthritis     Cancer ([address] 2021    Pancreatic    GERD (gastroesophageal reflux disease)     resolved with surgery    Hiatal hernia     Hypertension     Retention of urine     with narcotics     Past Surgical History:     COLONOSCOPY  10/29/2013    ERCP N/A 7/7/2021    ERCP SPHINCTER/PAPILLOTOMY performed by Kiesha Renae MD at [address] ERCP  7/7/2021    ERCP STENT INSERTION performed by Kiesha Renae MD at [facility] Left 10/04/2017    excision left forearm mass    GASTRIC FUNDOPLICATION  feb 9477    HERNIA REPAIR  02/19/2015    ROBOTIC ASSISTED HIATAL HERNIA REPAIR WITH NISSEN    JOINT REPLACEMENT Left 01/31/13    left total knee replacement    JOINT REPLACEMENT Right 2019    right TKR    KNEE ARTHROSCOPY      left    OTHER SURGICAL HISTORY  07/07/2021    ERCP WF W/upper EUS W/Metal biliary stent replacement W/Anes.  TOTAL KNEE ARTHROPLASTY Right 10/22/2020    RIGHT TOTAL KNEE REPLACEMENT      CELAYA & NEPHEW performed by Robles Moon MD at [facility] ENDOSCOPY  2015    bx    UPPER GASTROINTESTINAL ENDOSCOPY N/A 7/7/2021    UPPER EUS W/ANES.  performed by Kiesha Renae MD at [facility]  7/7/2021    EGD BIOPSY performed by [place] Imelda Rachel MD at [facility]       Social History:     Smoking status: Former Smoker     Packs/day: 0.50     Years: 17.00     Pack years: 8.50     Quit date: 10/21/1976     Years since quittin.7    Smokeless tobacco: Never Used   Substance Use Topics    Alcohol use: No     Alcohol/week: 0.0 standard drinks     Vital Signs (Current):    BP: 139/64 Pulse: 57   Resp: 18 SpO2: 99   Temp: 97.2 °F (36.2 °C)   Height: 5' 6\" (1.676 m)  (21) Weight: 130 lb (59 kg)  (21)   BMI: 21           BP Readings from Last 3 Encounters:   21 (!) 120/41   21 (!) 105/55   21 136/66     NPO Status: >8 hrs                        BMI:   Wt Readings from Last 3 Encounters:   21 137 lb (62.1 kg)   21 141 lb (64 kg)   21 144 lb (65.3 kg)       CBC:    WBC 7.3 2021    HGB 12.3 2021    HCT 35.8 2021     2021     CMP:     2021    K 3.7 2021     2021    CO2 19 2021    BUN 14 2021    CREATININE 0.6 2021    GLUCOSE 108 2021    PROT 6.8 2021    CALCIUM 9.2 2021    BILITOT 6.7 2021    ALKPHOS 777 2021     2021     2021     Coags:    PROTIME 13.2 2021    INR 1.16 2021    APTT 33.3 10/07/2020     COVID-19 Screening (If Applicable):     COVID19 NOT DETECTED 10/16/2020     Anesthesia Evaluation  Patient summary reviewed and Nursing notes reviewed no history of anesthetic complications:   Airway: Mallampati: II  TM distance: >3 FB   Neck ROM: full  Mouth opening: > = 3 FB Dental:          Pulmonary: breath sounds clear to auscultation      (-) COPD, asthma, shortness of breath, sleep apnea, wheezes and not a current smoker                           Cardiovascular:  Exercise tolerance: good (>4 METS),   (+) hypertension:,     (-) CABG/stent,  angina,  DAVEY and murmur      Rhythm: regular  Rate: normal                    Neuro/Psych: (-) seizures, TIA and CVA            ROS comment: +Cervical DDD GI/Hepatic/Renal:   (+) hiatal hernia, GERD: well controlled, liver disease:,      (-) no renal disease      ROS comment: S/P Nissen Fundoplication with resolve of GERD sx.    +Biliary Obstruction. Endo/Other:    (+) malignancy/cancer. (-) diabetes mellitus, hypothyroidism                ROS comment: +Pancreatic AdenoCa Abdominal:             Vascular:     - DVT and PE. Other Findings:           Anesthesia Plan      general     ASA 3       Induction: intravenous. MIPS: Prophylactic antiemetics administered. Anesthetic plan and risks discussed with patient. Plan discussed with CRNA.             Nimesh Andrade MD

## 2021-07-26 NOTE — TELEPHONE ENCOUNTER
Pts wife called saying pt needs a stent placed and cancelled his surgery  Informed Rubi Sigala (Scheduling)  Informed Dr Ezequiel Greco ([facility])

## 2021-07-28 PROBLEM — K83.1 BILIARY OBSTRUCTION: Status: ACTIVE | Noted: 2021-01-01

## 2021-07-28 NOTE — PROGRESS NOTES
Brief Postoperative Note    Miquel Rios  YOB: 1945  [id number]    Pre-operative Diagnosis: Biliary obstruction    Post-operative Diagnosis: Same    Procedure: Percutaneous biliary drain placement    Anesthesia: General    Surgeons/Assistants: Keya Morton MD    Estimated Blood Loss: 15 cc    Complications: None    Specimens: Was Not Obtained    Findings:   1. Stricture in a second order right hepatic bile duct. 2. Partially obstructed common bile duct. 3. Blood tinged bile draining to external drain. 4. 8 Fr tube placed proximal to CBD stent. 5. Mild intrahepatic ductal dilatation. 6. No perihepatic hematoma seen on post procedure ultrasound. Full dictation to follow. Recommend trend H&H. Admit for observation. Case discussed with Dr. Nadya Holbrook.     Electronically signed by Keya Morton MD on 7/28/2021 at 5:12 PM

## 2021-07-28 NOTE — ANESTHESIA POSTPROCEDURE EVALUATION
Department of Anesthesiology  Postprocedure Note    Patient: Michael Paz  MRN: [id number]  YOB: 1945  Date of evaluation: 7/28/2021    Procedure Summary     Date: 07/28/21 Room / Location: [place] [address] / [facility]    Anesthesia Start: 1430 Anesthesia Stop: 7194    Procedure: [address] [facility] (N/A ) Diagnosis: (gallbladder drain)    Surgeons: Leticia Yuen MD Responsible Provider: Michael Smith MD    Anesthesia Type: General ASA Status: 3 - Emergent        Anesthesia Type: General    Angela Phase I: Angela Score: 9    Angela Phase II: Angela Score: 9    Last vitals: Reviewed and per EMR flowsheets.      Anesthesia Post Evaluation   Anesthetic Problems: no   Cardiovascular System Stable: yes  Respiratory Function: Airway Patent yes  ETT no  Ventilator no  Level of consciousness: awake, alert and oriented  Post-op pain: adequate analgesia  Hydration Adequate: yes  Nausea/Vomiting:no  Other Issues:     Gerber Trevino MD

## 2021-07-28 NOTE — PROGRESS NOTES
Pt arrived for image guided Gallbladder drain insertion RUQ . Procedure explained including the risk and benefits of the procedure. All questions answered. Pt verbalizes understanding of the procedure and states no more questions. Consent confirmed. Vital signs stable. Labs, allergies, medications, and code status reviewed. No contraindications noted.      Vital Signs  Vitals:    07/28/21 1338   BP: 134/74   Pulse: 62   Resp: 14   Temp: 98.1 °F (36.7 °C)   SpO2: 92%    (vital signs in table format)    Pre Angela Score  2 - Able to move 4 extremities voluntarily on command  2 - BP+/- 20mmHg of normal  2 - Fully awake  2 - Able to maintain oxygen saturation >92% on room air  2 - Able to breathe deeply and cough freely    Allergies  Clarithromycin, Gabapentin, Pravastatin, Promethazine-codeine, Vicodin [hydrocodone-acetaminophen], and Zegerid [omeprazole]     Labs  Lab Results   Component Value Date    INR 1.08 07/28/2021    PROTIME 12.2 07/28/2021     Lab Results   Component Value Date    CREATININE 0.6 (L) 07/07/2021    BUN 14 07/07/2021     (L) 07/07/2021    GFR >60 02/03/2013    K 3.7 07/07/2021     07/07/2021    CO2 19 (L) 07/07/2021     Lab Results   Component Value Date    WBC 7.3 07/28/2021    HGB 12.3 (L) 07/28/2021    HCT 36.3 (L) 07/28/2021    MCV 94.3 07/28/2021     07/28/2021

## 2021-07-28 NOTE — BRIEF OP NOTE
Brief Postoperative Note      Patient: Keyana Cochran  YOB: 1945  MRN: [id number]    Date of Procedure: 7/28/2021    Pre-Op Diagnosis: BILE DUCT STRICTURE    Post-Op Diagnosis: unable to retrieve the metal stent which appears to have migrated to cystic duct       Procedure(s):  ERCP STONE REMOVAL    Surgeon(s):  Yovanny Villalobos MD    Assistant:  * No surgical staff found *    Anesthesia: Monitor Anesthesia Care    Estimated Blood Loss (mL): Minimal    Complications: None    Specimens:   * No specimens in log *    Implants:  * No implants in log *      Drains: * No LDAs found *    Findings: unable to retrieve the metal stent which appears to have migrated to cystic duct    Electronically signed by Yovanny Villalobos MD on 7/28/2021 at 11:56 AM

## 2021-07-28 NOTE — PROGRESS NOTES
PT IS TO GO TO RADIOLOGY FOR A PERCUTANEOUS GALLBLADDER DRAIN PLACEMENT; WIFE IS AT BEDSIDE; PT HAS BEEN RESTING QUIETLY; STATES ABDOMINAL PAIN IS 3/10 AND TOLERABLE

## 2021-07-28 NOTE — H&P
History and Physical / Pre-Sedation Assessment    Patient:  Sathish Almanza   :   1945     Intended Procedure:  ERCP    HPI: 76year old male with pancreas cancer s/p bile duct stent presents with biliary obstruction. Past Medical History:   Diagnosis Date    Arthritis     Cancer [facility])     Pancreatic    GERD (gastroesophageal reflux disease)     resolved with surgery    Hiatal hernia     Hypertension     Retention of urine     with narcotics     Past Surgical History:   Procedure Laterality Date    COLONOSCOPY  10/29/2013    ERCP N/A 2021    ERCP SPHINCTER/PAPILLOTOMY performed by Perez Blackburn MD at [address] [facility] ERCP  2021    ERCP STENT INSERTION performed by Perez Blackburn MD at [address] [facility] Left 10/04/2017    excision left forearm mass    GASTRIC FUNDOPLICATION  9092    HERNIA REPAIR  2015    ROBOTIC ASSISTED HIATAL HERNIA REPAIR WITH NISSEN    JOINT REPLACEMENT Left 13    left total knee replacement    JOINT REPLACEMENT Right     right TKR    KNEE ARTHROSCOPY      left    OTHER SURGICAL HISTORY  2021    ERCP WF W/upper EUS W/Metal biliary stent replacement W/Anes.  TOTAL KNEE ARTHROPLASTY Right 10/22/2020    RIGHT TOTAL KNEE REPLACEMENT      CELAYA & NEPHEW performed by Fabian Lagos MD at [address] [facility] ENDOSCOPY  2015    bx    UPPER GASTROINTESTINAL ENDOSCOPY N/A 2021    UPPER EUS W/ANES. performed by Perez Blackburn MD at [facility] 5  2021    EGD BIOPSY performed by Perez Blackburn MD at [address] [place] [address],[address] [place] [address]         Medications reviewed  Prior to Admission medications    Medication Sig Start Date End Date Taking? Authorizing Provider   oxyCODONE (ROXICODONE) 5 MG immediate release tablet Take 5 mg by mouth every 4 hours as needed for Pain. Historical Provider, MD   CREON [id number] units CPEP delayed release capsule 3 times daily (with meals) 7/7/21   Historical Provider, MD   zolpidem (AMBIEN) 5 MG tablet Take 5 mg by mouth nightly as needed. 7/19/21   Historical Provider, MD   LORazepam (ATIVAN) 0.5 MG tablet Take 0.5 mg by mouth nightly as needed for Anxiety. Historical Provider, MD   tamsulosin (FLOMAX) 0.4 MG capsule Take 1 capsule by mouth daily For urine flow 7/12/21   Marie Caal DO   sildenafil (VIAGRA) 100 MG tablet TAKE ONE TABLET BY MOUTH AS NEEDED FOR ERECTILE DYSFUNCTION **NO MORE THAN ONE TABLET IN 24 HOURS** 6/14/21   Marie Caal DO   aspirin 81 MG EC tablet Take 81 mg by mouth daily    Historical Provider, MD   lisinopril-hydroCHLOROthiazide (PRINZIDE;ZESTORETIC) 10-12.5 MG per tablet TAKE ONE TABLET BY MOUTH DAILY 4/12/21   Marie Caal DO        Allergies: Allergies   Allergen Reactions    Clarithromycin     Gabapentin     Pravastatin     Promethazine-Codeine     Vicodin [Hydrocodone-Acetaminophen]      Causes prostate swelling- needs to take terazosin if takes vicodin    Zegerid [Omeprazole]        Nurses notes reviewed and agreed. Physical Exam:  Vital Signs: /64   Pulse 57   Temp 97.2 °F (36.2 °C) (Temporal)   Resp 18   Ht 5' 6\" (1.676 m)   Wt 130 lb (59 kg)   SpO2 99%   BMI 20.98 kg/m²    Airway: Mallampati: II (soft palate, uvula, fauces visible)  Pulmonary:Normal  Cardiac:Normal  Abdomen:Normal    Pre-Procedure Assessment / Plan:  ASA: Class 3 - A patient with severe systemic disease that limits activity but is not incapacitating  Level of Sedation Plan: Moderate sedation  Post Procedure plan: Return to same level of care    I assessed the patient and find that the patient is in satisfactory condition to proceed with the planned procedure and sedation plan. I have explained the risk, benefits, and alternatives to the procedure; the patient understands and agrees to proceed.        Justin Galindo Alexis Pham MD  7/28/2021

## 2021-07-28 NOTE — PRE SEDATION
Sedation Pre-Procedure Note    Patient Name: Yeimy Saba   YOB: 1945  Room/Bed: ENDO/NONE  Medical Record Number: [id number]  Date: 7/28/2021   Time: 2:35 PM       Indication: Obstructed cbd with biliary dilatation. Hyperbilirubinemia, which precludes treatment of pancreatic neoplasm. External biliary drain is planned. Consent: Consent was obtained from the patient's wife. I have discussed with the patient and/or the patient representative the indication, alternatives, and the possible risks and/or complications of the planned procedure and the anesthesia methods. The patient and/or patient representative appear to understand and agree to proceed. Vital Signs:   Vitals:    07/28/21 1338   BP: 134/74   Pulse: 62   Resp: 14   Temp: 98.1 °F (36.7 °C)   SpO2: 92%       Past Medical History:   has a past medical history of Arthritis, Cancer ([facility] [address] GERD (gastroesophageal reflux disease), Hiatal hernia, Hypertension, and Retention of urine. Past Surgical History:   has a past surgical history that includes Knee arthroscopy; Vasectomy; Colonoscopy (10/29/2013); Upper gastrointestinal endoscopy; Upper gastrointestinal endoscopy (2015); hernia repair (02/19/2015); Gastric fundoplication (feb 4954); Forearm surgery (Left, 10/04/2017); Total knee arthroplasty (Right, 10/22/2020); other surgical history (07/07/2021); ERCP (N/A, 7/7/2021); Upper gastrointestinal endoscopy (N/A, 7/7/2021); ERCP (7/7/2021); Upper gastrointestinal endoscopy (7/7/2021); joint replacement (Left, 01/31/13); and joint replacement (Right, 2019). Medications:   Scheduled Meds:   Continuous Infusions:    lactated ringers       PRN Meds: meperidine, fentanNYL, HYDROmorphone, HYDROmorphone, HYDROmorphone, oxyCODONE-acetaminophen **OR** oxyCODONE-acetaminophen, ondansetron, promethazine, hydrALAZINE  Home Meds:   Prior to Admission medications    Medication Sig Start Date End Date Taking?  Authorizing Provider   oxyCODONE (ROXICODONE) 5 MG immediate release tablet Take 5 mg by mouth every 4 hours as needed for Pain. Historical Provider, MD   CREON [id number] units CPEP delayed release capsule 3 times daily (with meals) 7/7/21   Historical Provider, MD   zolpidem (AMBIEN) 5 MG tablet Take 5 mg by mouth nightly as needed. 7/19/21   Historical Provider, MD   LORazepam (ATIVAN) 0.5 MG tablet Take 0.5 mg by mouth nightly as needed for Anxiety. Historical Provider, MD   tamsulosin (FLOMAX) 0.4 MG capsule Take 1 capsule by mouth daily For urine flow 7/12/21   Jimy Fruits, DO   sildenafil (VIAGRA) 100 MG tablet TAKE ONE TABLET BY MOUTH AS NEEDED FOR ERECTILE DYSFUNCTION **NO MORE THAN ONE TABLET IN 24 HOURS** 6/14/21   Jimy Fruits, DO   aspirin 81 MG EC tablet Take 81 mg by mouth daily    Historical Provider, MD   lisinopril-hydroCHLOROthiazide (PRINZIDE;ZESTORETIC) 10-12.5 MG per tablet TAKE ONE TABLET BY MOUTH DAILY 4/12/21   Jimy Alatorre, DO       Pre-Sedation Documentation and Exam:   I have reviewed the patient's history and review of systems. Case will be done with general anesthesia. Please see separate documentation.     Patient is an appropriate candidate for plan of sedation: yes    Electronically signed by Reji Hunter MD on 7/28/2021 at 2:35 PM

## 2021-07-28 NOTE — ANESTHESIA PRE PROCEDURE
Department of Anesthesiology  Preprocedure Note       Name:  Ronit Rice   Age:  76 y.o.  :  1945                                          MRN:  [id number]         Date:  2021      Surgeon:  Millie Amado MD    Procedure:  PERCUTANEOUS GALLBLADDER DRAIN PLACEMENT           HPI:  80-year-old male with history of hypertension, GERD, arthritis, recently developed progressively worsening abdominal pain. He underwent EGD in 2021, which was negative. He was subsequently found to have elevated LFTs. CT scan of the abdomen and pelvis last week showed abnormal liver lesions as well as a soft tissue mass in the pancreas body suspicious for pancreas adenocarcinoma. Multiple pulmonary nodules were also seen suggestive of pulmonary metastasis. He is found to be jaundiced with a bilirubin of 3. EUS and ERCP was performed 2021 for diagnostic and therapeutic purposes. Medications prior to admission:    oxyCODONE (ROXICODONE) 5 MG immediate release tablet Take 5 mg by mouth every 4 hours as needed for Pain. CREON [id number] units CPEP delayed release capsule 3 times daily (with meals)   zolpidem (AMBIEN) 5 MG tablet Take 5 mg by mouth nightly as needed. LORazepam (ATIVAN) 0.5 MG tablet Take 0.5 mg by mouth nightly as needed for Anxiety.    tamsulosin (FLOMAX) 0.4 MG capsule Take 1 capsule by mouth daily For urine flow   sildenafil (VIAGRA) 100 MG tablet TAKE ONE TABLET BY MOUTH AS NEEDED FOR ERECTILE DYSFUNCTION **NO MORE THAN ONE TABLET IN 24 HOURS**   aspirin 81 MG EC tablet Take 81 mg by mouth daily   lisinopril-hydroCHLOROthiazide (PRINZIDE;ZESTORETIC) 10-12.5 MG  TAKE ONE TABLET BY MOUTH DAILY      Allergies:     Clarithromycin      Gabapentin      Pravastatin      Promethazine-Codeine      Vicodin [Hydrocodone-Acetaminophen]         Causes prostate swelling- needs to take terazosin if takes vicodin    Zegerid [Omeprazole]        Problem List:     Left TKR    HTN (hypertension)    GERD (gastroesophageal reflux disease)    Bilateral high frequency sensorineural hearing loss    Cervical radiculitis    Ulnar neuropathy of left upper extremity    Left wrist pain    Mass of left wrist    Thoracic or lumbosacral neuritis or radiculitis, unspecified    Syncope and collapse    Other cervical disc degeneration at C6-C7 level    Other cervical disc degeneration at C4-C5 level    Hyperlipidemia    Arthritis of right hand    Status post total right knee replacement    S/P total knee arthroplasty, right    Lumbosacral spondylosis without myelopathy      Past Medical History:     Arthritis      Cancer [address] [address] 2021     Pancreatic    GERD (gastroesophageal reflux disease)       resolved with surgery    Hiatal hernia      Hypertension      Retention of urine       with narcotics      Past Surgical History:     COLONOSCOPY   10/29/2013    ERCP N/A 7/7/2021     ERCP SPHINCTER/PAPILLOTOMY performed by Marco Antonio Powers MD at [facility] ERCP   7/7/2021     ERCP STENT INSERTION performed by Marco Antonio Powers MD at [facility] Left 10/04/2017     excision left forearm mass    GASTRIC FUNDOPLICATION   feb 9126    HERNIA REPAIR   02/19/2015     ROBOTIC ASSISTED HIATAL HERNIA REPAIR WITH NISSEN    JOINT REPLACEMENT Left 01/31/13     left total knee replacement    JOINT REPLACEMENT Right 2019     right TKR    KNEE ARTHROSCOPY         left    OTHER SURGICAL HISTORY   07/07/2021     ERCP WF W/upper EUS W/Metal biliary stent replacement W/Anes.  TOTAL KNEE ARTHROPLASTY Right 10/22/2020     RIGHT TOTAL KNEE REPLACEMENT      CELAYA & NEPHEW performed by Ash Roberts MD at [place] [address] ENDOSCOPY   2015     bx    UPPER GASTROINTESTINAL ENDOSCOPY N/A 7/7/2021     UPPER EUS W/ANES.  performed by Marco Antonio Powers MD at [facility] ENDOSCOPY   2021     EGD BIOPSY performed by Nilson Last MD at [address] [place] [address],[address]          Social History:     Smoking status: Former Smoker       Packs/day: 0.50       Years: 17.00       Pack years: 8.50       Quit date: 10/21/1976       Years since quittin.7    Smokeless tobacco: Never Used   Substance Use Topics    Alcohol use: No       Alcohol/week: 0.0 standard drinks      Vital Signs (Current):    BP: 139/64 Pulse: 57   Resp: 18 SpO2: 99   Temp: 97.2 °F (36.2 °C)   Height: 5' 6\" (1.676 m)  (21) Weight: 130 lb (59 kg)  (21)   BMI: 21                  BP Readings from Last 3 Encounters:   21 (!) 120/41   21 (!) 105/55   21 136/66            CBC:   2021 13:27   WBC 7.3   Hemoglobin Quant 12.3 (L)   Hematocrit 36.3 (L)   Platelet Count 373     CMP:     2021    K 3.7 2021     2021    CO2 19 2021    BUN 14 2021    CREATININE 0.6 2021    GLUCOSE 108 2021    PROT 6.8 2021    CALCIUM 9.2 2021    BILITOT 6.7 2021    ALKPHOS 777 2021     2021     2021     Coags:    2021 13:27   Prothrombin Time 12.2   INR 1.08     COVID-19 Screening (If Applicable):     COVID19 NOT DETECTED 10/16/2020     Anesthesia Evaluation  Patient summary reviewed and Nursing notes reviewed  Airway: Mallampati: II  TM distance: >3 FB   Neck ROM: full  Mouth opening: > = 3 FB Dental:          Pulmonary: breath sounds clear to auscultation      (-) COPD, asthma, recent URI, sleep apnea, wheezes and not a current smoker                           Cardiovascular:  Exercise tolerance: good (>4 METS),   (+) hypertension:,     (-) CABG/stent,  angina,  DAVEY and murmur      Rhythm: regular  Rate: normal                    Neuro/Psych:   (+) neuromuscular disease:,    (-) seizures, TIA and CVA            ROS comment: Cervical DDD GI/Hepatic/Renal:   (+) hiatal hernia, GERD:,

## 2021-07-28 NOTE — PLAN OF CARE
69 yo with pancreatic cancer - CBD obstruction and hyperbilirubinemia. CBD stent in place with persistent elevated bili. Underwent ERCP today, stent unable to be removed. Transferred to IR for external biliary drain placement  He f/w [facility] for cancer care, has not started cancer treatment yet. Of note, was supposed to have port placed today, but procedure cancelled for ERCP. Admit to med surg for observation after procedure.   IR c/s for follow up on floor

## 2021-07-28 NOTE — PROGRESS NOTES
Patient brought to 2W room 203 by surgery nurse report given to [facility], RN care handed off

## 2021-07-28 NOTE — OP NOTE
[address]                 [address] [address]                                OPERATIVE REPORT    PATIENT NAME: Vita Ontiveros                    :        1945  MED REC NO:   [id number]                          ROOM:  ACCOUNT NO:   [de-identified]                           ADMIT DATE: 2021  PROVIDER:     Jessica Pisano MD    DATE OF PROCEDURE:  2021    PREPROCEDURE DIAGNOSES:  1. Metastatic pancreas cancer. 2.  Biliary obstruction. 3.  Metal stent in place. POSTPROCEDURE DIAGNOSES:  1. Metallic biliary stent in place. 2.  Suspect biliary stent migrated into the cystic duct. 3.  Inability to retrieve the metallic stent. PROCEDURE PERFORMED:  ERCP. SURGEON:  Jessica Pisano MD    MEDICATIONS:  MAC per Anesthesia. PROCEDURE INDICATIONS:  This is a 60-year-old male with history of  arthritis, hypertension, GERD, and recently diagnosed metastatic  pancreas cancer to the liver, status post ERCP with metal stent, now  presents with persistent biliary obstruction and elevated LFTs. He had  ERCP with stent placement two weeks ago. However, his LFTs remained  elevated. Follow up CT scan showed intrahepatic ductal dilation. The  patient was brought back today in attempts to extract the existing stent  and place a longer stent into the biliary system. PROCEDURE DETAILS:  Informed consent obtained after discussing risks,  benefits, and alternatives. Full history and physical was performed. The patient was classified as ASA class III. Medications were given by  Anesthesia. Cardiopulmonary status was continuously monitored  throughout the procedure. The patient underwent endotracheal intubation  for airway protection. The patient was then placed into prone position.   Once adequately sedated, a standard therapeutic duodenoscope was  inserted in the mouth and advanced under direct visualization to the  second portion of the duodenum. The entire mucosa of the esophagus,  stomach, and duodenum was examined carefully. The patient tolerated the  procedure well without any difficulties. FINDINGS:  ESOPHAGUS:  Examined esophagus appeared normal on limited views. STOMACH:  Limited view of the stomach appeared normal.  DUODENUM:  The metallic stent was identified emanating from the papilla. Biliary balloon extraction catheter was inserted in the stent and  contrast was injected. Images were interpreted by me. The distal bile  duct opacified with contrast and subsequently contrast opacified the  cystic duct and the gallbladder. The proximal common duct and  intrahepatics were not opacified. This is suggestive of biliary stent  migration into the cystic duct. Attempts were made to retrieve the  stent with the right tooth forceps as well as balloon extraction method,  neither of which were successful. After several attempts, the procedure  was terminated, the scope was withdrawn, and the patient was sent to  Recovery. The patient tolerated the procedure well without any  difficulties. SUMMARY:  1.  Metal stent emanating from the papilla. 2.  The metal stent appears to have migrated into the cystic duct. 3.  Unsuccessful attempts in retrieval of the metal stent. RECOMMENDATIONS:  1. Discharge the patient to home when standard parameters are met. 2.  Discussed with IR for consideration of percutaneous drainage of the  biliary system with PTC. 3.  The patient is scheduled for PTC later today. 4.  Proceed with port placement and chemoradiation therapy as planned. 5.  The patient will eventually need internalization of the PTC drainage  into an intraductal biliary stent.     EBL: <5mL    Julieth Patel MD    D: 07/28/2021 12:54:04       T: 07/28/2021 13:44:12     [email or web address]  Job#: [id number]     Doc#: [id number]    CC:  Oracio Sanchez MD

## 2021-07-29 PROBLEM — C25.9 PANCREAS CANCER (HCC): Status: ACTIVE | Noted: 2021-01-01

## 2021-07-29 NOTE — PLAN OF CARE
Problem: Pain:  Goal: Pain level will decrease  Description: Pain level will decrease  7/29/2021 1055 by Clinton Nguyen RN  Outcome: Ongoing  7/29/2021 0309 by Eloy Bryant RN  Outcome: Ongoing  Goal: Control of acute pain  Description: Control of acute pain  7/29/2021 1055 by Clinton Nguyen RN  Outcome: Ongoing  7/29/2021 0309 by Eloy Bryant RN  Outcome: Ongoing  Goal: Control of chronic pain  Description: Control of chronic pain  7/29/2021 1055 by Clinton Nguyen RN  Outcome: Ongoing  7/29/2021 0309 by Eloy Bryant RN  Outcome: Ongoing     Problem: Falls - Risk of:  Goal: Will remain free from falls  Description: Will remain free from falls  7/29/2021 1055 by Clinton Nguyen RN  Outcome: Ongoing  7/29/2021 0309 by Eloy Bryant RN  Outcome: Ongoing  Goal: Absence of physical injury  Description: Absence of physical injury  7/29/2021 1055 by Clinton Nguyen RN  Outcome: Ongoing  7/29/2021 0309 by Eloy Bryant RN  Outcome: Ongoing

## 2021-07-29 NOTE — ACP (ADVANCE CARE PLANNING)
Advance Care Planning     General Advance Care Planning (ACP) Conversation    Date of Conversation: 7/28/2021  Conducted with: Patient with Decision Making Capacity    Healthcare Decision Maker:      Primary Decision Maker: Melissa Meeks Spouse - [phone]    Click here to complete [address] including selection of the Healthcare Decision Maker Relationship (ie \"Primary\")  Today we documented Decision Maker(s) consistent with Legal Next of Kin hierarchy. Content/Action Overview:   Has ACP document(s) on file - reflects the patient's care preferences  Reviewed DNR/DNI and patient elects Full Code (Attempt Resuscitation)    Length of Voluntary ACP Conversation in minutes:  <16 minutes (Non-Billable)    Leta Nugent RN

## 2021-07-29 NOTE — TELEPHONE ENCOUNTER
Pts wife called to r/s 's Port Placement w/ Dr Jb Brown. She said he had his stent placed today and is now ready to get his Port put in. Scheduled pt for a Surgical Port Placement [facility]) w/ Dr Jb Brown on 8/11/21 @ 8:15am arrival 6:15am [facility] - NPO after midnight - [facility] completed pre-op physical (see media) - Pt had [employer] COVID vaccine in March - Surgery instructions emailed to: [email or web address] (see media) - Wife understood and agreed to above noted

## 2021-07-29 NOTE — PROGRESS NOTES
4 Eyes Skin Assessment     The patient is being assess for   Admission    I agree that 2 RN's have performed a thorough Head to Toe Skin Assessment on the patient. ALL assessment sites listed below have been assessed. Areas assessed for pressure by both nurses:   [x]   Head, Face, and Ears   [x]   Shoulders, Back, and Chest, Abdomen  [x]   Arms, Elbows, and Hands   [x]   Coccyx, Sacrum, and Ischium  [x]   Legs, Feet, and Heels        Sm incision w/ sutures for biliary drain in RUQ abdomen. Skin Assessed Under all Medical Devices by both nurses:  N/A    All Mepilex Borders were peeled back and area peeked at by both nurses:  No: N/A             **SHARE this note so that the co-signing nurse is able to place an eSignature**    Co-signer eSignature: Electronically signed by Ishan Loco RN on 7/29/21 at 7:29 AM EDT    Does the Patient have Skin Breakdown related to pressure?   No          Gianni Prevention initiated:  No   Wound Care Orders initiated:  No      [employer] nurse consulted for Pressure Injury (Stage 3,4, Unstageable, DTI, NWPT, Complex wounds)and New or Established Ostomies:  No      Primary Nurse eSignature: Electronically signed by Angeline Cornell RN on 7/29/21 at 5:22 AM EDT

## 2021-07-29 NOTE — DISCHARGE SUMMARY
Name:  Karissa Hickman  Room:  0203/0203-01  MRN:    [id number]    Discharge Summary      This discharge summary is in conjunction with a complete physical exam done on the day of discharge. Discharging Physician: Jossie Larios MD       Admit: 7/28/2021  Discharge:   7/29/2021     Diagnoses this Admission    Active Problems:    HTN (hypertension)    GERD (gastroesophageal reflux disease)    Hyperlipidemia    Biliary obstruction    Pancreas cancer ([address]  Resolved Problems:    * No resolved hospital problems. *      Procedures (Please Review Full Report for Details)    PTC by IR    Consults    none    HPI:      76 y.o. male PMH HTN, GERD, arthritis, had worsening abdominal pain EGD May 2021 negative. found to have elevated LFT. CT abdomen/pelvis 7/1/2021 found infiltrative mass in body of pancreas 2.3 x 4.3 cm consistent with pancreatic cancer. Multiple nodular densities at lung bases, liver nodules suspicious for liver and lung mets, biliary ductal dilatation. underwent EUS FNA and ERCP by Dr. Anita Helton with a bile duct stent on 7/7/21. who presented to [facility] 7/28/2021 still has obstructive biliary labs TB 3.9, and a follow-up CT scan showed intrahepatic ductal dilatation. The patient was brought back today underwent ERCP hopefully in a plan to remove the existing stent and place a longer stent into the biliary system however Dr. Howard Ours was unable to retrieve the metal biliary stent which has migrated to the cystic duct. IR placed percutaneous cholecystostomy or gallbladder tube    Physical Exam at Discharge:  BP (!) 146/79   Pulse 65   Temp 98.1 °F (36.7 °C) (Oral)   Resp 16   Ht 5' 6\" (1.676 m)   Wt 135 lb 8 oz (61.5 kg)   SpO2 97%   BMI 21.87 kg/m²     Physical Exam  Constitutional:       Appearance: He is well-developed. HENT:      Head: Normocephalic and atraumatic. Eyes:      General: No scleral icterus.      Conjunctiva/sclera: Conjunctivae normal.      Pupils: Pupils are equal, round, and reactive to light. Neck:      Thyroid: No thyromegaly. Cardiovascular:      Rate and Rhythm: Normal rate and regular rhythm. Heart sounds: No murmur heard. Pulmonary:      Effort: Pulmonary effort is normal.      Breath sounds: Normal breath sounds. No wheezing. Musculoskeletal:      Cervical back: Normal range of motion and neck supple. Lymphadenopathy:      Cervical: No cervical adenopathy. Skin:     Coloration: Skin is jaundiced. Neurological:      Mental Status: He is alert. Hospital Course    Pancreatic cancer with lung metastasis  Biliary obstruction  Migration of biliary stent into cystic duct  S/p percutaneous drainage gallbladder     --Continue Creon  --Post procedures clear liquid diet once as tolerated  --Recheck in LFT shows improvement  --Admitted OBS status  -- spoke of proceeding with port placement and chemo radiation therapy query patient when this is scheduled for, is this to be done outpatient  Notes will eventually need internalization of the PTC drainage into an intraductal biliary stent     Okay to discharge home     HTN  --Continue lisinopril/HCTZ 10/12.5 mg p.o. daily     Anemia of chronic disease/cancer  --Hgb stable 12.2     Insomnia  --States his sleep med was recently increased to 2 tabs.   Ambien 5 mg at bedtime as needed(computer flags for high dose if attempt to order 10 mg)  --Add melatonin as needed  --Has Ativan 0.5 mg at bedtime as needed     Right upper quadrant abdominal pain  -Slowly improving      CBC:   Recent Labs     07/28/21  1327 07/28/21 2025 07/29/21  0537   WBC 7.3  --  7.9   HGB 12.3* 12.2* 11.4*   HCT 36.3* 36.0* 33.1*   MCV 94.3  --  93.7     --  128*     BMP:   Recent Labs     07/28/21 2024 07/29/21  0537   * 131*   K 4.0 3.9   CL 98* 99   CO2 22 23   BUN 27* 25*   CREATININE 1.0 0.9     LIVER PROFILE:   Recent Labs     07/28/21 2024 07/29/21  0537   * 117*   ALT 48* 41* LIPASE  --  44.0   BILITOT 3.9* 3.4*   ALKPHOS 689* 609*     PT/INR:   Recent Labs     07/28/21  1327   PROTIME 12.2   INR 1.08         No orders to display      Discharge Medications     Medication List      CONTINUE taking these medications    aspirin 81 MG EC tablet     Creon 35209-222723 units Cpep delayed release capsule  Generic drug: lipase-protease-amylase     DULCOLAX PO     lisinopril-hydroCHLOROthiazide 10-12.5 MG per tablet  Commonly known as: PRINZIDE;ZESTORETIC  TAKE ONE TABLET BY MOUTH DAILY     oxyCODONE 5 MG immediate release tablet  Commonly known as: ROXICODONE     sildenafil 100 MG tablet  Commonly known as: VIAGRA  TAKE ONE TABLET BY MOUTH AS NEEDED FOR ERECTILE DYSFUNCTION **NO MORE THAN ONE TABLET IN 24 HOURS**     tamsulosin 0.4 MG capsule  Commonly known as: FLOMAX  Take 1 capsule by mouth daily For urine flow     zolpidem 5 MG tablet  Commonly known as: AMBIEN        STOP taking these medications    LORazepam 0.5 MG tablet  Commonly known as: ATIVAN              Discharge Condition/Location: Stable    Follow Up: Follow up with Oncology.       Yolis Clakr MD 7/29/2021 2:16 PM

## 2021-07-29 NOTE — PROGRESS NOTES
PT TRANSFERRED TO [facility] VIA CART BY RN IN STABLE CONDITION; WIFE PRESENT; STATES  PAIN IS 4/10 AND TOLERABLE

## 2021-07-29 NOTE — CARE COORDINATION
Case Management Assessment  Initial Evaluation      Patient Name: Bobbi Marx  YOB: 1945  Diagnosis: Biliary obstruction [K83.1]  Date / Time: 7/28/2021  7:49 PM    Admission status/Date: OBS  Chart Reviewed: Yes      Patient Interviewed: Yes   Family Interviewed:  No      Hospitalization in the last 30 days:  No      Health Care Decision Maker :   Primary Decision Maker: Tarsha Olson - Spouse - [phone]    (CM - must 1st enter selection under Navigator - emergency contact- [place] Relationship and pick relationship)   Who do you trust or have selected to make healthcare decisions for you      Met with: patient  Interview conducted  (bedside/phone): bedside    Current PCP: Edgardo Stewart Medicare  Precert required for SNF : YES         3 night stay required - NA    ADLS  Support Systems/Care Needs: Spouse/Significant Other  Transportation: self    Meal Preparation: self    Housing  Living Arrangements: Lives w/sp, IPTA  Steps: NA  Intent for return to present living arrangements: Yes   Identified Issues: NA    Home Care Information  Active with [address] : No Agency:(Services)  Type of Home Care Services: None  Passport/Waiver : No  :                      Phone Number:    Passport/Waiver Services: NA          Durable Medical Equiptment   DME Provider: JOSIAH  Equipment: NA  Walker___Cane___RTS___ BSC___Shower Chair___Hospital Bed___W/C____Other________  02 at ____Liter(s)---wears(frequency)_______ HHN ___ CPAP___ BiPap___   N/A____      Home O2 Use :  No        Community Service Affiliation  Dialysis:  No    · Agency:  · Location:  · Dialysis Schedule:  · Phone:   · Fax: Other Community Services: (ex:PT/OT,Mental Health,Wound Clinic, Cardio/Pul [address])    DISCHARGE PLAN: Explained Case Management role/services. Chart reviewed. Met with pt at bedside and explained the role of the CM. Plans to return home. IPTA. Denies any new HHC or DME needs. No DC barriers identified. CM will follow from periphery and reassess.

## 2021-07-29 NOTE — H&P
Hospital Medicine History & Physical      PCP: Maria G HarDO wilman    Date of Admission: 7/28/2021    Date of Service: Pt seen/examined on 7/28/21 and Admitted to OBS with expected LOS less than two midnights     Chief Complaint: Abdominal pain      History Of Present Illness:     76 y.o. male PMH HTN, GERD, arthritis, had worsening abdominal pain EGD May 2021 negative. found to have elevated LFT. CT abdomen/pelvis 7/1/2021 found infiltrative mass in body of pancreas 2.3 x 4.3 cm consistent with pancreatic cancer. Multiple nodular densities at lung bases, liver nodules suspicious for liver and lung mets, biliary ductal dilatation. underwent EUS FNA and ERCP by Dr. Ladonna Barrett with a bile duct stent on 7/7/21. who presented to [facility] 7/28/2021 still has obstructive biliary labs TB 3.9, and a follow-up CT scan showed intrahepatic ductal dilatation. The patient was brought back today underwent ERCP hopefully in a plan to remove the existing stent and place a longer stent into the biliary system however Dr. Ladonna Barrett was unable to retrieve the metal biliary stent which has migrated to the cystic duct.   IR placed percutaneous cholecystostomy or gallbladder tube    Past Medical History:          Diagnosis Date    Arthritis     Cancer ([address] 2021    Pancreatic    GERD (gastroesophageal reflux disease)     resolved with surgery    Hiatal hernia     Hypertension     Retention of urine     with narcotics       Past Surgical History:          Procedure Laterality Date    COLONOSCOPY  10/29/2013    ERCP N/A 7/7/2021    ERCP SPHINCTER/PAPILLOTOMY performed by Aisha Weaver MD at [address] ERCP  7/7/2021    ERCP STENT INSERTION performed by Aisha Weaver MD at [facility] ERCP N/A 07/28/2021    ERCP STONE REMOVAL    FOREARM SURGERY Left 10/04/2017    excision left forearm mass    GASTRIC FUNDOPLICATION  feb 1877    HERNIA REPAIR  02/19/2015    ROBOTIC ASSISTED HIATAL HERNIA REPAIR WITH [facility]ISSEN    JOINT REPLACEMENT Left 01/31/13    left total knee replacement    JOINT REPLACEMENT Right 2019    right TKR    KNEE ARTHROSCOPY      left    OTHER SURGICAL HISTORY  07/07/2021    ERCP WF W/upper EUS W/Metal biliary stent replacement W/Anes.  OTHER SURGICAL HISTORY N/A 07/28/2021    PERCUTANEOUS GALLBLADDER DRAIN PLACEMENT    TOTAL KNEE ARTHROPLASTY Right 10/22/2020    RIGHT TOTAL KNEE REPLACEMENT      CELAYA & NEPHEW performed by Jaida Loza MD at [facility] ENDOSCOPY  2015    bx    UPPER GASTROINTESTINAL ENDOSCOPY N/A 7/7/2021    UPPER EUS W/ANES. performed by Milagros Parsons MD at [facility]  7/7/2021    EGD BIOPSY performed by Milagros Parsons MD at [facility]         Medications Prior to Admission:      Prior to Admission medications    Medication Sig Start Date End Date Taking? Authorizing Provider   Magnesium Hydroxide (DULCOLAX PO) Take by mouth   Yes Historical Provider, MD   oxyCODONE (ROXICODONE) 5 MG immediate release tablet Take 5 mg by mouth every 4 hours as needed for Pain. Yes Historical Provider, MD   CREON [id number] units CPEP delayed release capsule 3 times daily (with meals) 7/7/21  Yes Historical Provider, MD   zolpidem (AMBIEN) 5 MG tablet Take 5 mg by mouth nightly as needed.  7/19/21  Yes Historical Provider, MD   tamsulosin (FLOMAX) 0.4 MG capsule Take 1 capsule by mouth daily For urine flow 7/12/21  Yes Cherelle Prim, DO   sildenafil (VIAGRA) 100 MG tablet TAKE ONE TABLET BY MOUTH AS NEEDED FOR ERECTILE DYSFUNCTION **NO MORE THAN ONE TABLET IN 24 HOURS** 6/14/21  Yes Cherelle Prim, DO   aspirin 81 MG EC tablet Take 81 mg by mouth daily   Yes Historical Provider, MD   lisinopril-hydroCHLOROthiazide (PRINZIDE;ZESTORETIC) 10-12.5 MG per tablet TAKE ONE TABLET BY MOUTH DAILY 4/12/21  Yes Dianne Rahman DO       Allergies:  Ativan [lorazepam], Clarithromycin, Gabapentin, Pravastatin, Promethazine-codeine, Vicodin [hydrocodone-acetaminophen], and Zegerid [omeprazole]    Social History:      The patient currently lives with wife and children    TOBACCO:   reports that he quit smoking about 44 years ago. He has a 8.50 pack-year smoking history. He has never used smokeless tobacco.  ETOH:   reports no history of alcohol use. E-Cigarettes/Vaping Use     Questions Responses    E-Cigarette/Vaping Use Never User    Start Date     Passive Exposure No    Quit Date     Counseling Given No    Comments             Family History:            Problem Relation Age of Onset    High Blood Pressure Mother     High Blood Pressure Father     Cancer Father         lung    High Blood Pressure Sister     High Blood Pressure Brother        REVIEW OF SYSTEMS COMPLETED:   Pertinent positives as noted in the HPI. All other systems reviewed and negative. PHYSICAL EXAM PERFORMED:    BP (!) 152/76   Pulse 67   Temp 98.2 °F (36.8 °C) (Oral)   Resp 18   Ht 5' 6\" (1.676 m)   Wt 135 lb 8 oz (61.5 kg)   SpO2 98%   BMI 21.87 kg/m²     General appearance: Head of bed elevated states can only sleep on his back because of the pain   HEENT: PERRL, EOMI, anicteric sclerae oral mucosa slightly dry  Neck: Supple, full range of motion. No JVD trachea midline. Respiratory:  Normal effort. Clear to auscultation  Cardiovascular:  S1/S2 RRR, no murmurs, rubs or gallops. Abdomen: Soft, tender RUQ over biliary drain non-distended hypoactive bowel sounds. Percutaneous gallbladder drain with thick brown dark blood-tinged drainage small amount dressing to site CDI  Musculoskeletal:  No clubbing, cyanosis or edema   Skin: Skin color warm and dry slight jaundice, texture, turgor normal.  No rashes or lesions. Neurologic:  Neurovascularly intact without any focal sensory/motor deficits.  Cranial nerves: II-XII intact, grossly non-focal.  Psychiatric:  Alert and oriented, thought content appropriate, normal insight  Peripheral Pulses: +2 palpable, equal bilaterally       Labs:     Recent Labs     07/28/21  1327 07/28/21 2025   WBC 7.3  --    HGB 12.3* 12.2*   HCT 36.3* 36.0*     --      Recent Labs     07/28/21 2024   *   K 4.0   CL 98*   CO2 22   BUN 27*   CREATININE 1.0   CALCIUM 8.8     Recent Labs     07/28/21 2024   *   ALT 48*   BILITOT 3.9*   ALKPHOS 689*     Recent Labs     07/28/21 1327   INR 1.08     No results for input(s): Towana Ball in the last 72 hours. Urinalysis:      Lab Results   Component Value Date    NITRU Negative 10/07/2020    BLOODU Negative 10/07/2020    SPECGRAV 1.015 10/07/2020    GLUCOSEU Negative 10/07/2020       Radiology:       No orders to display       ASSESSMENT:    Active Hospital Problems    Diagnosis Date Noted    Biliary obstruction [K83.1] 07/28/2021     Pancreatic cancer with lung metastasis  Biliary obstruction  Migration of biliary stent into cystic duct  S/p percutaneous drainage gallbladder    --Continue Creon  --Post procedures clear liquid diet  --Recheck LFT in a.m. for improvement  --Admitted OBS status  -- spoke of proceeding with port placement and chemo radiation therapy query patient when this is scheduled for, is this to be done outpatient  Notes will eventually need internalization of the PTC drainage into an intraductal biliary stent    --Recheck labs advance diet if okay with GI, if tolerated plan DC home    HTN  --Continue lisinopril/HCTZ 10/12.5 mg p.o. daily    Anemia of chronic disease/cancer  --Hgb stable 12.2    Insomnia  --States his sleep med was recently increased to 2 tabs.   Ambien 5 mg at bedtime as needed(computer flags for high dose if attempt to order 10 mg)  --Add melatonin as needed  --Has Ativan 0.5 mg at bedtime as needed    Right upper quadrant abdominal pain  --Despite gallbladder drain placement he is having 10 out of 10 sharp pain to RUQ. Even talking exacerbates the pain  --Tolerated CL diet  --Dr Ani Tripp ot address in am  --Patient does not wish to have the port placed this admission    DVT Prophylaxis: Lovenox 40 mg subcu daily  Diet: ADULT DIET; Clear Liquid  Code Status: Full Code    PT/OT Eval Status: Independent    Dispo -likely DC home today       Yeny Norris MD    Thank you Irina Prescott DO for the opportunity to be involved in this patient's care. If you have any questions or concerns please feel free to contact me at [phone].

## 2021-07-29 NOTE — DISCHARGE INSTR - DIET

## 2021-07-29 NOTE — PROGRESS NOTES
Spoke with [employer] in [place] r/t recent pain medication refills per Dr. Angela Kaplan request pharmacist stated that patient had 180 Oxycodone 5mg tabs filled on July 10 for a 30 day supply Dr. Angela Kaplan notified

## 2021-07-29 NOTE — PROGRESS NOTES
Discharge instructions gone over with patient, wife, and son understanding voiced IV removed prior to discharge patient tolerated well patient discharged in stable condition at this time

## 2021-07-29 NOTE — CARE COORDINATION
Discharge order noted. No new needs identified. IPTA. Will DC ho9me today. Discharge timeout done with Mohsen Monet. All discharge needs and concerns addressed.

## 2021-07-29 NOTE — FLOWSHEET NOTE
Patient admitted to room 203-1 from PACU in stable condition. Patient oriented to room, call light, bed rails, phone, lights and bathroom. Patient instructed about the schedule of the day including: vital sign frequency, lab draws, possible tests, frequency of MD and staff rounds, daily weights, I &O's, and prescribed diet. Bed alarm deferred, patient medium fall risk. Bed locked, in lowest position, side rails up 2/4, call light within reach. Recliner Assessment:     Patient is able to demonstrate the ability to move from a reclining position to an upright position within the recliner.           07/28/21 1930   Vital Signs   Temp 98.1 °F (36.7 °C)   Temp Source Oral   Pulse 68   Heart Rate Source Monitor   Resp 18   BP (!) 146/77   BP Location Left upper arm   Patient Position Semi fowlers   Level of Consciousness Alert (0)   MEWS Score 1   Patient Currently in Pain Yes   Pain Assessment   Pain Assessment 0-10   Pain Level 5   Pain Type Acute pain;Surgical pain   Pain Location Abdomen  (biliary drain site)   Pain Orientation Right;Upper   Pain Descriptors Sharp   Pain Frequency Intermittent   Pain Onset Sudden   Oxygen Therapy   SpO2 98 %   O2 Device None (Room air)   Height and Weight   Height 5' 6\" (1.676 m)   Weight 135 lb 8 oz (61.5 kg)   Weight Method Actual;Bed scale   BSA (Calculated - sq m) 1.69 sq meters   BMI (Calculated) 21.9

## 2021-07-29 NOTE — FLOWSHEET NOTE
Patient c/o pain. +facial grimacing, abd guarding. Prn oxy IR given.        07/29/21 0338   Vital Signs   Patient Currently in Pain Yes   Pain Assessment   Pain Assessment 0-10   Pain Level 10   Pain Type Acute pain;Surgical pain   Pain Location Abdomen   Pain Orientation Right;Upper   Pain Descriptors Sharp   Pain Onset On-going   Non-Pharmaceutical Pain Intervention(s) Cold applied;Splinting

## 2021-07-30 NOTE — PROGRESS NOTES
Obstructive Sleep Apnea (JOHNNY) Screening     Patient:  Jere De Leon    YOB: 1945      Medical Record #:  [id number]                     Date:  7/30/2021     1. Are you a loud and/or regular snorer? []  Yes       [x] No    2. Have you been observed to gasp or stop breathing during sleep? []  Yes       [x] No    3. Do you feel tired or groggy upon awakening or do you awaken with a headache?           []  Yes       [] No    4. Are you often tired or fatigued during the wake time hours? []  Yes       [] No    5. Do you fall asleep sitting, reading, watching TV or driving? []  Yes       [] No    6. Do you often have problems with memory or concentration? []  Yes       [] No    **If patient's score is ? 3 they are considered high risk for JOHNNY. An Anesthesia provider will evaluate the patient and develop a plan of care the day of surgery. Note:  If the patient's BMI is more than 35 kg m¯² , has neck circumference > 40 cm, and/or high blood pressure the risk is greater (© American Sleep Apnea Association, 2006).

## 2021-07-30 NOTE — PROGRESS NOTES
Fernanda Bargerilor    Age 76 y.o.    male    1945    MRN [id number]    8/11/2021  Arrival Time_____________  OR Time____________[address] [place]     Procedure(s):  SURGICAL PORT PLACEMENT                      Monitor Anesthesia Care     Surgeon(s):  Nain De Anda, MD      DAY ADMIT ___  SDS/OP ___  OUTPT IN BED ___         Phone [phone] (home)    PCP _____________________ Phone_________________ Epic ( ) Epic CE ( ) Appt ________    ADDITIONAL INFO __________________________________ Cardio/Consult _____________    NOTES _____________________________________________________________________    ____________________________________________________________________________    PAT APPT DATE:________ TIME: ________  FAXED QAD: _______  (__) H&P w/ hospitalist  ____________________________________________________________________________    COVID TEST: Date/Location______________        NURSING HISTORY COMPLETE: _______  (__) CBC       (__) W/ DIFF ___________  (__)  ECHO    __________  (__) Hgb A1C    ___________  (__) CHEST X RAY   __________  (__) LIPID PROFILE  ___________  (__) EKG   __________  (__) PT/PTT   ___________  (__) PFT's   __________  (__) BMP   ___________  (__) CAROTIDS  __________  (__) CMP   ___________  (__) VEIN MAPPING  __________  (__) U/A   ___________  (__) HISTORY & PHYSICAL __________  (__) URINE C & S  ___________  (__) CARDIAC CLEARANCE __________  (__) U/A W/ FLEX  ___________  (__) PULM.  CLEARANCE __________  (__) SERUM PREGNANCY ___________  (__) Check Epic DOS orders __________  (__) TYPE & SCREEN ________ repeat ( ) (__)  __________________ __________  (__) ALBUMIN   ___________  (__)  __________________ __________  (__) TRANSFERRIN  ___________  (__)  __________________ __________  (__) LIVER PROFILE  ___________  (__)  __________________ __________  (__) CARBOXY HGB  ___________  (__) URINE PREG DOS __________  (__) NICOTINE & MET.  ___________  (__) BLOOD SUGAR DOS __________  (__) PREALBUMIN  ___________    (__) MRSA NASAL SWAB ___________  (__) BLOOD THINNERS __________  (__) ACE/ ARBS: _____________________    (__) BETABLOCKERS ___________________

## 2021-07-30 NOTE — CARE COORDINATION
Alessandra 45 Transitions Initial Follow Up Call    Call within 2 business days of discharge: Yes    Patient: Tomasz Benson Patient : 1945   MRN: [id number]  Reason for Admission: pancreatic cancer with lung mets, biliary obstruction, migration of biliary stent into cystic duct, s/p percutaneous drainage gallbladder, HTN, anemia of chronic disease/cancer, insomnia, RUQ abd pain  Discharge Date: 21 RARS: No data recorded    Last Discharge [facility]       Complaint Diagnosis Description Type Department Provider    21   Admission (Discharged) [facility] 2 Fabricio Castillo MD    21   Admission (Discharged) [facility] Dolores Becerra MD           Spoke with: Tomasz Benson (patient)     Facility: [facility]    Non-face-to-face services provided:  Obtained and reviewed discharge summary and/or continuity of care documents  Education of patient/family/caregiver/guardian to support self-management-instructions AVS page 10  Assessment and support for treatment adherence and medication management-[id number] completed    Was this an external facility discharge? No Discharge Facility: NA    Challenges to be reviewed by the provider   Additional needs identified to be addressed with provider No         Method of communication with provider : none    Advance Care Planning:   Does patient have an Advance Directive:  has ACP docs. Was this a readmission? No  Patient stated reason for admission: abd pain  Patients top risk factors for readmission: medical condition-     Care Transition Nurse (CTN) contacted the patient by telephone to perform post hospital discharge assessment. Verified name and  with patient as identifiers. Provided introduction to self, and explanation of the CTN role. CTN reviewed discharge instructions, medical action plan and red flags with patient who verbalized understanding.  Patient given an opportunity to ask questions and does not have any further questions or concerns at this time. Were discharge instructions available to patient? Yes. Reviewed appropriate site of care based on symptoms and resources available to patient including: PCP and Specialist. The patient agrees to contact the PCP office for questions related to their healthcare. Medication reconciliation was performed with patient, who verbalizes understanding of administration of home medications. COVID Risk Education    COVID-19 testing not done. Patient completed J&J COVID vaccine on 3/4/2021. Patient was given an opportunity to verbalize any questions and concerns and agrees to contact CTN or health care provider for questions related to their healthcare. Was patient discharged with a pulse oximeter? No     Feeling improved since discharge to home. Had 2 episodes of lightheadedness. Tolerating PO now. Plans to decrease dulcolax to daily as BM are now diarrhea. He will titrate and monitor. CTN reviewed AVS page 10 instructions. They have already contacted Dr Garry Hou office for follow up. Office said they had no note for needed follow up visit and they are unsure if they plan to call back. Per notes during hospitalization patient will eventually need internalization of the PTC drainage into an intraductal biliary stent. He has port insertion scheduled for 8/4/2021 per chart review. States he will schedule with PCP after these appointments/prn. They are changing dressing daily to drain site. CTN provided contact information for future needs. Outreach to GI office. They sent message to Dr Garry Hou who said IR would need to provide directions and plan and his office staff will do that today and contact patient. Plan for follow-up call in 5-7 days based on severity of symptoms and risk factors.       Care Transitions 24 Hour Call    Schedule Follow Up Appointment with PCP: Completed  Do you have any ongoing symptoms?: No  Do you have a copy of your discharge instructions?: Yes  Do you have all of your prescriptions and are they filled?: Yes  Have you been contacted by a [address]?: No  Have you scheduled your follow up appointment?: Yes  How are you going to get to your appointment?: Car - family or friend to transport  Were you discharged with any Home Care or Post Acute Services: No  Do you feel like you have everything you need to keep you well at home?: Yes  Care Transitions Interventions  No Identified Needs         Follow Up  No future appointments.     Khalida Hernandez RN

## 2021-07-30 NOTE — TELEPHONE ENCOUNTER
Hortensia Powers from [facility] called wanting to know if Dr Irene Pichardo could do pts Port Placement since Dr Jonas Guadalupe doesn't have any availability until 8/11.   Surgery was changed to Dr Irene Pichardo on 8/4 @ 11:15am arrival 9:15am [facility]  Pts wife notified and Ansley sent sched sheet to [facility] and spoke w/ Hortensia Powers and informed of above noted

## 2021-07-30 NOTE — PROGRESS NOTES
Fernanda Bargerilor    Age 76 y.o.    male    1945    ALLYSON [id number]    8/4/2021  Arrival Time_____________  OR Time____________[address]     Procedure(s):  SURGICAL PORT PLACEMENT                      Monitor Anesthesia Care    Surgeon(s):  Yen Sharp, MD       Phone [phone] (home)     [address]  Cell         Work  _____________________________________________________________________  _____________________________________________________________________  _____________________________________________________________________  _____________________________________________________________________  _____________________________________________________________________    PCP _____________________________ Phone_________________     H&P__________________Bringing      Chart            Epic   DOS      Called________  EKG__________________Bringing      Chart            Epic   DOS      Called________  LAB__________________ Bringing      Chart            Epic   DOS      Called________  Cardiac Clearance_______Bringing      Chart            Epic      DOS      Called________    Cardiologist________________________ Phone___________________________    ? Sikhism concerns / Waiver on Chart            PAT Communications________________  ? Pre-op Instructions Given [place]          _________________________________  ? Directions to Surgery Center                          _________________________________  ? Transportation Home_______________      __________________________________  ?  Crutches/Walker__________________        __________________________________    ________Pre-op Orders   _______Transcribed    _______Wt.  ________Pharmacy          _______SCD  ______VTE     ______TED Maryfrances Green  _______  Surgery Consent    _______  Anesthesia Consent         COVID DATE______________LOCATION________________ RESULT__________     2ND VACCINE DATE_____________

## 2021-08-03 NOTE — ANESTHESIA PRE PROCEDURE
Department of Anesthesiology  Preprocedure Note       Name:  Sathish Almanza   Age:  76 y.o.  :  1945                                          MRN:  [id number]         Date:  8/3/2021      Surgeon: Latisha Combs):  Дмитрий Coleman MD    Procedure: Procedure(s):  SURGICAL PORT PLACEMENT    Medications prior to admission:   Prior to Admission medications    Medication Sig Start Date End Date Taking? Authorizing Provider   Magnesium Hydroxide (DULCOLAX PO) Take by mouth as needed     Historical Provider, MD   oxyCODONE (ROXICODONE) 5 MG immediate release tablet Take 5 mg by mouth every 4 hours as needed for Pain. Historical Provider, MD GREEN [id number] units CPEP delayed release capsule 3 times daily (with meals) 21   Historical Provider, MD   zolpidem (AMBIEN) 5 MG tablet Take 5 mg by mouth nightly as needed. 21   Historical Provider, MD   tamsulosin (FLOMAX) 0.4 MG capsule Take 1 capsule by mouth daily For urine flow 21   Ezio Paige, DO   sildenafil (VIAGRA) 100 MG tablet TAKE ONE TABLET BY MOUTH AS NEEDED FOR ERECTILE DYSFUNCTION **NO MORE THAN ONE TABLET IN 24 HOURS** 21   Ezio Paige, DO   aspirin 81 MG EC tablet Take 81 mg by mouth daily    Historical Provider, MD   lisinopril-hydroCHLOROthiazide (PRINZIDE;ZESTORETIC) 10-12.5 MG per tablet TAKE ONE TABLET BY MOUTH DAILY 21   Ezio Paige, DO       Current medications:    No current facility-administered medications for this encounter. Current Outpatient Medications   Medication Sig Dispense Refill    Magnesium Hydroxide (DULCOLAX PO) Take by mouth as needed       oxyCODONE (ROXICODONE) 5 MG immediate release tablet Take 5 mg by mouth every 4 hours as needed for Pain.  CREON 86996-937028 units CPEP delayed release capsule 3 times daily (with meals)      zolpidem (AMBIEN) 5 MG tablet Take 5 mg by mouth nightly as needed.       tamsulosin (FLOMAX) 0.4 MG capsule Take 1 capsule by mouth daily For urine flow 30 capsule 3    sildenafil (VIAGRA) 100 MG tablet TAKE ONE TABLET BY MOUTH AS NEEDED FOR ERECTILE DYSFUNCTION **NO MORE THAN ONE TABLET IN 24 HOURS** 6 tablet 3    aspirin 81 MG EC tablet Take 81 mg by mouth daily      lisinopril-hydroCHLOROthiazide (PRINZIDE;ZESTORETIC) 10-12.5 MG per tablet TAKE ONE TABLET BY MOUTH DAILY 90 tablet 2       Allergies:     Allergies   Allergen Reactions    Ativan [Lorazepam]     Clarithromycin     Gabapentin     Pravastatin     Promethazine-Codeine     Vicodin [Hydrocodone-Acetaminophen]      Causes prostate swelling- needs to take terazosin if takes vicodin    Zegerid [Omeprazole]        Problem List:    Patient Active Problem List   Diagnosis Code    Left TKR Z96.659    HTN (hypertension) I10    GERD (gastroesophageal reflux disease) K21.9    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    Cervical radiculitis M54.12    Ulnar neuropathy of left upper extremity G56.22    Left wrist pain M25.532    Mass of left wrist R22.32    Thoracic or lumbosacral neuritis or radiculitis, unspecified [id number]    Syncope and collapse R55    Other cervical disc degeneration at C6-C7 level M50.323    Other cervical disc degeneration at C4-C5 level M50.321    Hyperlipidemia E78.5    Arthritis of right hand M19.041    Status post total right knee replacement Z96.651    S/P total knee arthroplasty, right Z96.651    Lumbosacral spondylosis without myelopathy M47.817    Biliary obstruction K83.1    Pancreas cancer ([facility] [address] C25.9       Past Medical History:        Diagnosis Date    Arthritis     Cancer ([address] 2021    Pancreatic    GERD (gastroesophageal reflux disease)     resolved with surgery    Hiatal hernia     Hypertension     Retention of urine     with narcotics       Past Surgical History:        Procedure Laterality Date    COLONOSCOPY  10/29/2013    ERCP N/A 7/7/2021    ERCP SPHINCTER/PAPILLOTOMY performed by Martita López MD at [address] [facility] ENDOSCOPY    ERCP  2021    ERCP STENT INSERTION performed by Lety Cade MD at [facility] ERCP N/A 2021    ERCP STONE REMOVAL    ERCP N/A 2021    ERCP STONE REMOVAL performed by Lety Cade MD at [address] [place] [address] Left 10/04/2017    excision left forearm mass    GASTRIC FUNDOPLICATION  5439    HERNIA REPAIR  2015    ROBOTIC ASSISTED HIATAL HERNIA REPAIR WITH [facility]ISSEN    IR BILIARY DRAIN EXTERNAL  2021    IR BILIARY DRAIN EXTERNAL 2021 [facility] PROC[facility]    JOINT REPLACEMENT Left 13    left total knee replacement    JOINT REPLACEMENT Right 2019    right TKR    KNEE ARTHROSCOPY      left    OTHER SURGICAL HISTORY  2021    ERCP WF W/upper EUS W/Metal biliary stent replacement W/Anes.  OTHER SURGICAL HISTORY N/A 2021    PERCUTANEOUS GALLBLADDER DRAIN PLACEMENT    SPINE SURGERY N/A 2021    PERCUTANEOUS GALLBLADDER DRAIN PLACEMENT performed by Papi Oscar MD at [address] Right 10/22/2020    RIGHT TOTAL KNEE REPLACEMENT      CELAYA & NEPHEW performed by Xena Edmondson MD at [address] [facility] ENDOSCOPY  2015    bx    UPPER GASTROINTESTINAL ENDOSCOPY N/A 2021    UPPER EUS W/ANES.  performed by Lety Cade MD at [address] [facility]  2021    EGD BIOPSY performed by Lety Cade MD at [address] [place], [place] [address]         Social History:    Social History     Tobacco Use    Smoking status: Former Smoker     Packs/day: 0.50     Years: 17.00     Pack years: 8.50     Quit date: 10/21/1976     Years since quittin.8    Smokeless tobacco: Never Used   Substance Use Topics    Alcohol use: No     Alcohol/week: 0.0 standard drinks                                Counseling given: Not Answered      Vital Signs (Current):   Vitals: 07/30/21 1421   Weight: 135 lb (61.2 kg)   Height: 5' 6\" (1.676 m)                                              BP Readings from Last 3 Encounters:   07/29/21 (!) 146/79   07/28/21 (!) 111/58   07/28/21 130/63       NPO Status:                                                                                 BMI:   Wt Readings from Last 3 Encounters:   07/28/21 135 lb 8 oz (61.5 kg)   07/28/21 130 lb (59 kg)   07/07/21 137 lb (62.1 kg)     Body mass index is 21.79 kg/m². CBC:   Lab Results   Component Value Date    WBC 7.9 07/29/2021    RBC 3.53 07/29/2021    HGB 11.4 07/29/2021    HCT 33.1 07/29/2021    MCV 93.7 07/29/2021    RDW 16.6 07/29/2021     07/29/2021       CMP:   Lab Results   Component Value Date     07/29/2021    K 3.9 07/29/2021    CL 99 07/29/2021    CO2 23 07/29/2021    BUN 25 07/29/2021    CREATININE 0.9 07/29/2021    GFRAA >60 07/29/2021    GFRAA >60 02/03/2013    AGRATIO 0.9 07/29/2021    LABGLOM >60 07/29/2021    GLUCOSE 126 07/29/2021    PROT 6.5 07/29/2021    CALCIUM 8.5 07/29/2021    BILITOT 3.4 07/29/2021    ALKPHOS 609 07/29/2021     07/29/2021    ALT 41 07/29/2021       POC Tests: No results for input(s): POCGLU, POCNA, POCK, POCCL, POCBUN, POCHEMO, POCHCT in the last 72 hours.     Coags:   Lab Results   Component Value Date    PROTIME 12.2 07/28/2021    INR 1.08 07/28/2021    APTT 33.3 10/07/2020       HCG (If Applicable): No results found for: PREGTESTUR, PREGSERUM, HCG, HCGQUANT     ABGs: No results found for: PHART, PO2ART, EAF5PSK, ZKI2QXV, BEART, M1VQOTXZ     Type & Screen (If Applicable):  Lab Results   Component Value Date    LABABO O 01/22/2013    [address] Positive 01/22/2013       Drug/Infectious Status (If Applicable):  No results found for: HIV, HEPCAB    COVID-19 Screening (If Applicable):   Lab Results   Component Value Date    COVID19 NOT DETECTED 10/16/2020           Anesthesia Evaluation  Patient summary reviewed and Nursing notes reviewed no history of

## 2021-08-04 NOTE — H&P
I have reviewed the history and physical and examined the patient. I find no relevant changes. I have reviewed with the patient and/or family members, during the preoperative office visit the risks, benefits, and alternatives to the procedure.     Travon Lindquist MD

## 2021-08-05 PROBLEM — R50.9 FEVER AND CHILLS: Status: ACTIVE | Noted: 2021-01-01

## 2021-08-05 NOTE — CONSULTS
Gastroenterology Consult Note    Patient:   Bobbi Marx   YOB: 1945   Facility:   Health system   Referring/PCP: Blayne Ortega DO  Date:     8/5/2021  Consultant:   Christopher Alberto MD, MD    Subjective: This 76 y.o. male was admitted 8/4/2021 with a diagnosis of \"Fever and chills [R50.9]\" and is seen in consultation regarding \"biliary narrowing\". Information was obtained from interview of  the patient, examination of the patient, and review of records. I did  update the past medical, surgical, social and / or family history. Has biliary narrowing moderate in bile duct chronic assoc w high bili    Current status  Present  Diet Order: ADULT DIET; Regular and he is tolerating diet. Recently, he has experienced no abdominal  Pain and he has not required Intravenous narcotic analgesics. The patient has also experienced no constipation, diarrhea, fever, hematochezia, melena, nausea and vomiting      Prior to Admission medications    Medication Sig Start Date End Date Taking? Authorizing Provider   ondansetron (ZOFRAN) 8 MG tablet  7/16/21  Yes Historical Provider, MD   prochlorperazine (COMPAZINE) 10 MG tablet  7/16/21  Yes Historical Provider, MD   Magnesium Hydroxide (DULCOLAX PO) Take by mouth as needed    Yes Historical Provider, MD   oxyCODONE (ROXICODONE) 5 MG immediate release tablet Take 5 mg by mouth every 4 hours as needed for Pain. Yes Historical Provider, MD   CREON [id number] units CPEP delayed release capsule 3 times daily (with meals) 7/7/21  Yes Historical Provider, MD   zolpidem (AMBIEN) 5 MG tablet Take 5 mg by mouth nightly as needed.  7/19/21  Yes Historical Provider, MD   tamsulosin (FLOMAX) 0.4 MG capsule Take 1 capsule by mouth daily For urine flow 7/12/21  Yes Blayne Ortega DO   sildenafil (VIAGRA) 100 MG tablet TAKE ONE TABLET BY MOUTH AS NEEDED FOR ERECTILE DYSFUNCTION **NO MORE THAN ONE TABLET IN 24 HOURS** 6/14/21  Yes Blayne Ortega DO   aspirin 81 MG EC tablet Take 81 mg by mouth daily   Yes Historical Provider, MD   lisinopril-hydroCHLOROthiazide (PRINZIDE;ZESTORETIC) 10-12.5 MG per tablet TAKE ONE TABLET BY MOUTH DAILY 4/12/21   Rambo Fernando, DO      Scheduled Medications:    sodium chloride flush  10 mL Intravenous 2 times per day    tamsulosin  0.4 mg Oral Daily    lipase-protease-amylase  36,000 Units Oral TID WC    aspirin  81 mg Oral Daily    cefepime  2,000 mg Intravenous Q12H     Infusions:    sodium chloride       PRN Medications: sodium chloride flush, sodium chloride, potassium chloride, magnesium sulfate, promethazine **OR** ondansetron, acetaminophen **OR** acetaminophen, oxyCODONE, oxyCODONE, zolpidem, bisacodyl  Allergies:    Allergies   Allergen Reactions    Ativan [Lorazepam]     Clarithromycin     Gabapentin     Pravastatin     Promethazine-Codeine     Vicodin [Hydrocodone-Acetaminophen]      Causes prostate swelling- needs to take terazosin if takes vicodin    Zegerid [Omeprazole]        Past Medical History:   Diagnosis Date    Arthritis     Cancer ([facility] [address] 2021    Pancreatic    GERD (gastroesophageal reflux disease)     resolved with surgery    Hiatal hernia     Hypertension     Retention of urine     with narcotics     Past Surgical History:   Procedure Laterality Date    COLONOSCOPY  10/29/2013    ERCP N/A 7/7/2021    ERCP SPHINCTER/PAPILLOTOMY performed by Palmer Farooq MD at [address] [facility] ERCP  7/7/2021    ERCP STENT INSERTION performed by Palmer Farooq MD at [facility] ERCP N/A 07/28/2021    ERCP STONE REMOVAL    ERCP N/A 7/28/2021    ERCP STONE REMOVAL performed by Palmer Farooq MD at [facility] 10/04/2017    excision left forearm mass    GASTRIC FUNDOPLICATION  feb 9646    HERNIA REPAIR  02/19/2015    ROBOTIC ASSISTED HIATAL HERNIA REPAIR WITH NISSEN    IR BILIARY DRAIN EXTERNAL  7/28/2021    IR BILIARY DRAIN EXTERNAL 2021 [facility] SPECIAL PROCEDURES    JOINT REPLACEMENT Left 13    left total knee replacement    JOINT REPLACEMENT Right 2019    right TKR    KNEE ARTHROSCOPY      left    OTHER SURGICAL HISTORY  2021    ERCP WF W/upper EUS W/Metal biliary stent replacement W/Anes.  OTHER SURGICAL HISTORY N/A 2021    PERCUTANEOUS GALLBLADDER DRAIN PLACEMENT    PORT SURGERY N/A 2021    SURGICAL PORT PLACEMENT performed by Joel Figueredo MD at [facility] N/A 2021    PERCUTANEOUS GALLBLADDER DRAIN PLACEMENT performed by Papi Oscar MD at [address] Right 10/22/2020    RIGHT TOTAL KNEE REPLACEMENT      CELAYA & NEPHEW performed by Xena Edmondson MD at [address] ENDOSCOPY      bx    UPPER GASTROINTESTINAL ENDOSCOPY N/A 2021    UPPER EUS W/ANES. performed by Lety Cade MD at [facility] 5  2021    EGD BIOPSY performed by Lety Cade MD at [facility]         Social:   Social History     Tobacco Use    Smoking status: Former Smoker     Packs/day: 0.50     Years: 17.00     Pack years: 8.50     Quit date: 10/21/1976     Years since quittin.8    Smokeless tobacco: Never Used   Substance Use Topics    Alcohol use: No     Alcohol/week: 0.0 standard drinks     Family:   Family History   Problem Relation Age of Onset    High Blood Pressure Mother     High Blood Pressure Father     Cancer Father         lung    High Blood Pressure Sister     High Blood Pressure Brother        ROS: Pertinent items are noted in HPI.     Objective:   Vital Signs:  Temp (24hrs), Av.5 °F (36.9 °C), Min:97.4 °F (36.3 °C), Max:99.9 °F (91.7 °C)     Systolic (62TPC), FEH:738 , Min:106 , WWM:106      Diastolic (19VNC), PLM:80, Min:50, Max:88     Pulse  Av  Min: 71  Max: 99  /63   Pulse 87   Temp 98.2 °F (36.8 °C) (Oral)   Resp 16   Ht 5' 6\" (1.676 m)   Wt 135 lb 8 oz (61.5 kg)   SpO2 98%   BMI 21.87 kg/m²      Physical Exam:   /63   Pulse 87   Temp 98.2 °F (36.8 °C) (Oral)   Resp 16   Ht 5' 6\" (1.676 m)   Wt 135 lb 8 oz (61.5 kg)   SpO2 98%   BMI 21.87 kg/m²   General appearance: alert, appears stated age and cooperative  Lungs: clear to auscultation bilaterally  Chest wall: no tenderness  Heart: regular rate and rhythm, S1, S2 normal, no murmur, click, rub or gallop  Abdomen: soft, non-tender; bowel sounds normal; no masses,  no organomegaly  Extremities: extremities normal, atraumatic, no cyanosis or edema  Skin: Skin color, texture, turgor normal. No rashes or lesions  Neurologic: Grossly normal    Lab and Imaging Review   Recent Labs     08/04/21  2213   WBC 11.8*   HGB 11.7*   MCV 91.3      *   K 4.0   CL 99   CO2 19*   BUN 18   CREATININE 0.8   GLUCOSE 185*   CALCIUM 7.8*   PROT 6.7   LABALBU 2.9*   *   ALT 43*   ALKPHOS 420*   BILITOT 6.6*   BILIDIR 4.4*   LIPASE 34.0       Assessment:     Patient Active Problem List    Diagnosis Date Noted    Fever and chills 08/05/2021    Pancreas cancer ([facility] [address] 07/29/2021    Biliary obstruction 07/28/2021    Status post total right knee replacement 10/22/2020    S/P total knee arthroplasty, right 10/22/2020    Arthritis of right hand 01/20/2020    Syncope and collapse 11/19/2018    Mass of left wrist 09/25/2017    Ulnar neuropathy of left upper extremity 08/21/2017    Left wrist pain 08/21/2017    Other cervical disc degeneration at C6-C7 level 03/31/2017    Other cervical disc degeneration at C4-C5 level 03/31/2017    Hyperlipidemia 03/31/2017    Cervical radiculitis 01/17/2017    Bilateral high frequency sensorineural hearing loss 06/16/2016    HTN (hypertension) 05/13/2014    GERD (gastroesophageal reflux disease) 05/13/2014    Left TKR 01/31/2013    Thoracic or lumbosacral neuritis or radiculitis, unspecified 11/22/2010    Lumbosacral spondylosis without myelopathy 11/22/2010     76year old male with metastatic pancreas cancer s/p bile duct metal stent by Dr Victoriano Ansari in 7/2021 that migrated up into the cystic duct and he was unable to remove, so an external biliary drain was placed. His bili is 6.6, but he has no GI Sx.    Plan:   1. OK to D/C home in am if bili is stable  2. Diet today  3. Will consider outpatient repeat ERCP to place another stent in, but after discussion w Dr Victoriano Ansari, he believes repeat ERCP will not help because the stent is \"embedded in the cystic duct\". Therefore, I recommend having IR try to flush/manipulate the external biliary drain  4.  Will follow    Denise Lopez MD       O) [phone]

## 2021-08-05 NOTE — FLOWSHEET NOTE
Time out completed at 1537 prior to start of procedure. Pt arrived for image guided evaluation biliary drain. Procedure explained including the risk and benefits of the procedure. All questions answered. Pt verbalizes understanding of the procedure and states no more questions. Consent confirmed. Vital signs stable. Labs, allergies, medications, and code status reviewed. No contraindications noted.      Vital Signs  Vitals:    08/05/21 1459   BP: (!) 149/69   Pulse: 73   Resp: 16   Temp: 98.3 °F (36.8 °C)   SpO2: 96%    (vital signs in table format)      Allergies  Ativan [lorazepam], Clarithromycin, Gabapentin, Pravastatin, Promethazine-codeine, Vicodin [hydrocodone-acetaminophen], and Zegerid [omeprazole] (allergies)    Labs  Lab Results   Component Value Date    INR 1.08 07/28/2021    PROTIME 12.2 07/28/2021     Lab Results   Component Value Date    CREATININE 0.8 08/04/2021    BUN 18 08/04/2021     (L) 08/04/2021    GFR >60 02/03/2013    K 4.0 08/04/2021    CL 99 08/04/2021    CO2 19 (L) 08/04/2021     Lab Results   Component Value Date    WBC 11.8 (H) 08/04/2021    HGB 11.7 (L) 08/04/2021    HCT 34.2 (L) 08/04/2021    MCV 91.3 08/04/2021     08/04/2021

## 2021-08-05 NOTE — H&P
Hospital Medicine History & Physical      PCP: Amalia Rocha DO    Date of Admission: 8/4/2021    Chief Complaint:  fever    History Of Present Illness:  Patient is a 31-year-old male with past medical history of pancreatic cancer, hypertension, GERD who presented to hospital after an episode of fever at home. Patient mentions he recently had surgery with a stent in bile duct that migrated into cystic duct. He denied abdominal pain, nausea vomiting diarrhea constipation dysuria fevers or chills at time of my examination. Past Medical History:          Diagnosis Date    Arthritis     Cancer [facility]) 2021    Pancreatic    GERD (gastroesophageal reflux disease)     resolved with surgery    Hiatal hernia     Hypertension     Retention of urine     with narcotics       Past Surgical History:          Procedure Laterality Date    COLONOSCOPY  10/29/2013    ERCP N/A 7/7/2021    ERCP SPHINCTER/PAPILLOTOMY performed by Kamlesh Mclean MD at [facility] ERCP  7/7/2021    ERCP STENT INSERTION performed by Kamlesh Mclean MD at [facility] ERCP N/A 07/28/2021    ERCP STONE REMOVAL    ERCP N/A 7/28/2021    ERCP STONE REMOVAL performed by Kamlesh Mclean MD at [address] [facility] Left 10/04/2017    excision left forearm mass    GASTRIC FUNDOPLICATION  feb 7498   [address]  02/19/2015    ROBOTIC ASSISTED HIATAL HERNIA REPAIR WITH NISSEN    IR BILIARY DRAIN EXTERNAL  7/28/2021    IR BILIARY DRAIN EXTERNAL 7/28/2021 [address] SPECIAL PROCEDURES    JOINT REPLACEMENT Left 01/31/13    left total knee replacement    JOINT REPLACEMENT Right 2019    right TKR    KNEE ARTHROSCOPY      left    OTHER SURGICAL HISTORY  07/07/2021    ERCP WF W/upper EUS W/Metal biliary stent replacement W/Anes.     OTHER SURGICAL HISTORY N/A 07/28/2021    PERCUTANEOUS GALLBLADDER DRAIN PLACEMENT    PORT SURGERY N/A 8/4/2021    SURGICAL PORT PLACEMENT performed by Jerardo Arevalo MD at [facility] N/A 7/28/2021    PERCUTANEOUS GALLBLADDER DRAIN PLACEMENT performed by Maryjane Buerger, MD at [address] Right 10/22/2020    RIGHT TOTAL KNEE REPLACEMENT      CELAYA & NEPHEW performed by Batsheva Kumar MD at [address] [facility] [address]  2015    bx    UPPER GASTROINTESTINAL ENDOSCOPY N/A 7/7/2021    UPPER EUS W/ANES. performed by Sukhi Kay MD at [facility]  7/7/2021    EGD BIOPSY performed by Sukhi Kay MD at [address] [facility] [facility]         Medications Prior to Admission:      Prior to Admission medications    Medication Sig Start Date End Date Taking? Authorizing Provider   ondansetron (ZOFRAN) 8 MG tablet Take by mouth as needed for Nausea or Vomiting (after chemo)  7/16/21  Yes Historical Provider, MD   prochlorperazine (COMPAZINE) 10 MG tablet 10 mg every 6 hours as needed  7/16/21  Yes Historical Provider, MD   Magnesium Hydroxide (DULCOLAX PO) Take 1 tablet by mouth as needed    Yes Historical Provider, MD   oxyCODONE (ROXICODONE) 5 MG immediate release tablet Take 5 mg by mouth every 4 hours as needed for Pain. Yes Historical Provider, MD   CREON [id number] units CPEP delayed release capsule 2 capsules 3 times daily (with meals)  7/7/21  Yes Historical Provider, MD   zolpidem (AMBIEN) 5 MG tablet Take 5 mg by mouth nightly as needed.  7/19/21  Yes Historical Provider, MD   tamsulosin (FLOMAX) 0.4 MG capsule Take 1 capsule by mouth daily For urine flow 7/12/21  Yes Radha Brasher DO   sildenafil (VIAGRA) 100 MG tablet TAKE ONE TABLET BY MOUTH AS NEEDED FOR ERECTILE DYSFUNCTION **NO MORE THAN ONE TABLET IN 24 HOURS** 6/14/21  Yes Radha Brasher DO   aspirin 81 MG EC tablet Take 81 mg by mouth daily   Yes Historical Provider, MD   lisinopril-hydroCHLOROthiazide [facility]) 10-12.5 MG per tablet TAKE ONE TABLET BY MOUTH DAILY  Patient taking differently: Take 1 tablet by mouth daily Patient is currently not taking this due to recent hypotension 4/12/21   Burley Hamman, DO       Allergies:  Ativan [lorazepam], Clarithromycin, Gabapentin, Pravastatin, Promethazine-codeine, Vicodin [hydrocodone-acetaminophen], and Zegerid [omeprazole]    Social History:      TOBACCO:   reports that he quit smoking about 44 years ago. He has a 8.50 pack-year smoking history. He has never used smokeless tobacco.  ETOH:   reports no history of alcohol use. Family History:       Reviewed in detail and non contributory          Problem Relation Age of Onset    High Blood Pressure Mother     High Blood Pressure Father     Cancer Father         lung    High Blood Pressure Sister     High Blood Pressure Brother        REVIEW OF SYSTEMS:   Pertinent positives as noted in the HPI. All other systems reviewed and negative. PHYSICAL EXAM PERFORMED:    BP (!) 157/69   Pulse 86   Temp 98.3 °F (36.8 °C) (Oral)   Resp 16   Ht 5' 6\" (1.676 m)   Wt 135 lb 8 oz (61.5 kg)   SpO2 95%   BMI 21.87 kg/m²     General appearance:  No apparent distress, cooperative. HEENT:  Normal cephalic, atraumatic without obvious deformity. Conjunctivae/corneas clear. Neck: Supple, with full range of motion. No cervical lymphadenopathy  Respiratory:  Normal respiratory effort. Clear to auscultation, bilaterally without Rales/Wheezes/Rhonchi. Cardiovascular:  Regular rate and rhythm with normal S1/S2 without murmurs, rubs or gallops. Abdomen: Soft, non-tender, non-distended, normal bowel sounds. Musculoskeletal:  No edema noted bilaterally. No tenderness on palpation   Skin: no rash visible  Neurologic:  Neurologically intact without any focal sensory/motor deficits.   grossly non-focal.  Psychiatric:  Alert and oriented, normal mood  Peripheral Pulses: +2 palpable, equal bilaterally       Labs:     Recent Labs 08/04/21 2213   WBC 11.8*   HGB 11.7*   HCT 34.2*        Recent Labs     08/04/21 2213   *   K 4.0   CL 99   CO2 19*   BUN 18   CREATININE 0.8   CALCIUM 7.8*     Recent Labs     08/04/21 2213   *   ALT 43*   BILIDIR 4.4*   BILITOT 6.6*   ALKPHOS 420*     No results for input(s): INR in the last 72 hours. No results for input(s): [place] in the last 72 hours. Urinalysis:      Lab Results   Component Value Date    NITRU Negative 10/07/2020    BLOODU Negative 10/07/2020    SPECGRAV 1.015 10/07/2020    GLUCOSEU Negative 10/07/2020       Radiology:       IR BILIARY DILITATION   Final Result   Widely patent external biliary drainage catheter. There is also internal   flow bile flow through the duct into the duodenum. CT CHEST WO CONTRAST   Final Result   1. Worsening mild-to-moderate interstitial edema and trace bilateral pleural   effusions likely due to fluid overload. 2. Unchanged multiple solid and ground-glass bilateral pulmonary nodules bear   attention on the next imaging cycle. 3. Unchanged right hepatic mass concerning for metastatic disease. 4. Improved with residual mild intrahepatic duct dilatation status post   external biliary drain. CT ABDOMEN PELVIS W IV CONTRAST Additional Contrast? None   Final Result   1. No evidence for abscess. 2. Possible increased size of the dominant liver lesion though the apparent   change could be due to differences in timing of the contrast bolus. 3. Suspect developing lytic bone metastases. XR CHEST (2 VW)   Final Result   Left pleural effusion with bilateral atelectasis but no evidence for   pneumonia. Active Hospital Problems    Diagnosis Date Noted    Fever and chills [R50.9] 08/05/2021         Patient is a 61-year-old male with past medical history of pancreatic cancer, hypertension, GERD who presented to hospital after an episode of fever at home.   Patient mentions he recently had surgery with a stent in bile duct that migrated into cystic duct. He denied abdominal pain, nausea vomiting diarrhea constipation dysuria fevers or chills at time of my examination. Assessment  Fever, unclear etiology  Hyponatremia  Lactic acidosis  Leukocytosis  History of pancreatic cancer  History of recent biliary stenting    Plan  Started on cefepime for intra-abdominal infection, able to tolerate diet, GI consulted-no plans for intervention planned at this time, ERCP as outpatient basis  Monitor LFTs  Monitor vital signs for any fevers, blood cultures negative to date  Lactic acid resolved  DVT prophylaxis-Lovenox  Diet: ADULT DIET; Regular  Adult Oral Nutrition Supplement; Frozen Oral Supplement  Adult Oral Nutrition Supplement; Standard High Calorie/High Protein Oral Supplement  Code Status: Full Code    PT/OT Eval Status: ordered    Dispo - pending clinical improvement       Jose Raul North MD    The note was completed using EMR and Dragon dictation system. Every effort was made to ensure accuracy; however, inadvertent computerized transcription errors may be present. Thank you Radha Albert DO for the opportunity to be involved in this patient's care. If you have any questions or concerns please feel free to contact me at [phone].     Jose Raul North MD

## 2021-08-05 NOTE — ANESTHESIA POSTPROCEDURE EVALUATION
Department of Anesthesiology  Postprocedure Note    Patient: Gareth Akins  MRN: [id number]  YOB: 1945  Date of evaluation: 8/5/2021  Time:  7:58 AM     Procedure Summary     Date: 08/04/21 Room / Location: [address]    Anesthesia Start: 4030 Anesthesia Stop: 5823    Procedure: SURGICAL PORT PLACEMENT (N/A ) Diagnosis:       Malignant neoplasm of head of pancreas ([address]      (PANCREATIC CANCER)    Surgeons: Jerardo Arevalo MD Responsible Provider: Iver Sandifer, MD    Anesthesia Type: MAC ASA Status: 2          Anesthesia Type: MAC    Angela Phase I:      Angela Phase II: Angela Score: 9    Last vitals: Reviewed and per EMR flowsheets.        Anesthesia Post Evaluation    Patient location during evaluation: PACU  Level of consciousness: awake  Airway patency: patent  Nausea & Vomiting: no nausea  Complications: no  Cardiovascular status: blood pressure returned to baseline  Respiratory status: acceptable  Hydration status: euvolemic

## 2021-08-05 NOTE — CARE COORDINATION
CASE MANAGEMENT INITIAL ASSESSMENT      Reviewed chart and completed assessment at bedside with patient    Explained Case Management role/services. yes    Health Care Decision Maker :   Primary Decision Maker: Camilla Tolliver Spouse - [phone]    Secondary Decision Maker: Drew Arauz - Child - [phone]        Admit date/status: observation  Diagnosis: fever and chills     Is this a Readmission?:  No      Insurance: [place] [employer]     Precert required for SNF: Yes       3 night stay required: No    Living arrangements, Adls, care needs, prior to admission: lives with spouse in house. IPTA. Active     Transportation: likely family     Durable Medical Equipment at home:  Has a walker and cane from previous orthopedic surgery - does NOT currently use    Services in the home and/or outpatient, prior to admission: none    PT/OT recs: [address] [facility] [address] Notification (HEN): n/a    Barriers to discharge: none    Plan/comments: pt intends to discharge home without needs from [facility] team. Please consult CM if needs arise.      Tae Palmer RN

## 2021-08-05 NOTE — ED PROVIDER NOTES
[address]      Pt Name: Coby Nicole  MRN: [id number]  [place] 1945  Date of evaluation: 8/4/2021  Provider: Ariadne Dempsey MD    CHIEF COMPLAINT       Chief Complaint   Patient presents with    Post-op Problem     Pt had port placed today, pt reports felt great today and yesterday. Starting this evening pt spiked a fever, was 102.7 at home. Pt denies additional symptoms, pt reports also had recent stent in right kidney a few weeks ago as well. HISTORY OF PRESENT ILLNESS   (Location/Symptom, Timing/Onset,Context/Setting, Quality, Duration, Modifying Factors, Severity)  Note limiting factors. Coby Nicole is a 76 y.o. male who presents to the emergency department for fever. The patient has a known diagnosis of pancreatic cancer. He was started on chemotherapy on Monday. Today he had a port placed in the right upper chest and he was told should he have a fever he should come into the emergency room. The patient has been generally fatigued but today he seemed more tired than usual.  In the evening the wife noticed that he looked flushed and when she checked his temperature she noticed that he had a fever at 102.7. Therefore they decided to come to the emergency room. Initially the patient had stents placed in his pancreas however when this stopped working he was given a biliary drain. The patient denies any abdominal pain no chest pain no shortness of breath at this time. Nursing notes were reviewed. REVIEW OF SYSTEMS    (2-9 systems for level 4, 10 or more for level 5)     Review of Systems   Constitutional: Positive for fatigue and fever. HENT: Negative for rhinorrhea. Respiratory: Negative for shortness of breath. Cardiovascular: Negative for chest pain. Gastrointestinal: Negative for abdominal pain. Endocrine: Negative for polyuria. Genitourinary: Negative for dysuria. Musculoskeletal: Negative for back pain. Skin: Negative for rash. Neurological: Negative for headaches. PAST MEDICAL HISTORY     Past Medical History:   Diagnosis Date    Arthritis     Cancer [facility]) 2021    Pancreatic    GERD (gastroesophageal reflux disease)     resolved with surgery    Hiatal hernia     Hypertension     Retention of urine     with narcotics         SURGICALHISTORY       Past Surgical History:   Procedure Laterality Date    COLONOSCOPY  10/29/2013    ERCP N/A 7/7/2021    ERCP SPHINCTER/PAPILLOTOMY performed by Donell Morgan MD at [address] ERCP  7/7/2021    ERCP STENT INSERTION performed by Donell Morgan MD at [facility] ERCP N/A 07/28/2021    ERCP STONE REMOVAL    ERCP N/A 7/28/2021    ERCP STONE REMOVAL performed by Donell Morgan MD at [address] Left 10/04/2017    excision left forearm mass    GASTRIC FUNDOPLICATION  feb 3984   [address]  02/19/2015    ROBOTIC ASSISTED HIATAL HERNIA REPAIR WITH NISSEN    IR BILIARY DRAIN EXTERNAL  7/28/2021    IR BILIARY DRAIN EXTERNAL 7/28/2021 [facility]    JOINT REPLACEMENT Left 01/31/13    left total knee replacement    JOINT REPLACEMENT Right 2019    right TKR    KNEE ARTHROSCOPY      left    OTHER SURGICAL HISTORY  07/07/2021    ERCP WF W/upper EUS W/Metal biliary stent replacement W/Anes.  OTHER SURGICAL HISTORY N/A 07/28/2021    PERCUTANEOUS GALLBLADDER DRAIN PLACEMENT    SPINE SURGERY N/A 7/28/2021    PERCUTANEOUS GALLBLADDER DRAIN PLACEMENT performed by Osmar Cedeño MD at [address] [facility] Right 10/22/2020    RIGHT TOTAL KNEE REPLACEMENT      CELAYA & NEPHEW performed by Adeel Combs MD at [facility] ENDOSCOPY  2015    bx    UPPER GASTROINTESTINAL ENDOSCOPY N/A 7/7/2021    UPPER EUS W/ANES.  [facility]  7/7/2021    EGD BIOPSY performed by Yovanny Villalobos MD at [place] [address]       Current Discharge Medication List      CONTINUE these medications which have NOT CHANGED    Details   ondansetron (ZOFRAN) 8 MG tablet       prochlorperazine (COMPAZINE) 10 MG tablet       Magnesium Hydroxide (DULCOLAX PO) Take by mouth as needed       oxyCODONE (ROXICODONE) 5 MG immediate release tablet Take 5 mg by mouth every 4 hours as needed for Pain. CREON 77443-118424 units CPEP delayed release capsule 3 times daily (with meals)      zolpidem (AMBIEN) 5 MG tablet Take 5 mg by mouth nightly as needed.       tamsulosin (FLOMAX) 0.4 MG capsule Take 1 capsule by mouth daily For urine flow  Qty: 30 capsule, Refills: 3      sildenafil (VIAGRA) 100 MG tablet TAKE ONE TABLET BY MOUTH AS NEEDED FOR ERECTILE DYSFUNCTION **NO MORE THAN ONE TABLET IN 24 HOURS**  Qty: 6 tablet, Refills: 3      aspirin 81 MG EC tablet Take 81 mg by mouth daily      lisinopril-hydroCHLOROthiazide (PRINZIDE;ZESTORETIC) 10-12.5 MG per tablet TAKE ONE TABLET BY MOUTH DAILY  Qty: 90 tablet, Refills: 2             ALLERGIES     Ativan [lorazepam], Clarithromycin, Gabapentin, Pravastatin, Promethazine-codeine, Vicodin [hydrocodone-acetaminophen], and Zegerid [omeprazole]    FAMILY HISTORY       Family History   Problem Relation Age of Onset    High Blood Pressure Mother     High Blood Pressure Father     Cancer Father         lung    High Blood Pressure Sister     High Blood Pressure Brother           SOCIAL HISTORY       Social History     Socioeconomic History    Marital status:      Spouse name: None    Number of children: None    Years of education: None    Highest education level: None   Occupational History    None   Tobacco Use    Smoking status: Former Smoker     Packs/day: 0.50     Years: 17.00     Pack years: 8.50     Quit date: 10/21/1976     Years since quittin.8    Smokeless tobacco: Never Used   Vaping Use    Vaping Use: Never used   Substance and Sexual Activity    Alcohol use: No     Alcohol/week: 0.0 standard drinks    Drug use: No    Sexual activity: Not Currently   Other Topics Concern    None   Social History Narrative    None     Social Determinants of Health     Financial Resource Strain:     Difficulty of Paying Living Expenses:    Food Insecurity:     Worried About Running Out of Food in the Last Year:     Ran Out of Food in the Last Year:    Transportation Needs:     Lack of Transportation (Medical):  Lack of Transportation (Non-Medical):    Physical Activity:     Days of Exercise per Week:     Minutes of Exercise per Session:    Stress:     Feeling of Stress :    Social Connections:     Frequency of Communication with Friends and Family:     Frequency of Social Gatherings with Friends and Family:     Attends Baptism Services:     Active Member of Clubs or Organizations:     Attends Club or Organization Meetings:     Marital Status:    Intimate Partner Violence:     Fear of Current or Ex-Partner:     Emotionally Abused:     Physically Abused:     Sexually Abused:        SCREENINGS             PHYSICAL EXAM    (up to 7 for level 4, 8 or more for level 5)     ED Triage Vitals [21 2208]   BP Temp Temp Source Pulse Resp SpO2 Height Weight   110/67 99.9 °F (37.7 °C) Oral 99 16 96 % 5' 6\" (1.676 m) 130 lb (59 kg)       Physical Exam  Vitals and nursing note reviewed. Constitutional:       Appearance: He is well-developed. He is not toxic-appearing or diaphoretic. Comments: Chronically ill-appearing adult male in no acute distress   HENT:      Head: Normocephalic and atraumatic. Right Ear: External ear normal.      Left Ear: External ear normal.      Nose: Nose normal.   Eyes:      General: Scleral icterus present. Right eye: No discharge. Left eye: No discharge.    Cardiovascular: Rate and Rhythm: Normal rate and regular rhythm. Heart sounds: Normal heart sounds. Pulmonary:      Effort: Pulmonary effort is normal. No respiratory distress. Breath sounds: Normal breath sounds. No wheezing or rales. Comments: Newly placed port in the right upper chest appears to be in appropriate position and no signs of surrounding infection. Abdominal:      General: Bowel sounds are normal. There is no distension. Palpations: Abdomen is soft. Tenderness: There is no abdominal tenderness. There is no guarding or rebound. Comments: Biliary drain on the right upper quadrant   Musculoskeletal:      Cervical back: Neck supple. Skin:     Coloration: Skin is jaundiced. Skin is not pale. Neurological:      Mental Status: He is alert. Psychiatric:         Mood and Affect: Mood normal.         Behavior: Behavior normal.             DIAGNOSTIC RESULTS     EKG: All EKG's are interpreted by the Emergency Department Physician who either signs or Co-signs this chart in the absence of a cardiologist.    12 lead EKG shows     RADIOLOGY:   Non-plain film images such as CT, Ultrasound and MRI are read by the radiologist. Plain radiographic images are visualized and preliminarily interpreted by the emergency physician with the below findings:        Interpretation per the Radiologist below, if available at the time of this note:    CT ABDOMEN PELVIS W IV CONTRAST Additional Contrast? None   Final Result   1. No evidence for abscess. 2. Possible increased size of the dominant liver lesion though the apparent   change could be due to differences in timing of the contrast bolus. 3. Suspect developing lytic bone metastases. XR CHEST (2 VW)   Final Result   Left pleural effusion with bilateral atelectasis but no evidence for   pneumonia.                ED BEDSIDE ULTRASOUND:   Performed by ED Physician - none    LABS:  Labs Reviewed   LACTATE, SEPSIS - Abnormal; Notable for the following components:       Result Value    Lactic Acid, Sepsis 2.0 (*)     All other components within normal limits    Narrative:     Performed at:  [address], [facility] [employer]   Phone [phone]   CBC WITH AUTO DIFFERENTIAL - Abnormal; Notable for the following components:    WBC 11.8 (*)     RBC 3.75 (*)     Hemoglobin 11.7 (*)     Hematocrit 34.2 (*)     RDW 16.0 (*)     Neutrophils Absolute 10.7 (*)     Lymphocytes Absolute 0.8 (*)     All other components within normal limits    Narrative:     Performed at:  [address], [facility]   Phone [phone]   COMPREHENSIVE METABOLIC PANEL - Abnormal; Notable for the following components:    Sodium 131 (*)     CO2 19 (*)     Glucose 185 (*)     Calcium 7.8 (*)     Albumin 2.9 (*)     Albumin/Globulin Ratio 0.8 (*)     Total Bilirubin 6.6 (*)     Alkaline Phosphatase 420 (*)     ALT 43 (*)      (*)     All other components within normal limits    Narrative:     Performed at:  [place] [address] [facility]   Phone [phone]   PROCALCITONIN - Abnormal; Notable for the following components:    Procalcitonin 0.98 (*)     All other components within normal limits    Narrative:     Performed at:  [place] [address] [facility]   Phone [phone]   [address] - Abnormal; Notable for the following components:    Bilirubin, Direct 4.4 (*)     Bilirubin, Indirect 2.2 (*)     All other components within normal limits    Narrative:     Performed at:  [address], [facility]   Phone [phone]   CULTURE, BLOOD 1   CULTURE, BLOOD 2   LACTATE, SEPSIS    Narrative:     Performed at:  [facility]  [address] [place] [address], [place], [place] [employer]   Phone [phone]   LIPASE    Narrative:     Performed at:  [facility]) [facility]  [address],  [place], [place]   Phone [phone]       All other labs were within normal range or not returned as of this dictation. EMERGENCY DEPARTMENT COURSE and DIFFERENTIAL DIAGNOSIS/MDM:   Vitals:    Vitals:    08/05/21 0432 08/05/21 0502 08/05/21 0545 08/05/21 0600   BP: (!) 140/56 137/72  (!) 143/88   Pulse:    71   Resp:    16   Temp:    98.2 °F (36.8 °C)   TempSrc:    Oral   SpO2: 95% 97%  97%   Weight:   135 lb 8 oz (61.5 kg)    Height:   5' 6\" (1.676 m)        Adult male who comes in for fever. Patient's chart is reviewed he does have a history of pancreatic cancer. He was started on chemotherapy. Laboratory studies chest x-ray and a CT scan of his abdomen pelvis ordered to evaluate for signs of pneumonia, intra-abdominal abscess, and signs of sepsis. Laboratory studies show elevated lactic acid and worsening LFTs. He also has mild leukocytosis. Chest x-ray and CT abdomen shows no acute processes. The lactic acid was repeated and uneventfully without any IV fluids being given and the patient lactic acid is actually improved without any intervention. The patient has been afebrile throughout his ED stay. However when I spoke to the hospitalist on duty when we consider the clinical picture and the fact that the patient will of the biliary drain has had worsening bilirubin we decided to the patient's best interest to be admitted to the hospital.  This is discussed with the patient and his family they are agreeable with the treatment plan. CRITICAL CARE TIME   None       CONSULTS:  IP CONSULT TO HOSPITALIST  IP CONSULT TO HEM/ONC  IP CONSULT TO GI    PROCEDURES:       Procedures    FINAL IMPRESSION      1.  Fever, unspecified fever cause          DISPOSITION/PLAN   DISPOSITION Admitted 08/05/2021 04:45:18 AM      PATIENT REFERREDTO:  No follow-up provider specified.     DISCHARGEMEDICATIONS:  Current Discharge Medication List             (Please note that portions of this note were completed with a voice recognition program.  Efforts were made to edit the dictations but occasionally words are mis-transcribed.)    Kiersten Mendez MD (electronically signed)  Attending Emergency Physician        Kiersten Mendez MD  08/05/21 3652

## 2021-08-05 NOTE — PROGRESS NOTES
Comprehensive Nutrition Assessment    Type and Reason for Visit:  Initial, Positive Nutrition Screen (weight loss and decreased appetite)    Nutrition Recommendations/Plan:   1. Regular diet  2. Will monitor nutritional adequacy, nutrition-related labs, weights, BMs, and clinical progress   3. Ensure and Magic cup with meals     Nutrition Assessment:  Patient admitted with fever and chills. History of pancreatic cancer noted metastatic. GI consulted, noted will likely need ERCP as outpatient. Paient reported losing 15 lbs in the past 3 months even with eating fairly consistently. Malnutrition Assessment:  Malnutrition Status:  Mild malnutrition    Context:  Chronic Illness     Findings of the 6 clinical characteristics of malnutrition:  Energy Intake:  Mild decrease in energy intake (Comment)  Weight Loss:  7 - Greater than 7.5% over 3 months     Body Fat Loss:  Unable to assess     Muscle Mass Loss:  Unable to assess    Fluid Accumulation:  Unable to assess     Strength:  Not Performed    Estimated Daily Nutrient Needs:  Energy (kcal):  2582-2505; Weight Used for Energy Requirements:  Current (61.5 kg)     Protein (g):  80-92; Weight Used for Protein Requirements:  Current (1.3-1.5)        Fluid (ml/day):   ; Method Used for Fluid Requirements:  1 ml/kcal      Nutrition Related Findings:  Takes Creon with meals-no recent GI distress; no problems chewing or swallowing      Wounds:  None       Current Nutrition Therapies:    ADULT DIET; Regular    Anthropometric Measures:  · Height: 5' 6\" (167.6 cm)  · Current Body Weight: 135 lb 8 oz (61.5 kg)   · Ideal Body Weight: 142 lbs; % Ideal Body Weight     · BMI: 21.9  · BMI Categories: Normal Weight (BMI 18.5-24. 9)       Nutrition Diagnosis:   · Unintended weight loss related to catabolic illness as evidenced by weight loss 7.5% in 3 months    Nutrition Interventions:   Food and/or Nutrient Delivery:  Continue Current Diet, Start Oral Nutrition

## 2021-08-05 NOTE — CONSULTS
ONCOLOGY HEMATOLOGY CARE CONSULT NOTE      Requesting Physician:  Dr. Graeme Leon:  Fever        HISTORY OF PRESENT ILLNESS:      Mr. Anabell Gu  is a 76 y.o. male we are seeing in consultation for fever. This patient has metastatic pancreatic cancer. He received [place]/Abraxane on 8/02/2021. He was admitted to [facility] on 8/04/2021 with a fever to 102.5 deg F at home. Since admission Tmax has been 99.9 deg F. He has been started on Cefepime. Past Medical History:    Past Medical History:   Diagnosis Date    Arthritis     Cancer ([address] 2021    Pancreatic    GERD (gastroesophageal reflux disease)     resolved with surgery    Hiatal hernia     Hypertension     Retention of urine     with narcotics     Past Surgical History:    Past Surgical History:   Procedure Laterality Date    COLONOSCOPY  10/29/2013    ERCP N/A 7/7/2021    ERCP SPHINCTER/PAPILLOTOMY performed by Radha Cloud MD at [facility] ERCP  7/7/2021    ERCP STENT INSERTION performed by Radha Cloud MD at [facility] ERCP N/A 07/28/2021    ERCP STONE REMOVAL    ERCP N/A 7/28/2021    ERCP STONE REMOVAL performed by Radha Cloud MD at [address] Left 10/04/2017    excision left forearm mass    GASTRIC FUNDOPLICATION  feb [phone]  02/19/2015    ROBOTIC ASSISTED HIATAL HERNIA REPAIR WITH NISSEN    IR BILIARY DRAIN EXTERNAL  7/28/2021    IR BILIARY DRAIN EXTERNAL 7/28/2021 [facility]    JOINT REPLACEMENT Left 01/31/13    left total knee replacement    JOINT REPLACEMENT Right 2019    right TKR    KNEE ARTHROSCOPY      left    OTHER SURGICAL HISTORY  07/07/2021    ERCP WF W/upper EUS W/Metal biliary stent replacement W/Anes.     OTHER SURGICAL HISTORY N/A 07/28/2021    PERCUTANEOUS GALLBLADDER DRAIN PLACEMENT    PORT SURGERY N/A 8/4/2021    SURGICAL PORT PLACEMENT performed by Travon Lindquist MD at [facility] 29 N/A 7/28/2021    PERCUTANEOUS GALLBLADDER DRAIN PLACEMENT performed by Niko Alegria MD at [facility] 32 Right 10/22/2020    RIGHT TOTAL KNEE REPLACEMENT      CELAYA & NEPHEW performed by Yessi Norris MD at [address] [place] [address]  2015    bx    UPPER GASTROINTESTINAL ENDOSCOPY N/A 7/7/2021    UPPER EUS W/ANES.  performed by Prem Olivia MD at [facility]  7/7/2021    EGD BIOPSY performed by Prem Olivia MD at [facility]         Current Medications:    Current Facility-Administered Medications   Medication Dose Route Frequency Provider Last Rate Last Admin    sodium chloride flush 0.9 % injection 10 mL  10 mL Intravenous 2 times per day Eric Shoulders A Mike, DO   10 mL at 08/05/21 0805    sodium chloride flush 0.9 % injection 10 mL  10 mL Intravenous PRN Ahmad A Mike, DO   10 mL at 08/05/21 1017    0.9 % sodium chloride infusion  25 mL Intravenous PRN Eric Shoulders A Mike, DO        potassium chloride 10 mEq/100 mL IVPB (Peripheral Line)  10 mEq Intravenous PRN Hope Loss, DO        magnesium sulfate 2000 mg in 50 mL IVPB premix  2,000 mg Intravenous PRN [place] Haley Mike, DO        promethazine (PHENERGAN) tablet 12.5 mg  12.5 mg Oral Q6H PRN Ahmad A Mike, DO        Or    ondansetron (ZOFRAN) injection 4 mg  4 mg Intravenous Q6H PRN [place] Haley Miek, DO        acetaminophen (TYLENOL) tablet 650 mg  650 mg Oral Q6H PRN Ahmad A Mike, DO        Or    acetaminophen (TYLENOL) suppository 650 mg  650 mg Rectal Q6H PRN [place] Haley Mike, DO        oxyCODONE (ROXICODONE) immediate release tablet 5 mg  5 mg Oral Q4H PRN Ahmad A Mike, DO   5 mg at 08/05/21 0806    oxyCODONE (ROXICODONE) immediate release tablet 10 mg  10 mg Oral Q4H PRN Ahmad A Mike, DO        tamsulosin (FLOMAX) capsule 0.4 mg  0.4 mg Oral Daily of Exercise per Week:     Minutes of Exercise per Session:    Stress:     Feeling of Stress :    Social Connections:     Frequency of Communication with Friends and Family:     Frequency of Social Gatherings with Friends and Family:     Attends Sabianist Services:     Active Member of Clubs or Organizations:     Attends Club or Organization Meetings:     Marital Status:    Intimate Partner Violence:     Fear of Current or Ex-Partner:     Emotionally Abused:     Physically Abused:     Sexually Abused:           Family History:     Family History   Problem Relation Age of Onset    High Blood Pressure Mother     High Blood Pressure Father     Cancer Father         lung    High Blood Pressure Sister     High Blood Pressure Brother      REVIEW OF SYSTEMS:    Review of Systems    PHYSICAL EXAM:      Vitals:  /63   Pulse 87   Temp 98.2 °F (36.8 °C) (Oral)   Resp 16   Ht 5' 6\" (1.676 m)   Wt 135 lb 8 oz (61.5 kg)   SpO2 98%   BMI 21.87 kg/m²     CONSTITUTIONAL:  awake, alert, cooperative, no apparent distress, and appears stated age NAD  EYES:  pupils equal, round and reactive to light, extra ocular muscles intact,sclera clear, conjunctiva normal  NECK:  Supple, symmetrical, trachea midline, no adenopathy, thyroid symmetric, not enlarged and no tenderness, skin normal  HEMATOLOGIC/LYMPHATICS:  no cervical lymphadenopathy, nosupraclavicular lymphadenopathy, no axillary lymphadenopathy and no inguinal lymphadenopathy  LUNGS:  No increased work of breathing, good air exchange, clear to auscultation bilaterally, no crackles or wheezing  CARDIOVASCULAR:  , regular rate and rhythm, normal S1 and S2, no S3 or S4, and no murmur noted  ABDOMEN:  No scars, normal bowel sounds, soft, non-distended, non-tender, no masses palpated, no hepatosplenomegally      MUSCULOSKELETAL:  There is no redness, warmth, or swelling of the joints. Full range of motion noted.   Motor strength is 5 out of 5 all extremities bilaterally. NEUROLOGIC:  Awake, alert, oriented to name, place andtime. Cranial nerves II-XII are grossly intact. Motor is 5 out of 5 bilaterally. SKIN:  no bruising or bleeding      DATA:    PT/INR:    Recent Labs     08/04/21 2213   PROT 6.7     PTT:No results for input(s): APTT in the last 72 hours. CMP:    Lab Results   Component Value Date     08/04/2021    K 4.0 08/04/2021    K 3.9 07/29/2021    CL 99 08/04/2021    CO2 19 08/04/2021    BUN 18 08/04/2021    PROT 6.7 08/04/2021     Magnesium:    Lab Results   Component Value Date    MG 1.90 11/19/2018     Phosphorus:  No components found for: PO4  Calcium:  No components found for: CA  CBC:    Lab Results   Component Value Date    WBC 11.8 08/04/2021    RBC 3.75 08/04/2021    HGB 11.7 08/04/2021    HCT 34.2 08/04/2021    MCV 91.3 08/04/2021    RDW 16.0 08/04/2021     08/04/2021     DIFF:    Lab Results   Component Value Date    MCV 91.3 08/04/2021    RDW 16.0 08/04/2021      LDH:  @labcrnt(LDH)@  Uric Acid:  @labcrnt(URIC)@    Radiology Review:  CT CHEST (2 VW) (8/05/2021): Impression   Left pleural effusion with bilateral atelectasis but no evidence for   pneumonia.         CT ABDOMEN PELVIS W IV CONTRAST (8/05/2021): Impression   1. No evidence for abscess. 2. Possible increased size of the dominant liver lesion though the apparent   change could be due to differences in timing of the contrast bolus.    3. Suspect developing lytic bone metastases.         Problem List  Patient Active Problem List   Diagnosis    Left TKR    HTN (hypertension)    GERD (gastroesophageal reflux disease)    Bilateral high frequency sensorineural hearing loss    Cervical radiculitis    Ulnar neuropathy of left upper extremity    Left wrist pain    Mass of left wrist    Thoracic or lumbosacral neuritis or radiculitis, unspecified    Syncope and collapse    Other cervical disc degeneration at C6-C7 level    Other cervical disc degeneration at C4-C5 level    Hyperlipidemia    Arthritis of right hand    Status post total right knee replacement    S/P total knee arthroplasty, right    Lumbosacral spondylosis without myelopathy    Biliary obstruction    Pancreas cancer (HCC)    Fever and chills       IMPRESSION/RECOMMENDATIONS:  1.) Metastatic pancreatic cancer  - Started [place]/Abraxane on 8/02/2021.  - Due for cycle 1, day 8 on 8/09/2021.    2.) Obstructive jaundice  - ERCP with sphincterotomy and stent placement, 7/07/2021, with Dr. Apple Salinas. - A follow up CT on 7/22/2021 showed increased biliary dilatation.  - Another ERCP was attempted, but the stent could not be retrieved and was felt to have migrated into the cystic duct. - An 8 [place] external biliary drain was placed in the common hepatic duct on 7/28/2021. Total bili was 3.9 mg/dL at that time. - Now, the bili is 3.4 mg/dL today. 3.) Fever  - Cultures pending.  - Afebrile since admission.  - On Cefepime. If afebrile overnight and t bili is stable or improved, then could safely discharge. [place] does cause a drug-induced fever, but admission and IV antibiotics are the safest choice in a patient such as this. Thank you for asking me to see the patient.        Tanmay Prasad MD  PleaseContact Through Perfect [employer]

## 2021-08-05 NOTE — PROGRESS NOTES
Physical Therapy    Facility/Department: [facility] B3 - MED SURG  Initial Assessment and Discharge Summary    NAME: Coby Nicole  : 1945  MRN: [id number]    Date of Service: 2021    Discharge Recommendations:  Home with assist PRN   PT Equipment Recommendations  Equipment Needed: No    Assessment   Body structures, Functions, Activity limitations: Decreased functional mobility   Assessment: Pt is a 76 y.o. male who presents to [facility] with a fever. The patient has a known diagnosis of pancreatic cancer. He was started on chemotherapy on Monday. PTA, pt was IND with all functional mobility without a device. Pt currently is Mod I for all functional mobility with increased fatigue noted with activity. Pt appears close to his baseline function and has no concerns about returning home. Pt reporting he will begin to slowly increase his activity as able at home and declines home therapy. Pt has no further acute PT needs and will be discharged from caseload. If pt's status changes, please reach out. Treatment Diagnosis: Decreased functional mobility  Prognosis: Good  Decision Making: Low Complexity  PT Education: Goals;PT Role;Plan of Care  Patient Education: Pt verbalized understanding; Educated on pacing techniques and activity modification for fatigue management. REQUIRES PT FOLLOW UP: No  Activity Tolerance  Activity Tolerance: Patient Tolerated treatment well;Patient limited by fatigue  Activity Tolerance: SpO2=95%, HR=95bpm, and ZJ=425/69 after ambulation; some fatigue noted, but willing to participate       Patient Diagnosis(es): The encounter diagnosis was Fever, unspecified fever cause. has a past medical history of Arthritis, Cancer ([address] GERD (gastroesophageal reflux disease), Hiatal hernia, Hypertension, and Retention of urine. has a past surgical history that includes Knee arthroscopy; Vasectomy; Colonoscopy (10/29/2013); Upper gastrointestinal endoscopy;  Upper gastrointestinal endoscopy (); hernia repair (02/19/2015); Gastric fundoplication (feb 8824); Forearm surgery (Left, 10/04/2017); Total knee arthroplasty (Right, 10/22/2020); other surgical history (07/07/2021); ERCP (N/A, 7/7/2021); Upper gastrointestinal endoscopy (N/A, 7/7/2021); ERCP (7/7/2021); Upper gastrointestinal endoscopy (7/7/2021); joint replacement (Left, 01/31/13); joint replacement (Right, 2019); other surgical history (N/A, 07/28/2021); ERCP (N/A, 07/28/2021); Spine surgery (N/A, 7/28/2021); ERCP (N/A, 7/28/2021); IR Biliary Drain External (7/28/2021); and Port Surgery (N/A, 8/4/2021). Restrictions  Restrictions/Precautions  Restrictions/Precautions: Fall Risk  Position Activity Restriction  Other position/activity restrictions: up to chair, ambulate  Vision/Hearing  Vision: Impaired  Vision Exceptions: Wears glasses at all times  Hearing: Exceptions to [facility]  Hearing Exceptions: Hard of hearing/hearing concerns;Bilateral hearing aid     Subjective  General  Chart Reviewed: Yes  Patient assessed for rehabilitation services?: Yes  Response To Previous Treatment: Not applicable  Family / Caregiver Present: Yes (Wife and granddaughter)  Referring Practitioner: Antonina Valdez MD  Referral Date : 08/05/21  Subjective  Subjective: Pt agreeable to therapy.   Pain Screening  Patient Currently in Pain: No (No reports of pain throughout session.)  Vital Signs  Pulse: 86  Heart Rate Source: Monitor  BP: (!) 157/69  BP Location: Left upper arm  Patient Position: Sitting  Patient Currently in Pain: No (No reports of pain throughout session.)  Oxygen Therapy  SpO2: 95 %  Pulse Oximeter Device Mode: Intermittent  Pulse Oximeter Device Location: Finger  O2 Device: None (Room air)       Orientation  Orientation  Overall Orientation Status: Within Functional Limits  Social/Functional History  Social/Functional History  Lives With: Spouse  Type of Home: House  Home Layout: Two level, 1/2 bath on main level, Performs ADL's on one level, Bed/Bath

## 2021-08-05 NOTE — PROGRESS NOTES
Assessment complete. VSS afebrile reports pain in back, will medicate per MAR. NPO for Consults. Call light in reach.

## 2021-08-05 NOTE — PLAN OF CARE
Nutrition Problem #1: Unintended weight loss  Intervention: Food and/or Nutrient Delivery: Continue Current Diet, Start Oral Nutrition Supplement  Nutritional Goals: Patient will eat 50% or greater of meals and supplements without GI distress.

## 2021-08-05 NOTE — PROGRESS NOTES
Consult sent through perfect serve [email or web address]  LEONORA aware of it  Luis Barfield

## 2021-08-05 NOTE — PROGRESS NOTES
Consult called to [email or web address]  RN aware of it  Rometta Kocher

## 2021-08-05 NOTE — CONSULTS
Medication Reconciliation Completed    Spoke to patient and reviewed surescripts     Patient is currently holding prinzide   Added dose to prochlorperazine  Added dose to zofran      Added dose to creon capsules      -- Dose was changed in med rec to 2 capsules TID     -- This dose was ordered as 1 capsule TID, consider changing       No other changes made   Cj Saba, PharmD 8/5/2021 2:43 PM

## 2021-08-05 NOTE — PROGRESS NOTES
Occupational Therapy   Occupational Therapy Initial Assessment/Treatment/Discharge Summary  1x only  Date: 2021   Patient Name: Miguel Peña  MRN: [id number]     : 1945    Date of Service: 2021    Discharge Recommendations:  Home with assist PRN       Assessment   Performance deficits / Impairments: Decreased endurance  Patient admitted from home, lives with spouse and IPTA with ADLs/mobility. Patient with dx of fever and chills post port placement on 21. Today patient was supervision assist with ADLs/transfers and mobility in/out of bathroom. DAVEY noted. Cues for pursed lip breathing techniques. Patient with no acute OT needs all questions answered. Prognosis: Good  Decision Making: Low Complexity  OT Education: OT Role;Plan of Care;ADL Adaptive Strategies; Energy Conservation; Family Education  Patient Education: disease specific: Patient educated on pursed lip breathing to aid in recovery and energy conservation s/p therapeutic activity as related to patient's respiratory condition. Patient verbalized understanding of above education. No Skilled OT: No OT goals identified  REQUIRES OT FOLLOW UP: No  Activity Tolerance  Activity Tolerance: Patient Tolerated treatment well;Patient limited by fatigue  Activity Tolerance: Vitals: O2 sats 98% on RA, Bp 138/67 and HR 86 bpm after mobility out of bathroom. Safety Devices  Safety Devices in place: Yes  Type of devices: Call light within reach;Nurse notified;Gait belt;Left in bed           Patient Diagnosis(es): The encounter diagnosis was Fever, unspecified fever cause. has a past medical history of Arthritis, Cancer ([address] GERD (gastroesophageal reflux disease), Hiatal hernia, Hypertension, and Retention of urine. has a past surgical history that includes Knee arthroscopy; Vasectomy; Colonoscopy (10/29/2013); Upper gastrointestinal endoscopy; Upper gastrointestinal endoscopy (); hernia repair (2015);  Gastric fundoplication (feb 2015); Forearm surgery (Left, 10/04/2017); Total knee arthroplasty (Right, 10/22/2020); other surgical history (07/07/2021); ERCP (N/A, 7/7/2021); Upper gastrointestinal endoscopy (N/A, 7/7/2021); ERCP (7/7/2021); Upper gastrointestinal endoscopy (7/7/2021); joint replacement (Left, 01/31/13); joint replacement (Right, 2019); other surgical history (N/A, 07/28/2021); ERCP (N/A, 07/28/2021); Spine surgery (N/A, 7/28/2021); ERCP (N/A, 7/28/2021); IR Biliary Drain External (7/28/2021); and Port Surgery (N/A, 8/4/2021). Restrictions  Restrictions/Precautions  Restrictions/Precautions: Fall Risk  Position Activity Restriction  Other position/activity restrictions: up to chair, ambulate    Subjective   General  Chart Reviewed: Yes  Patient assessed for rehabilitation services?: Yes  Additional Pertinent Hx: pancreatic cancer, recent chemo on 8/2/21  Family / Caregiver Present: Yes (spouse and daughter)  Referring Practitioner: Verl Klinefelter, MD 8/5/21  Diagnosis: fever; s/p   Insertion of right internal jugular Power Portacath 8/4/21  Subjective  Subjective: Pt in bathroom upon entry pleasant and agreeable to OT evaluation.    Pain:denies     Social/Functional History  Social/Functional History  Lives With: Spouse  Type of Home: House  Home Layout: Two level, 1/2 bath on main level, Performs ADL's on one level  Home Access: Stairs to enter without rails  Entrance Stairs - Number of Steps: 3 ISAIAS  Bathroom Shower/Tub: Tub/Shower unit  Bathroom Toilet: Standard  Home Equipment: Standard walker, Cane  ADL Assistance: Independent  Homemaking Assistance: Independent  Homemaking Responsibilities: Yes  Transfer Assistance: Independent  Active : Yes  Occupation: Retired  Type of occupation: sold paint       Objective   Vision: Impaired  Vision Exceptions: Wears glasses at all times  Hearing: Within functional limits    Orientation  Overall Orientation Status: Within Functional Limits     Balance  Sitting Balance: Independent  Toilet Transfers  Toilet - Technique: Ambulating  Equipment Used: Standard toilet  Toilet Transfer: Modified independent     ADL  Grooming: Supervision  LE Dressing: Supervision  Toileting: Supervision  Tone RUE  RUE Tone: Normotonic  Tone LUE  LUE Tone: Normotonic  Coordination  Movements Are Fluid And Coordinated: Yes        Transfers  Sit to stand: Supervision  Stand to sit: Supervision     Cognition  Overall Cognitive Status: WFL     LUE AROM (degrees)  LUE AROM : WFL  RUE AROM (degrees)  RUE AROM : WFL        BUE AROM: WFL        Plan   Plan  Times per week: 1x only                 AM-PAC Score        AM-[facility] Inpatient Daily Activity Raw Score: 22 (08/05/21 1313)  AM-PAC Inpatient ADL T-Scale Score : 47.1 (08/05/21 1313)  ADL Inpatient CMS 0-100% Score: 25.8 (08/05/21 1313)  ADL Inpatient CMS G-Code Modifier : CJ (08/05/21 1313)    Goals  Short term goals  Time Frame for Short term goals: 1 session  Short term goal 1: Pt will complete toilet transfers with supervision=stg 8/5  Short term goal 2: Pt will complete LE dressing with supervisio-STG met 8/5  Patient Goals   Patient goals : \"to go home\"       Therapy Time   Individual Concurrent Group Co-treatment   Time In 1040         Time Out 1120         Minutes 40         Timed Code Treatment Minutes: 30 Minutes       GREER Bartholomew/JESUS

## 2021-08-06 NOTE — PROGRESS NOTES
ONCOLOGY HEMATOLOGY CARE PROGRESS NOTE      SUBJECTIVE:     Feels well and pain controlled. No pain at drain site. Drain with adequate bili output. ROS:     Constitutional:  No weight loss, No fever, No chills, No night sweats. Energy level good. Eyes:  No impairment or change in vision  ENT / Mouth:  No pain, abnormal ulceration, bleeding, nasal drip or change in voice or hearing  Cardiovascular:  No chest pain, palpitations, new edema, or calf discomfort  Respiratory:  No pain, hemoptysis, change to breathing  Breast:  No pain, discharge, change in appearance or texture  Gastrointestinal:  No pain, cramping, jaundice, change to eating and bowel habits  Urinary:  No pain, bleeding or change in continence  Genitalia: No pain, bleeding or discharge  Musculoskeletal:  No redness, pain, edema or weakness  Skin:  No pruritus, rash, change to nodules or lesions  Neurologic:  No discomfort, change in mental status, speech, sensory or motor activity  Psychiatric:  No change in concentration or change to affect or mood  Endocrine:  No hot flashes, increased thirst, or change to urine production  Hematologic: No petechiae, ecchymosis or bleeding  Lymphatic:  No lymphadenopathy or lymphedema  Allergy / Immunologic:  No eczema, hives, frequent or recurrent infections    OBJECTIVE        Physical    VITALS:  /72   Pulse 79   Temp 98.1 °F (36.7 °C) (Oral)   Resp 16   Ht 5' 6\" (1.676 m)   Wt 135 lb (61.2 kg)   SpO2 97%   BMI 21.79 kg/m²   TEMPERATURE:  Current - Temp: 98.1 °F (36.7 °C);  Max - Temp  Av.6 °F (37 °C)  Min: 98.1 °F (36.7 °C)  Max: 99.5 °F (37.5 °C)  PULSE OXIMETRY RANGE: SpO2  Av.4 %  Min: 95 %  Max: 98 %  24HR INTAKE/OUTPUT:      Intake/Output Summary (Last 24 hours) at 2021 0840  Last data filed at 2021 1501  Gross per 24 hour   Intake 360 ml   Output 50 ml   Net 310 ml       CONSTITUTIONAL: awake, alert, cooperative, no apparent  Lumbosacral spondylosis without myelopathy    Biliary obstruction    Pancreas cancer (HCC)    Fever and chills       ASSESSMENT AND PLAN:  1.) Metastatic pancreatic cancer  - Started [place]/Abraxane on 8/02/2021.  - Due for cycle 1, day 8 on 8/09/2021. Likely to hold at least Abraxane with elevated bili of 6.9 - defer to Dr. Ye Simms      2.) Obstructive jaundice  - ERCP with sphincterotomy and stent placement, 7/07/2021, with Dr. Ed Hines. - A follow up CT on 7/22/2021 showed increased biliary dilatation.  - Another ERCP was attempted, but the stent could not be retrieved and was felt to have migrated into the cystic duct. - An 8 [place] external biliary drain was placed in the common hepatic duct on 7/28/2021. Total bili was 3.9 mg/dL at that time. - Now, the bili is 6.9 mg/dL today.  Defer to GI team      3.) Klebsiella Bacteremia   - on Cefepime; mgmt per IM   - possible ID consult   - still with low grade temp 99.5   - NOT neutropenic          ONCOLOGIC DISPOSITION: TBD due to Klebsiella bacteremia and still elevated bili of 6.9       Eve Slaughter, CNP   [facility]   Please contact through [facility]

## 2021-08-06 NOTE — CARE COORDINATION
CASE MANAGEMENT DISCHARGE SUMMARY      Discharge to: home      Transportation:    Family/car:       Confirmed discharge plan with:     Patient: yes        RN, name:  Sonali Younger

## 2021-08-06 NOTE — DISCHARGE SUMMARY
Hospital Medicine Discharge Summary    Patient ID: Yeimy Saba      Patient's PCP: Pippa Escoto DO    Admit Date: 8/4/2021     Discharge Date:      Admitting Physician: Amita Ceron DO     Discharge Physician: Ирина Brar MD     Discharge Diagnoses: Active Hospital Problems    Diagnosis Date Noted    Fever and chills [R50.9] 08/05/2021       The patient was seen and examined on day of discharge and this discharge summary is in conjunction with any daily progress note from day of discharge. Condition at discharge - stable    Hospital Course: patient seen and evaluated on the day of discharge. Patient informed about following up with appointments. Patient verbalized understanding for follow-up appointments. The patient and / or the family were informed of the results of tests, a time was given to answer questions, a plan was proposed and they agreed with plan. Medical reconciliation performed. Patient discharged stable condition. Patient is a 49-year-old male with past medical history of pancreatic cancer, hypertension, GERD who presented to hospital after an episode of fever at home. Patient mentions he recently had surgery with a stent in bile duct that migrated into cystic duct.   He denied abdominal pain, nausea vomiting diarrhea constipation dysuria fevers or chills at time of my examination.     Assessment  Fever, unclear etiology  Hyponatremia  Lactic acidosis  Leukocytosis  History of pancreatic cancer  History of recent biliary stenting     Plan  Started on cefepime for intra-abdominal infection, able to tolerate diet, GI consulted-no plans for intervention planned at this time, ERCP as outpatient basis  Monitor LFTs  Monitor vital signs for any fevers, blood cultures negative to date  Lactic acid resolved  DVT prophylaxis-Lovenox    Plan for discharge today, cleared for discharge per GI, patient was also seen by hematology, consulted ID for positive blood culture with klebsiella for oral abx at discharge. Patient can be discharge in stable condition          Exam:     BP (!) 152/81   Pulse 80   Temp 98.4 °F (36.9 °C) (Oral)   Resp 16   Ht 5' 6\" (1.676 m)   Wt 135 lb (61.2 kg)   SpO2 97%   BMI 21.79 kg/m²     General appearance: No apparent distress  HEENT:  Conjunctivae/corneas clear. Neck: Supple, No jugular venous distention. Respiratory:  Normal respiratory effort. Clear to auscultation, bilaterally without Rales/Wheezes/Rhonchi. Cardiovascular: Regular rate and rhythm with normal S1/S2 without murmurs, rubs or gallops. Abdomen: Soft, non-tender, non-distended, normal bowel sounds. Musculoskelatal: No clubbing, cyanosis or edema bilaterally. Skin: Skin color, texture, turgor normal.   Neurologic: no focal neurologic deficits. grossly non-focal.  Psychiatric: Alert and oriented, normal mood    Consults:     IP CONSULT TO HOSPITALIST  IP CONSULT TO HEM/ONC  IP CONSULT TO GI  IP CONSULT TO PHARMACY  IP CONSULT TO INFECTIOUS DISEASES      Code Status:  Full Code    Activity: activity as tolerated    Labs:  For convenience and continuity at follow-up the following most recent labs are provided:      CBC:    Lab Results   Component Value Date    WBC 6.0 08/06/2021    HGB 11.0 08/06/2021    HCT 31.7 08/06/2021     08/06/2021       Renal:    Lab Results   Component Value Date     08/06/2021    K 3.8 08/06/2021    CL 99 08/06/2021    CO2 24 08/06/2021    BUN 17 08/06/2021    CREATININE 0.7 08/06/2021    CALCIUM 8.2 08/06/2021    PHOS 2.5 02/22/2015       Discharge Medications:     Current Discharge Medication List           Details   ondansetron (ZOFRAN) 8 MG tablet Take by mouth as needed for Nausea or Vomiting (after chemo)       prochlorperazine (COMPAZINE) 10 MG tablet 10 mg every 6 hours as needed       Magnesium Hydroxide (DULCOLAX PO) Take 1 tablet by mouth as needed       oxyCODONE (ROXICODONE) 5 MG immediate release tablet Take 5 mg by mouth every 4 hours as needed for Pain. CREON [id number] units CPEP delayed release capsule 2 capsules 3 times daily (with meals)       zolpidem (AMBIEN) 5 MG tablet Take 5 mg by mouth nightly as needed. tamsulosin (FLOMAX) 0.4 MG capsule Take 1 capsule by mouth daily For urine flow  Qty: 30 capsule, Refills: 3      sildenafil (VIAGRA) 100 MG tablet TAKE ONE TABLET BY MOUTH AS NEEDED FOR ERECTILE DYSFUNCTION **NO MORE THAN ONE TABLET IN 24 HOURS**  Qty: 6 tablet, Refills: 3      aspirin 81 MG EC tablet Take 81 mg by mouth daily      lisinopril-hydroCHLOROthiazide (PRINZIDE;ZESTORETIC) 10-12.5 MG per tablet TAKE ONE TABLET BY MOUTH DAILY  Qty: 90 tablet, Refills: 2             Time Spent on discharge is more than 30 mints in the examination, evaluation, counseling and review of medications and discharge plan. Signed:    Jacobo Guthrie MD   8/6/2021      Thank you Jimy Alatorre DO for the opportunity to be involved in this patient's care. If you have any questions or concerns please feel free to contact me at [phone].

## 2021-08-06 NOTE — PROGRESS NOTES
Progress Note  Date:2021       [id number]  Patient Name:Robert G Runner     YOB: 1945     Age:75 y.o. Subjective    Subjective:  Symptoms:  Stable. Pain:  He reports no pain. Review of Systems  Objective         Vitals Last 24 Hours:  TEMPERATURE:  Temp  Av.7 °F (37.1 °C)  Min: 98.1 °F (36.7 °C)  Max: 99.5 °F (37.5 °C)  RESPIRATIONS RANGE: Resp  Av  Min: 16  Max: 16  PULSE OXIMETRY RANGE: SpO2  Av.3 %  Min: 95 %  Max: 97 %  PULSE RANGE: Pulse  Av.5  Min: 79  Max: 95  BLOOD PRESSURE RANGE: Systolic (38KJL), UMK:468 , Min:125 , RCA:257   ; Diastolic (69YGD), JNX:24, Min:69, Max:81    I/O (24Hr): No intake or output data in the 24 hours ending 21 1518  Objective:  General Appearance: In no acute distress and not in pain. Vital signs: (most recent): Blood pressure (!) 152/81, pulse 80, temperature 98.4 °F (36.9 °C), temperature source Oral, resp. rate 16, height 5' 6\" (1.676 m), weight 135 lb (61.2 kg), SpO2 97 %. Abdomen: Abdomen is soft. Bowel sounds are normal.   There is no abdominal tenderness. Labs/Imaging/Diagnostics    Labs:  CBC:  Recent Labs     21  0612   WBC 11.8* 6.0   RBC 3.75* 3.40*   HGB 11.7* 11.0*   HCT 34.2* 31.7*   MCV 91.3 93.4   RDW 16.0* 16.0*    125*     CHEMISTRIES:  Recent Labs     21  0612   * 133*   K 4.0 3.8   CL 99 99   CO2 19* 24   BUN 18 17   CREATININE 0.8 0.7*   GLUCOSE 185* 118*     PT/INR:No results for input(s): PROTIME, INR in the last 72 hours. APTT:No results for input(s): APTT in the last 72 hours.   LIVER PROFILE:  Recent Labs     21  0612   * 104*   ALT 43* 42*   BILIDIR 4.4* 5.3*   BILITOT 6.6* 6.9*   ALKPHOS 420* 334*       Imaging Last 24 Hours:  XR CHEST (2 VW)    Result Date: 2021  EXAMINATION: TWO XRAY VIEWS OF THE CHEST 2021 1:46 am COMPARISON: 2021 HISTORY: ORDERING SYSTEM PROVIDED HISTORY: fever TECHNOLOGIST PROVIDED HISTORY: Reason for exam:->fever Reason for Exam: fever FINDINGS: Left pleural effusion is identified. There is bibasilar atelectasis. The upper lungs are clear. The heart size is normal.  There is no pneumothorax. A right chest port is now present with the catheter tip in the superior vena cava. Percutaneous biliary drainage catheter and metallic common duct stent are now identified in the right upper quadrant. Left pleural effusion with bilateral atelectasis but no evidence for pneumonia. CT CHEST WO CONTRAST    Result Date: 8/5/2021  EXAMINATION: CT OF THE CHEST WITHOUT CONTRAST 8/5/2021 1:37 pm TECHNIQUE: CT of the chest was performed without the administration of intravenous contrast. Multiplanar reformatted images are provided for review. Dose modulation, iterative reconstruction, and/or weight based adjustment of the mA/kV was utilized to reduce the radiation dose to as low as reasonably achievable. COMPARISON: 08/05/2021 and 07/22/2021 HISTORY: ORDERING SYSTEM PROVIDED HISTORY: PNA? TECHNOLOGIST PROVIDED HISTORY: Reason for exam:->PNA? Reason for Exam: sob Acuity: Acute Type of Exam: Initial Relevant Medical/Surgical History: quit smoking 1976 FINDINGS: Mediastinum: Coronary artery calcifications are a marker of atherosclerosis. There are no enlarged thoracic lymph nodes. Status post interval right port terminating in the distal SVC. No change in the moderate circumferential wall thickening of the distal esophagus likely due to esophagitis. No change in the heterogeneous thyroid gland. Lungs/pleura: The tracheobronchial tree is patent. There is no pneumothorax. There worsening trace bilateral pleural effusions with atelectasis. Mild-to-moderate emphysema involves the bilateral upper lungs. There is worsening mild-to-moderate bilateral interstitial thickening likely due to interstitial edema.   No change in the multiple solid and ground-glass subcentimeter pulmonary nodules scattered throughout the bilateral lungs concerning for metastatic disease. Upper Abdomen: No change in the ill-defined low attenuating lesion within the right hepatic dome measuring 2.3 x 3.3 cm concerning for metastatic disease, limited by the lack of intravenous contrast.  No change in the too small to characterize low attenuating lesions within the right hepatic lobe favoring benign cysts. Status post external biliary drain with improved, but residual mild intrahepatic ductal dilatation. Soft Tissues/Bones: Degenerative changes involve the thoracic spine. 1. Worsening mild-to-moderate interstitial edema and trace bilateral pleural effusions likely due to fluid overload. 2. Unchanged multiple solid and ground-glass bilateral pulmonary nodules bear attention on the next imaging cycle. 3. Unchanged right hepatic mass concerning for metastatic disease. 4. Improved with residual mild intrahepatic duct dilatation status post external biliary drain. CT ABDOMEN PELVIS W IV CONTRAST Additional Contrast? None    Result Date: 8/5/2021  EXAMINATION: CT OF THE ABDOMEN AND PELVIS WITH CONTRAST 8/5/2021 1:28 am TECHNIQUE: CT of the abdomen and pelvis was performed with the administration of intravenous contrast. Multiplanar reformatted images are provided for review. Dose modulation, iterative reconstruction, and/or weight based adjustment of the mA/kV was utilized to reduce the radiation dose to as low as reasonably achievable. COMPARISON: 07/22/2021 HISTORY: ORDERING SYSTEM PROVIDED HISTORY: fever, recent ab  PROVIDED HISTORY: Additional Contrast?->None Reason for exam:->fever, recent ab instrumentation Decision Support Exception - unselect if not a suspected or confirmed emergency medical condition->Emergency Medical Condition (MA) Reason for Exam: fever, recent ab instrumentation Acuity: Acute FINDINGS: Lower Chest: There is bibasilar scarring and atelectasis.   Tiny right and trace left pleural effusions are present. Organs: A percutaneous biliary drainage catheter is now in place. Biliary ductal dilation does persist.  Low-density liver lesions are again seen. The dominant lesion in the dome of the liver appears larger though this could be due to differences in timing of the contrast bolus. Common duct stent remains in place. There has been no significant change in the poorly defined pancreatic mass. Portal vein occlusion is again noted with collateral vessels. The spleen, adrenal glands and kidneys are unremarkable. GI/Bowel: Small bowel caliber is normal.  The appendix is normal.  The colon is unremarkable. Pelvis: The bladder is grossly negative. Peritoneum/Retroperitoneum: There is trace free fluid in the pelvis. No adenopathy or mesenteric stranding. Aortic caliber is normal. Bones/Soft Tissues: There appear to be subtle lytic lesions in the spine and pelvis, most pronounced in the L1 vertebral body. 1. No evidence for abscess. 2. Possible increased size of the dominant liver lesion though the apparent change could be due to differences in timing of the contrast bolus. 3. Suspect developing lytic bone metastases. IR BILIARY DILITATION    Result Date: 8/5/2021  PROCEDURE: IR FLUOROSCOPIC GUIDED BILIARY TUBE INJECTION 8/5/2021 HISTORY: ORDERING SYSTEM PROVIDED HISTORY: Evaluate biliary drain TECHNOLOGIST PROVIDED HISTORY: Reason for exam:->Evaluate biliary drain Reason for Exam: biliary cath check Acuity: Acute Type of Exam: Initial TECHNIQUE: STERILE ACCESS, FLUOROSCOPIC GUIDANCE. CONTRAST: 10 CC NONVASCULAR SEDATION: NONE. COMPARISON: Abdomen pelvic CT, same day prior. FLUOROSCOPY DOSE AND TYPE OR TIME AND EXPOSURES: 38 seconds, 5 digital images. DESCRIPTION OF PROCEDURE: Informed consent was obtained after a detailed explanation of the procedure including risks, benefits, and alternatives. Universal protocol was observed.  The existing external biliary drainage catheter was connected to a gravity drainage bag with pale brown yellow bile fluid with some internal debris. Patient states that it has been freely flowing with gradual communication emptied on a daily basis. The external catheter was disconnected to the drainage bag and a syringe with 50 50 contrast dilution was connected. Initial imaging shows a metal mesh biliary stent. The proximal and is partially into the cystic duct and the distal in extends into the duodenum. Gentle contrast injection shows the catheter to be widely patent. There is free flow into the hilar portion of the main intrahepatic duct. Contrast predominately flows distally and does extend through the channel of the previously placed metal mesh stent. Contrast empties into the duodenum primarily. There is some contrast retrograde flow into the intrahepatic ducts without dilation. There is no filling defect. The contrast syringe was disconnected and gentle flush and irrigation was performed with 10 cc sterile saline. The catheter was then reconnected to the gravity drainage bag. Instructions were given on patient irrigation technique and approximately 5 separate syringes of sterile saline was also given to the patient following instructions for catheter irrigation. FINDINGS: Free-flowing patent pigtail catheter in good position. No dilation or obstruction, no filling defect. There is flow distally through the metal mesh biliary stent into the duodenum. This did not cause any significant discomfort or nausea. There was no need for pressurization to allow for forward flow. Widely patent external biliary drainage catheter. There is also internal flow bile flow through the duct into the duodenum.      Assessment//Plan           Hospital Problems         Last Modified POA    Fever and chills 8/5/2021 Yes        Assessment & Plan  76year old male with metastatic pancreas cancer s/p bile duct metal stent by Dr Vergara Spotted in 7/2021 that migrated up into the cystic duct and he was unable to remove, so an external biliary drain was placed. His bili is 6.9, but he has no GI Sx. Dr Lo Kennedy believes repeat ERCP will not help because the stent is \"embedded in the cystic duct\". IR decided the drain is patent. Unfortunately, hus rising bilirubin may be due to worsening liver metastasis, not amenable to endoscopic Rx.     Plan:   1. OK to D/C home today and F/U w Oncology  2.  Will follow     Audra Miles MD       (O) [phone]    Electronically signed by Audra Miles MD on 8/6/21 at 3:18 PM EDT

## 2021-08-06 NOTE — CONSULTS
Infectious Diseases   Consult Note      Reason for Consult:  Bacteremia   Requesting Physician:  Dr. Maddy Sharpe      Date of Admission: 8/4/2021  Subjective:   CHIEF COMPLAINT:  None given       HPI:    Rex Strange is a 77yoM with history of metastatic pancreatic cancer, HTN, GERD    On 7/7/21 - ERCP with sphincterotomy and stent placement  Admitted [facility] with obstructive jaundice. ERCP 7/28/21 - previous stent had migrated into the cystic duct and could not be retrieved. percutaneous biliary drain was placed      Started on chemo - gem/abraxane on 8/2/21    On 8/4/21, he had port placed. Admitted 8/4/21 with fever  It has been low grade since admission. Currently AF. He is on RA     One of 2 sets of BC collected on admission is now positive with K oxytoca, OLENA pending  WBC is wnl   LFTs are stable except for very slight increase in TB today.    He denies nausea, diarrhea       Current abx:  Cefepime 2g q12       Past Surgical History:       Diagnosis Date    Arthritis     Cancer ([facility] [address] 2021    Pancreatic    GERD (gastroesophageal reflux disease)     resolved with surgery    Hiatal hernia     Hypertension     Retention of urine     with narcotics         Procedure Laterality Date    COLONOSCOPY  10/29/2013    ERCP N/A 7/7/2021    ERCP SPHINCTER/PAPILLOTOMY performed by Perez Blackburn MD at [address] [facility] ERCP  7/7/2021    ERCP STENT INSERTION performed by Perez Blackburn MD at [facility] ERCP N/A 07/28/2021    ERCP STONE REMOVAL    ERCP N/A 7/28/2021    ERCP STONE REMOVAL performed by Perez Blackburn MD at [address] [facility] Left 10/04/2017    excision left forearm mass    GASTRIC FUNDOPLICATION  feb 0180    HERNIA REPAIR  02/19/2015    ROBOTIC ASSISTED HIATAL HERNIA REPAIR WITH NISSEN    IR BILIARY DRAIN EXTERNAL  7/28/2021    IR BILIARY DRAIN EXTERNAL 7/28/2021 [address] SPECIAL PROCEDURES    JOINT REPLACEMENT Left 01/31/13    left total knee replacement    JOINT REPLACEMENT Right 2019    right TKR    KNEE ARTHROSCOPY      left    OTHER SURGICAL HISTORY  07/07/2021    ERCP WF W/upper EUS W/Metal biliary stent replacement W/Anes.  OTHER SURGICAL HISTORY N/A 07/28/2021    PERCUTANEOUS GALLBLADDER DRAIN PLACEMENT    PORT SURGERY N/A 8/4/2021    SURGICAL PORT PLACEMENT performed by Jerardo Arevalo MD at [facility] N/A 7/28/2021    PERCUTANEOUS GALLBLADDER DRAIN PLACEMENT performed by Maryjane Buerger, MD at [address] [facility] [address] Right 10/22/2020    RIGHT TOTAL KNEE REPLACEMENT      CELAYA & NEPHEW performed by Batsheva Kumar MD at [address] [facility] ENDOSCOPY  2015    bx    UPPER GASTROINTESTINAL ENDOSCOPY N/A 7/7/2021    UPPER EUS W/ANES. performed by Sukhi Kay MD at [address] [facility]  7/7/2021    EGD BIOPSY performed by Sukhi Kay MD at [address] [facility] [facility]         Social History:    TOBACCO:   reports that he quit smoking about 44 years ago. He has a 8.50 pack-year smoking history. He has never used smokeless tobacco.  ETOH:   reports no history of alcohol use. There is no history of illicit drug use or other significant epidemiologic exposures.       Family History:       Problem Relation Age of Onset    High Blood Pressure Mother     High Blood Pressure Father     Cancer Father         lung    High Blood Pressure Sister     High Blood Pressure Brother        Current Medications:    Current Facility-Administered Medications: [START ON 8/7/2021] enoxaparin (LOVENOX) injection 40 mg, 40 mg, Subcutaneous, Daily  sodium chloride flush 0.9 % injection 10 mL, 10 mL, Intravenous, 2 times per day  sodium chloride flush 0.9 % injection 10 mL, 10 mL, Intravenous, PRN  0.9 % sodium chloride infusion, 25 mL, Intravenous, PRN  potassium chloride 10 mEq/100 mL IVPB (Peripheral Line), 10 mEq, Intravenous, PRN  magnesium sulfate 2000 mg in 50 mL IVPB premix, 2,000 mg, Intravenous, PRN  promethazine (PHENERGAN) tablet 12.5 mg, 12.5 mg, Oral, Q6H PRN **OR** ondansetron (ZOFRAN) injection 4 mg, 4 mg, Intravenous, Q6H PRN  acetaminophen (TYLENOL) tablet 650 mg, 650 mg, Oral, Q6H PRN **OR** acetaminophen (TYLENOL) suppository 650 mg, 650 mg, Rectal, Q6H PRN  oxyCODONE (ROXICODONE) immediate release tablet 5 mg, 5 mg, Oral, Q4H PRN  tamsulosin (FLOMAX) capsule 0.4 mg, 0.4 mg, Oral, Daily  zolpidem (AMBIEN) tablet 5 mg, 5 mg, Oral, Nightly PRN  lipase-protease-amylase (CREON) delayed release capsule 36,000 Units, 36,000 Units, Oral, TID WC  aspirin EC tablet 81 mg, 81 mg, Oral, Daily  cefepime (MAXIPIME) 2000 mg IVPB minibag, 2,000 mg, Intravenous, Q12H  bisacodyl (DULCOLAX) EC tablet 5 mg, 5 mg, Oral, Daily PRN      Allergies   Allergen Reactions    Ativan [Lorazepam]     Clarithromycin     Gabapentin     Pravastatin     Promethazine-Codeine     Vicodin [Hydrocodone-Acetaminophen]      Causes prostate swelling- needs to take terazosin if takes vicodin    Zegerid [Omeprazole]         REVIEW OF SYSTEMS:    CONSTITUTIONAL:   Per HPI    EYES:  negative for blurred vision, eye discharge, visual disturbance and icterus  HEENT:  negative for hearing loss, tinnitus, ear drainage, sinus pressure, nasal congestion, epistaxis and snoring  RESPIRATORY:  No cough, shortness of breath, hemoptysis  CARDIOVASCULAR:  negative for chest pain, palpitations, exertional chest pressure/discomfort, edema, syncope  GASTROINTESTINAL:   Per HPI   GENITOURINARY:  negative for frequency, dysuria, urinary incontinence, decreased urine volume, and hematuria  HEMATOLOGIC/LYMPHATIC:  negative for easy bruising, bleeding and lymphadenopathy  ALLERGIC/IMMUNOLOGIC:  negative for recurrent infections, angioedema, anaphylaxis and drug reactions  ENDOCRINE:  negative for weight changes and diabetic symptoms 17   CREATININE 0.8 0.7*   CALCIUM 7.8* 8.2*   GLUCOSE 185* 118*        Cultures:   8/5 BC #1 NGTD   BC #2 Klebsiella sp, OLENA pending       Radiology Review:  All pertinent images / reports were reviewed as a part of this visit. CT chest 8/5/21  Impression   1. Worsening mild-to-moderate interstitial edema and trace bilateral pleural   effusions likely due to fluid overload. 2. Unchanged multiple solid and ground-glass bilateral pulmonary nodules bear   attention on the next imaging cycle. 3. Unchanged right hepatic mass concerning for metastatic disease. 4. Improved with residual mild intrahepatic duct dilatation status post   external biliary drain. CT a/p 8/5/21  Impression   1. No evidence for abscess. 2. Possible increased size of the dominant liver lesion though the apparent   change could be due to differences in timing of the contrast bolus. 3. Suspect developing lytic bone metastases. Assessment:     Patient Active Problem List   Diagnosis    Left TKR    HTN (hypertension)    GERD (gastroesophageal reflux disease)    Bilateral high frequency sensorineural hearing loss    Cervical radiculitis    Ulnar neuropathy of left upper extremity    Left wrist pain    Mass of left wrist    Thoracic or lumbosacral neuritis or radiculitis, unspecified    Syncope and collapse    Other cervical disc degeneration at C6-C7 level    Other cervical disc degeneration at C4-C5 level    Hyperlipidemia    Arthritis of right hand    Status post total right knee replacement    S/P total knee arthroplasty, right    Lumbosacral spondylosis without myelopathy    Biliary obstruction    Pancreas cancer ([address]    Fever and chills       Recent diagnosis pancreatic cancer   Obstructive jaundice s/p stent placement 7/6/21.   Stent failed/migradet and could not be retrived  S/p perc biliary drain 7/28/21    First round of chemo was given 8/2/21  Port was placed 8/4/21    Admitted 8/5/21 with fever  Persistent jaundice   Klebsiella bacteremia in 1/2 sets collected on admission, presumably of GI origin       Recs:  -continue rocephin for now  -repeat BC  -would keep him inpatient until sensitivities are back to ensure he is optimally treated. Should have that back tomorrow   -if the organism is sensitive, could change to po cipro and continue for another 10d after DC  -would defer chemotherapy until he has completed abx and fully recovered         Internal Administration   First Dose COVID-19, J&J, PF, 0.5 mL  03/04/2021   Second Dose           Last COVID Lab SARS-CoV-2, ADA (no units)   Date Value   10/16/2020 NOT DETECTED          D/w patient, he was agreeable   D/w RN       Maximiliano Jane M.D. Thank you for the opportunity to participate in the care of your patient.     Please do not hesitate to contact me:   [phone] office

## 2021-08-07 NOTE — PROGRESS NOTES
Hospitalist Progress Note      PCP: Leesa Alonso DO    Date of Admission: 8/4/2021    Chief Complaint:  fever    Hospital Course: \"  Patient is a 44-year-old male with past medical history of pancreatic cancer, hypertension, GERD who presented to hospital after an episode of fever at home. Patient mentions he recently had surgery with a stent in bile duct that migrated into cystic duct. He denied abdominal pain, nausea vomiting diarrhea constipation dysuria fevers or chills at time of my examination. \"     The pt was admitted for further treatment. Subjective: EMR and notes reviewed pt seen and examined. C/o pain time for meds, fatigue       Medications:  Reviewed    Infusion Medications    sodium chloride       Scheduled Medications    tbo-filgrastim  300 mcg Subcutaneous QPM    enoxaparin  40 mg Subcutaneous Daily    sodium chloride flush  10 mL Intravenous 2 times per day    tamsulosin  0.4 mg Oral Daily    lipase-protease-amylase  36,000 Units Oral TID WC    aspirin  81 mg Oral Daily    cefepime  2,000 mg Intravenous Q12H     PRN Meds: sodium chloride flush, sodium chloride, potassium chloride, magnesium sulfate, promethazine **OR** ondansetron, acetaminophen **OR** acetaminophen, zolpidem, bisacodyl    No intake or output data in the 24 hours ending 08/07/21 1056    Physical Exam Performed:    BP (!) 160/87   Pulse 76   Temp 98.4 °F (36.9 °C) (Oral)   Resp 16   Ht 5' 6\" (1.676 m)   Wt 136 lb 1.6 oz (61.7 kg)   SpO2 98%   BMI 21.97 kg/m²     General appearance:Frail and stoic   HEENT: Pupils equal, round, and reactive to light. Conjunctivae/corneas clear. Neck: Supple, with full range of motion. No jugular venous distention. Trachea midline. Respiratory:  Normal respiratory effort. Clear to auscultation, bilaterally without Rales/Wheezes/Rhonchi. Cardiovascular: Regular rate and rhythm with normal S1/S2 without murmurs, rubs or gallops.   Abdomen: Soft, tender, non-distended with external biliary drain. CT ABDOMEN PELVIS W IV CONTRAST Additional Contrast? None   Final Result   1. No evidence for abscess. 2. Possible increased size of the dominant liver lesion though the apparent   change could be due to differences in timing of the contrast bolus. 3. Suspect developing lytic bone metastases. XR CHEST (2 VW)   Final Result   Left pleural effusion with bilateral atelectasis but no evidence for   pneumonia. Assessment/Plan:    Active Hospital Problems    Diagnosis     Fever and chills [R50.9]      75 yo male with pancreatic ca presented with fever and chills     Fever and chills s/s to Klebsiella bacteremia   - POA with fever of 99.9   - ID consulted   - PT was started on IV abx Cefepime, ID rec for PO Cipro  - monitor fever curve closely as pt is Neutropenic. Hem/Onc following, discussed with Dr Nohemy Kraft, would like to monitor him closely, since he is neutropenic. WBC 1.7 Granix started today ( 8/7/21)   - monitor labs and fever curve     Metastatic Pancreatic Ca with obstructive jaundice  - followed by Hem/ Onc and GI   - ERCP with sphincterotomy and stent placement, 7/07/2021, with Dr. Vishnu Douglas. - A follow up CT on 7/22/2021 showed increased biliary dilatation.  - Another ERCP was attempted, but the stent could not be retrieved and was felt to have migrated into the cystic duct. - An 8 Japanese external biliary drain was placed in the common hepatic duct on 7/28/2021.  Total bili was 3.9 mg/dL at that time. - follow GI labs      Chemo-induced neutropenia/thrombocytopenia  - 8/7 WBC 1.8 K/mcL, ANC 1.3 K/mcL. -  Filgrastim per hem/onc    HTN: controlled currently NOT on any meds     [place] calorie malnutrition in the setting of above   - supplements     PT OT eval for home care needs, Case Mgt consulted to assist with DC needs     DVT Prophylaxis: thrombocytopenia   Diet: ADULT DIET;  Regular  Adult Oral Nutrition Supplement; Frozen Oral Supplement  Adult Oral Nutrition Supplement; Standard High Calorie/High Protein Oral Supplement  Code Status: Full Code    PT/OT Eval Status: ordered     Dispo - pending  clinical suspect 2-3 days more     SHEREEN Knowles - CNP

## 2021-08-07 NOTE — PROGRESS NOTES
ONCOLOGY HEMATOLOGY CARE PROGRESS NOTE      SUBJECTIVE:     Afebrile and on room air. Drain with adequate bile output. ROS:     Constitutional:  No weight loss, No fever, No chills, No night sweats. Energy level good. Eyes:  No impairment or change in vision  ENT / Mouth:  No pain, abnormal ulceration, bleeding, nasal drip or change in voice or hearing  Cardiovascular:  No chest pain, palpitations, new edema, or calf discomfort  Respiratory:  No pain, hemoptysis, change to breathing  Breast:  No pain, discharge, change in appearance or texture  Gastrointestinal:  No pain, cramping, jaundice, change to eating and bowel habits  Urinary:  No pain, bleeding or change in continence  Genitalia: No pain, bleeding or discharge  Musculoskeletal:  No redness, pain, edema or weakness  Skin:  No pruritus, rash, change to nodules or lesions  Neurologic:  No discomfort, change in mental status, speech, sensory or motor activity  Psychiatric:  No change in concentration or change to affect or mood  Endocrine:  No hot flashes, increased thirst, or change to urine production  Hematologic: No petechiae, ecchymosis or bleeding  Lymphatic:  No lymphadenopathy or lymphedema  Allergy / Immunologic:  No eczema, hives, frequent or recurrent infections    OBJECTIVE        Physical    VITALS:  BP (!) 160/87   Pulse 76   Temp 98.4 °F (36.9 °C) (Oral)   Resp 16   Ht 5' 6\" (1.676 m)   Wt 136 lb 1.6 oz (61.7 kg)   SpO2 98%   BMI 21.97 kg/m²   TEMPERATURE:  Current - Temp: 98.4 °F (36.9 °C);  Max - Temp  Av.7 °F (37.1 °C)  Min: 98.3 °F (36.8 °C)  Max: 99.8 °F (37.7 °C)  PULSE OXIMETRY RANGE: SpO2  Av.6 %  Min: 97 %  Max: 98 %  24HR INTAKE/OUTPUT:    No intake or output data in the 24 hours ending 21 1039    CONSTITUTIONAL: awake, alert, cooperative, no apparent distress   EYES: pupils equal, round and reactive to light, sclera clear and conjunctiva normal  ENT: Normocephalic, without obvious abnormality, atraumatic  NECK: supple, symmetrical, no jugular venous distension and no carotid bruits   HEMATOLOGIC/LYMPHATIC: no cervical, supraclavicular or axillary lymphadenopathy   LUNGS: no increased work of breathing and clear to auscultation   CARDIOVASCULAR: regular rate and rhythm, normal S1 and S2, no murmur noted  ABDOMEN: normal bowel sounds x 4, soft, non-distended, non-tender, no masses palpated, no hepatosplenomgaly   MUSCULOSKELETAL: full range of motion noted, tone is normal  NEUROLOGIC: awake, alert, oriented to name, place and time. Motor skills grossly intact.    SKIN: + jaundice   EXTREMITIES: no LE edema       Data      Recent Labs     08/04/21 2213 08/06/21  0612 08/07/21  0542   WBC 11.8* 6.0 1.8*   HGB 11.7* 11.0* 10.0*   HCT 34.2* 31.7* 28.4*    125* 82*   MCV 91.3 93.4 93.0        Recent Labs     08/04/21 2213 08/06/21  0612 08/07/21  0542   * 133* 132*   K 4.0 3.8 4.0   CL 99 99 100   CO2 19* 24 23   BUN 18 17 17   CREATININE 0.8 0.7* 0.6*     Recent Labs     08/04/21 2213 08/06/21  0612 08/07/21  0542   * 104* 107*   ALT 43* 42* 44*   BILIDIR 4.4* 5.3*  --    BILITOT 6.6* 6.9* 6.1*   ALKPHOS 420* 334* 295*       Magnesium:    Lab Results   Component Value Date    MG 1.90 11/19/2018    MG 2.10 02/22/2015    MG 2.10 02/21/2015         Problem List  Patient Active Problem List   Diagnosis    Left TKR    HTN (hypertension)    GERD (gastroesophageal reflux disease)    Bilateral high frequency sensorineural hearing loss    Cervical radiculitis    Ulnar neuropathy of left upper extremity    Left wrist pain    Mass of left wrist    Thoracic or lumbosacral neuritis or radiculitis, unspecified    Syncope and collapse    Other cervical disc degeneration at C6-C7 level    Other cervical disc degeneration at C4-C5 level    Hyperlipidemia    Arthritis of right hand    Status post total right knee replacement    S/P total knee arthroplasty, right    Lumbosacral spondylosis without myelopathy    Biliary obstruction    Pancreas cancer (HCC)    Fever and chills       ASSESSMENT AND PLAN:  1.) Metastatic pancreatic cancer  - Started [place]/Abraxane on 8/02/2021.  - Due for cycle 1, day 8 on 8/09/2021. Likely to hold at least Abraxane with elevated bili of 6.9 - defer to Dr. Brad Ang      2.) Obstructive jaundice  - ERCP with sphincterotomy and stent placement, 7/07/2021, with Dr. Agee. - A follow up CT on 7/22/2021 showed increased biliary dilatation.  - Another ERCP was attempted, but the stent could not be retrieved and was felt to have migrated into the cystic duct. - An 8 Slov[place] external biliary drain was placed in the common hepatic duct on 7/28/2021. Total bili was 3.9 mg/dL at that time. - Now, the bili is 6.9 mg/dL today. Defer to GI team      3.) Klebsiella Bacteremia   - on Cefepime; mgmt per IM   - possible ID consult   - still with low grade temp 99.5   - NOT neutropenic      4.) Chemo-induced neutropenia/thrombocytopenia. - WBC 1.8 K/mcL, ANC 1.3 K/mcL. - Will start Filgrastim now, as he is expected to continue to drop and is bacteremic. ONCOLOGIC DISPOSITION: TBD due to Klebsiella bacteremia, counts dropping. Needs to be through his trough before his discharge.       Noe Koehler MD  [facility]   Please contact through [facility]

## 2021-08-08 NOTE — PROGRESS NOTES
Progress Note  Date:2021       [facility]:[id number]  Patient Name:Robert G Runner     YOB: 1945     Age:75 y.o. Subjective    Subjective:  Symptoms:  Stable. Pain:  He reports no pain. Review of Systems  Objective         Vitals Last 24 Hours:  TEMPERATURE:  Temp  Av.7 °F (37.1 °C)  Min: 98.3 °F (36.8 °C)  Max: 99.2 °F (37.3 °C)  RESPIRATIONS RANGE: Resp  Av  Min: 15  Max: 17  PULSE OXIMETRY RANGE: SpO2  Av.3 %  Min: 95 %  Max: 100 %  PULSE RANGE: Pulse  Av.5  Min: 75  Max: 86  BLOOD PRESSURE RANGE: Systolic (15JLD), SYE:091 , Min:147 , HTH:680   ; Diastolic (56HQD), AWL:07, Min:72, Max:76    I/O (24Hr): Intake/Output Summary (Last 24 hours) at 2021 1519  Last data filed at 2021 1021  Gross per 24 hour   Intake 360 ml   Output 125 ml   Net 235 ml     Objective:  General Appearance: In no acute distress and not in pain. Vital signs: (most recent): Blood pressure (!) 147/74, pulse 82, temperature 98.3 °F (36.8 °C), temperature source Oral, resp. rate 16, height 5' 6\" (1.676 m), weight 135 lb 3.2 oz (61.3 kg), SpO2 97 %. Abdomen: Abdomen is soft. Bowel sounds are normal.   There is no abdominal tenderness. Labs/Imaging/Diagnostics    Labs:  CBC:  Recent Labs     21  0655   WBC 6.0 1.8* 4.5   RBC 3.40* 3.05* 3.28*   HGB 11.0* 10.0* 10.5*   HCT 31.7* 28.4* 30.7*   MCV 93.4 93.0 93.6   RDW 16.0* 16.1* 15.9*   * 82* 74*     CHEMISTRIES:  Recent Labs     21  0542 21  0655   * 132* 134*   K 3.8 4.0 4.6   CL 99 100 101   CO2 24 23 24   BUN 17 17 16   CREATININE 0.7* 0.6* 0.6*   GLUCOSE 118* 161* 136*     PT/INR:No results for input(s): PROTIME, INR in the last 72 hours. APTT:No results for input(s): APTT in the last 72 hours.   LIVER PROFILE:  Recent Labs     21  0612 21  0542   * 107*   ALT 42* 44*   BILIDIR 5.3*  --    BILITOT 6.9* 6.1*   ALKPHOS 334* 295*       Imaging Last 24 Hours:  No results found. Assessment//Plan           Hospital Problems         Last Modified POA    Fever and chills 8/5/2021 Yes        Assessment & Plan  76year old male with metastatic pancreas cancer s/p bile duct metal stent by Dr Cathy Mendenhall in 7/2021 that migrated up into the cystic duct and he was unable to remove, so an external biliary drain was placed. His bili is 6.9, but he has no GI Sx. Dr Cathy Mendenhall believes repeat ERCP will not help because the stent is \"embedded in the cystic duct\". IR decided the drain is patent. Unfortunately, hus rising bilirubin may be due to worsening liver metastasis, not amenable to endoscopic Rx.     Plan:   1. Supportive care  2. On Abx's for Klebsiella  3. Awaiting to get through the Chemo trough before he gets discharged  4.  Will follow     MD Petr Escobar Prior) [phone]    Electronically signed by Andrea Vasquez MD on 8/8/21 at 3:17 PM EDT

## 2021-08-08 NOTE — PROGRESS NOTES
Progress Note  Date:2021       JZ:[id number]  Patient Name:Robert G Runner     YOB: 1945     Age:75 y.o. Subjective    Subjective:  Symptoms:  Stable. Pain:  He reports no pain. Review of Systems  Objective         Vitals Last 24 Hours:  TEMPERATURE:  Temp  Av.7 °F (37.1 °C)  Min: 98.3 °F (36.8 °C)  Max: 99.2 °F (37.3 °C)  RESPIRATIONS RANGE: Resp  Av  Min: 15  Max: 17  PULSE OXIMETRY RANGE: SpO2  Av.3 %  Min: 95 %  Max: 100 %  PULSE RANGE: Pulse  Av.5  Min: 75  Max: 86  BLOOD PRESSURE RANGE: Systolic (45KBK), DHE:025 , Min:147 , YFV:521   ; Diastolic (69XDN), ZDW:18, Min:72, Max:76    I/O (24Hr): Intake/Output Summary (Last 24 hours) at 2021 1517  Last data filed at 2021 1021  Gross per 24 hour   Intake 360 ml   Output 125 ml   Net 235 ml     Objective:  General Appearance: In no acute distress and not in pain. Vital signs: (most recent): Blood pressure (!) 147/74, pulse 82, temperature 98.3 °F (36.8 °C), temperature source Oral, resp. rate 16, height 5' 6\" (1.676 m), weight 135 lb 3.2 oz (61.3 kg), SpO2 97 %. Abdomen: Abdomen is soft. Bowel sounds are normal.   There is no abdominal tenderness. Labs/Imaging/Diagnostics    Labs:  CBC:  Recent Labs     21  0542 21  0655   WBC 6.0 1.8* 4.5   RBC 3.40* 3.05* 3.28*   HGB 11.0* 10.0* 10.5*   HCT 31.7* 28.4* 30.7*   MCV 93.4 93.0 93.6   RDW 16.0* 16.1* 15.9*   * 82* 74*     CHEMISTRIES:  Recent Labs     21  0612 21  0542 21  0655   * 132* 134*   K 3.8 4.0 4.6   CL 99 100 101   CO2 24 23 24   BUN 17 17 16   CREATININE 0.7* 0.6* 0.6*   GLUCOSE 118* 161* 136*     PT/INR:No results for input(s): PROTIME, INR in the last 72 hours. APTT:No results for input(s): APTT in the last 72 hours.   LIVER PROFILE:  Recent Labs     21  0612 21  0542   * 107*   ALT 42* 44*   BILIDIR 5.3*  --    BILITOT 6.9* 6.1*   ALKPHOS 334* 295*       Imaging Last 24 Hours:  No results found. Assessment//Plan           Hospital Problems         Last Modified POA    Fever and chills 8/5/2021 Yes        Assessment & Plan  76year old male with metastatic pancreas cancer s/p bile duct metal stent by Dr Hector Diaz in 7/2021 that migrated up into the cystic duct and he was unable to remove, so an external biliary drain was placed. His bili is 6.9, but he has no GI Sx. Dr Hector Diaz believes repeat ERCP will not help because the stent is \"embedded in the cystic duct\". IR decided the drain is patent. Unfortunately, hus rising bilirubin may be due to worsening liver metastasis, not amenable to endoscopic Rx.     Plan:   1. Supportive care  2. On Abx's for Klebsiella  3.  Will follow     MD Tariq Bender) [phone]    Electronically signed by Arabella Bueno MD on 8/8/21 at 3:17 PM EDT

## 2021-08-08 NOTE — PROGRESS NOTES
ONCOLOGY HEMATOLOGY CARE PROGRESS NOTE      SUBJECTIVE:     Afebrile and on room air. Drain with adequate bile output. ROS:     Constitutional:  No weight loss, No fever, No chills, No night sweats. Energy level good. Eyes:  No impairment or change in vision  ENT / Mouth:  No pain, abnormal ulceration, bleeding, nasal drip or change in voice or hearing  Cardiovascular:  No chest pain, palpitations, new edema, or calf discomfort  Respiratory:  No pain, hemoptysis, change to breathing  Breast:  No pain, discharge, change in appearance or texture  Gastrointestinal:  No pain, cramping, jaundice, change to eating and bowel habits  Urinary:  No pain, bleeding or change in continence  Genitalia: No pain, bleeding or discharge  Musculoskeletal:  No redness, pain, edema or weakness  Skin:  No pruritus, rash, change to nodules or lesions  Neurologic:  No discomfort, change in mental status, speech, sensory or motor activity  Psychiatric:  No change in concentration or change to affect or mood  Endocrine:  No hot flashes, increased thirst, or change to urine production  Hematologic: No petechiae, ecchymosis or bleeding  Lymphatic:  No lymphadenopathy or lymphedema  Allergy / Immunologic:  No eczema, hives, frequent or recurrent infections    OBJECTIVE        Physical    VITALS:  BP (!) 147/74   Pulse 82   Temp 98.3 °F (36.8 °C) (Oral)   Resp 16   Ht 5' 6\" (1.676 m)   Wt 135 lb 3.2 oz (61.3 kg)   SpO2 97%   BMI 21.82 kg/m²   TEMPERATURE:  Current - Temp: 98.3 °F (36.8 °C);  Max - Temp  Av.6 °F (37 °C)  Min: 98 °F (36.7 °C)  Max: 99.2 °F (37.3 °C)  PULSE OXIMETRY RANGE: SpO2  Av.6 %  Min: 95 %  Max: 100 %  24HR INTAKE/OUTPUT:      Intake/Output Summary (Last 24 hours) at 2021 1000  Last data filed at 2021 0659  Gross per 24 hour   Intake 120 ml   Output 125 ml   Net -5 ml       CONSTITUTIONAL: awake, alert, cooperative, no apparent distress   EYES: pupils equal, round and reactive to light, sclera clear and conjunctiva normal  ENT: Normocephalic, without obvious abnormality, atraumatic  NECK: supple, symmetrical, no jugular venous distension and no carotid bruits   HEMATOLOGIC/LYMPHATIC: no cervical, supraclavicular or axillary lymphadenopathy   LUNGS: no increased work of breathing and clear to auscultation   CARDIOVASCULAR: regular rate and rhythm, normal S1 and S2, no murmur noted  ABDOMEN: normal bowel sounds x 4, soft, non-distended, non-tender, no masses palpated, no hepatosplenomgaly   MUSCULOSKELETAL: full range of motion noted, tone is normal  NEUROLOGIC: awake, alert, oriented to name, place and time. Motor skills grossly intact.    SKIN: + jaundice   EXTREMITIES: no LE edema       Data      Recent Labs     08/06/21  0612 08/07/21  0542 08/08/21  0655   WBC 6.0 1.8* 4.5   HGB 11.0* 10.0* 10.5*   HCT 31.7* 28.4* 30.7*   * 82* 74*   MCV 93.4 93.0 93.6        Recent Labs     08/06/21  0612 08/07/21  0542 08/08/21  0655   * 132* 134*   K 3.8 4.0 4.6   CL 99 100 101   CO2 24 23 24   BUN 17 17 16   CREATININE 0.7* 0.6* 0.6*     Recent Labs     08/06/21  0612 08/07/21  0542   * 107*   ALT 42* 44*   BILIDIR 5.3*  --    BILITOT 6.9* 6.1*   ALKPHOS 334* 295*       Magnesium:    Lab Results   Component Value Date    MG 1.90 11/19/2018    MG 2.10 02/22/2015    MG 2.10 02/21/2015         Problem List  Patient Active Problem List   Diagnosis    Left TKR    HTN (hypertension)    GERD (gastroesophageal reflux disease)    Bilateral high frequency sensorineural hearing loss    Cervical radiculitis    Ulnar neuropathy of left upper extremity    Left wrist pain    Mass of left wrist    Thoracic or lumbosacral neuritis or radiculitis, unspecified    Syncope and collapse    Other cervical disc degeneration at C6-C7 level    Other cervical disc degeneration at C4-C5 level    Hyperlipidemia    Arthritis of right hand    Status post total right knee replacement    S/P total knee arthroplasty, right    Lumbosacral spondylosis without myelopathy    Biliary obstruction    Pancreas cancer (HCC)    Fever and chills       ASSESSMENT AND PLAN:  1.) Metastatic pancreatic cancer  - Started [place]/Abraxane on 8/02/2021.  - Due for cycle 1, day 8 on 8/09/2021. Likely to hold at least Abraxane with elevated bili of 6.9 - defer to Dr. Sandi Mccormick      2.) Obstructive jaundice  - ERCP with sphincterotomy and stent placement, 7/07/2021, with Dr. Gunner Dietz. - A follow up CT on 7/22/2021 showed increased biliary dilatation.  - Another ERCP was attempted, but the stent could not be retrieved and was felt to have migrated into the cystic duct. - An 8 [place] external biliary drain was placed in the common hepatic duct on 7/28/2021. Total bili was 3.9 mg/dL at that time. - Now, the t bili is 6.0 mg/dL. ERCP is not felt to be helpful due to the stent potentially being lodged in the cystic duct and the biliary drain was evaluated in IR and felt to be functional.       3.) Klebsiella Bacteremia   - on Cefepime; mgmt per IM   - possible ID consult   - still with low grade temp 99.5   - NOT neutropenic      4.) Chemo-induced neutropenia/thrombocytopenia.  - Continue Filgrastim. ONCOLOGIC DISPOSITION: TBD due to Klebsiella bacteremia, counts dropping. Needs to be through his trough before his discharge. I do not believe we are through the trough today (8/08/2021).       Les Maynard MD  [facility]   Please contact through [facility]

## 2021-08-08 NOTE — PLAN OF CARE
Problem: SAFETY  Goal: Free from accidental physical injury  Outcome: Ongoing     Problem: DAILY CARE  Goal: Daily care needs are met  Outcome: Ongoing     Problem: SKIN INTEGRITY  Goal: Skin integrity is maintained or improved  Outcome: Ongoing     Problem: KNOWLEDGE DEFICIT  Goal: Patient/S.O. demonstrates understanding of disease process, treatment plan, medications, and discharge instructions.   Outcome: Ongoing

## 2021-08-08 NOTE — PROGRESS NOTES
Hospitalist Progress Note      PCP: Radha Brasher DO    Date of Admission: 8/4/2021    Chief Complaint:  fever    Hospital Course: \"  Patient is a 41-year-old male with past medical history of pancreatic cancer, hypertension, GERD who presented to hospital after an episode of fever at home. Patient mentions he recently had surgery with a stent in bile duct that migrated into cystic duct. He denied abdominal pain, nausea vomiting diarrhea constipation dysuria fevers or chills at time of my examination. \"     The pt was admitted for further treatment. Subjective: EMR and notes reviewed pt seen and examined. Medications:  Reviewed    Infusion Medications    sodium chloride       Scheduled Medications    tbo-filgrastim  300 mcg Subcutaneous QPM    sodium chloride flush  10 mL Intravenous 2 times per day    tamsulosin  0.4 mg Oral Daily    lipase-protease-amylase  36,000 Units Oral TID WC    aspirin  81 mg Oral Daily    cefepime  2,000 mg Intravenous Q12H     PRN Meds: oxyCODONE, oxyCODONE, sodium chloride flush, sodium chloride, potassium chloride, magnesium sulfate, promethazine **OR** ondansetron, acetaminophen **OR** acetaminophen, zolpidem, bisacodyl      Intake/Output Summary (Last 24 hours) at 8/8/2021 1054  Last data filed at 8/8/2021 1021  Gross per 24 hour   Intake 360 ml   Output 125 ml   Net 235 ml       Physical Exam Performed:    BP (!) 147/74   Pulse 82   Temp 98.3 °F (36.8 °C) (Oral)   Resp 16   Ht 5' 6\" (1.676 m)   Wt 135 lb 3.2 oz (61.3 kg)   SpO2 97%   BMI 21.82 kg/m²     General appearance:Frail and stoic   HEENT: Pupils equal, round, and reactive to light. Conjunctivae/corneas clear. Neck: Supple, with full range of motion. No jugular venous distention. Trachea midline. Respiratory:  Normal respiratory effort. Clear to auscultation, bilaterally without Rales/Wheezes/Rhonchi.   Cardiovascular: Regular rate and rhythm with normal S1/S2 without murmurs, rubs or gallops. Abdomen: Soft, tender, non-distended with normal bowel sounds. Musculoskeletal: No clubbing, cyanosis or edema bilaterally. Full range of motion without deformity. Skin: Skin color, texture, turgor normal.  No rashes or lesions. Neurologic:  Neurovascularly intact without any focal sensory/motor deficits. Cranial nerves: II-XII intact, grossly non-focal.  Psychiatric: Alert and oriented, thought content appropriate, normal insight  Capillary Refill: Brisk,3 seconds, normal   Peripheral Pulses: +2 palpable, equal bilaterally       Labs:   Recent Labs     08/06/21 0612 08/07/21  0542 08/08/21  0655   WBC 6.0 1.8* 4.5   HGB 11.0* 10.0* 10.5*   HCT 31.7* 28.4* 30.7*   * 82* 74*     Recent Labs     08/06/21 0612 08/07/21  0542 08/08/21  0655   * 132* 134*   K 3.8 4.0 4.6   CL 99 100 101   CO2 24 23 24   BUN 17 17 16   CREATININE 0.7* 0.6* 0.6*   CALCIUM 8.2* 7.9* 8.0*     Recent Labs     08/06/21 0612 08/07/21  0542   * 107*   ALT 42* 44*   BILIDIR 5.3*  --    BILITOT 6.9* 6.1*   ALKPHOS 334* 295*     No results for input(s): INR in the last 72 hours. No results for input(s): Saida Lowers in the last 72 hours. Urinalysis:      Lab Results   Component Value Date    NITRU Negative 10/07/2020    BLOODU Negative 10/07/2020    SPECGRAV 1.015 10/07/2020    GLUCOSEU Negative 10/07/2020       Radiology:  IR BILIARY DILITATION   Final Result   Widely patent external biliary drainage catheter. There is also internal   flow bile flow through the duct into the duodenum. CT CHEST WO CONTRAST   Final Result   1. Worsening mild-to-moderate interstitial edema and trace bilateral pleural   effusions likely due to fluid overload. 2. Unchanged multiple solid and ground-glass bilateral pulmonary nodules bear   attention on the next imaging cycle. 3. Unchanged right hepatic mass concerning for metastatic disease.    4. Improved with residual mild intrahepatic duct dilatation status Nutrition Supplement; Frozen Oral Supplement  Adult Oral Nutrition Supplement; Standard High Calorie/High Protein Oral Supplement  Code Status: Full Code    PT/OT Eval Status: ordered     Dispo - pending  clinical suspect 2-3 days more     SHEREEN Doe - CNP

## 2021-08-09 PROBLEM — R78.81 BACTEREMIA: Status: ACTIVE | Noted: 2021-01-01

## 2021-08-09 NOTE — CARE COORDINATION
Per discussion with nursing at 1100 huddle, pt disposition pending sign off per hem/onc team regarding labs. Pt from home and denies needs at d/c.

## 2021-08-09 NOTE — PROGRESS NOTES
Hospitalist Progress Note      PCP: Bertha Mohan DO    Date of Admission: 8/4/2021    Chief Complaint:  fever      Subjective:  He feels well in general.  No subjective fevers or chills. Mostly he has no pain, but when he breathes deeply or coughs his belly is a little sore. Medications:  Reviewed    Infusion Medications    sodium chloride       Scheduled Medications    ciprofloxacin  500 mg Oral 2 times per day    tbo-filgrastim  300 mcg Subcutaneous QPM    sodium chloride flush  10 mL Intravenous 2 times per day    tamsulosin  0.4 mg Oral Daily    lipase-protease-amylase  36,000 Units Oral TID WC    aspirin  81 mg Oral Daily     PRN Meds: oxyCODONE, oxyCODONE, sodium chloride flush, sodium chloride, potassium chloride, magnesium sulfate, promethazine **OR** ondansetron, acetaminophen **OR** acetaminophen, zolpidem, bisacodyl      Intake/Output Summary (Last 24 hours) at 8/9/2021 1247  Last data filed at 8/9/2021 0015  Gross per 24 hour   Intake 0 ml   Output 50 ml   Net -50 ml       Physical Exam Performed:    /74   Pulse 80   Temp 98.1 °F (36.7 °C) (Oral)   Resp 16   Ht 5' 6\" (1.676 m)   Wt 134 lb 9.6 oz (61.1 kg)   SpO2 98%   BMI 21.73 kg/m²     General appearance: Frail and stoic   HEENT: Pupils equal, round, and reactive to light. Conjunctivae/corneas clear. Neck: Supple, with full range of motion. No jugular venous distention. Trachea midline. Respiratory:  Normal respiratory effort. Clear to auscultation, bilaterally without Rales/Wheezes/Rhonchi. Cardiovascular: Regular rate and rhythm with normal S1/S2 without murmurs, rubs or gallops. Abdomen: Soft, mild RUQ tenderness, non-distended with normal bowel sounds. Musculoskeletal: No clubbing, cyanosis or edema bilaterally. Full range of motion without deformity. Skin: Skin color, texture, turgor normal.  No rashes or lesions. Neurologic:  Neurovascularly intact without any focal sensory/motor deficits.  Cranial nerves: II-XII intact, grossly non-focal.  Psychiatric: Alert and oriented, thought content appropriate, normal insight  Capillary Refill: Brisk,3 seconds, normal   Peripheral Pulses: +2 palpable, equal bilaterally       Labs:   Recent Labs     08/07/21  0542 08/08/21  0655 08/09/21  0645   WBC 1.8* 4.5 4.3   HGB 10.0* 10.5* 9.7*   HCT 28.4* 30.7* 27.5*   PLT 82* 74* 61*     Recent Labs     08/07/21  0542 08/08/21  0655 08/09/21  0645   * 134* 130*   K 4.0 4.6 4.0    101 99   CO2 23 24 23   BUN 17 16 15   CREATININE 0.6* 0.6* 0.6*   CALCIUM 7.9* 8.0* 8.1*     Recent Labs     08/07/21  0542   *   ALT 44*   BILITOT 6.1*   ALKPHOS 295*     No results for input(s): INR in the last 72 hours. No results for input(s): Pashto Nickerson in the last 72 hours. Urinalysis:      Lab Results   Component Value Date    NITRU Negative 10/07/2020    BLOODU Negative 10/07/2020    SPECGRAV 1.015 10/07/2020    GLUCOSEU Negative 10/07/2020       Radiology:  IR BILIARY DILITATION   Final Result   Widely patent external biliary drainage catheter. There is also internal   flow bile flow through the duct into the duodenum. CT CHEST WO CONTRAST   Final Result   1. Worsening mild-to-moderate interstitial edema and trace bilateral pleural   effusions likely due to fluid overload. 2. Unchanged multiple solid and ground-glass bilateral pulmonary nodules bear   attention on the next imaging cycle. 3. Unchanged right hepatic mass concerning for metastatic disease. 4. Improved with residual mild intrahepatic duct dilatation status post   external biliary drain. CT ABDOMEN PELVIS W IV CONTRAST Additional Contrast? None   Final Result   1. No evidence for abscess. 2. Possible increased size of the dominant liver lesion though the apparent   change could be due to differences in timing of the contrast bolus. 3. Suspect developing lytic bone metastases.          XR CHEST (2 VW)   Final Result   Left pleural effusion with bilateral atelectasis but no evidence for   pneumonia. Assessment/Plan:    Active Hospital Problems    Diagnosis     Bacteremia [R78.81]     Fever and chills [R50.9]          \"Patient is a 59-year-old male with past medical history of pancreatic cancer, hypertension, GERD who presented to hospital after an episode of fever at home. Patient mentions he recently had surgery with a stent in bile duct that migrated into cystic duct. He denied abdominal pain, nausea vomiting diarrhea constipation dysuria fevers or chills at time of my examination. \"       Klebsiella bacteremia, presumably due to cholangitis. Present on admission.  - empiric cefepime initially, then continued with PO cipro. Will continue for 10 days after discharge per ID. Metastatic pancreatic cancer complicated by obstruction of the biliary tree. - ERCP with sphincterotomy and stent placement, 7/07/2021, with Dr. Felicita Poole. - A follow up CT on 7/22/2021 showed increased biliary dilatation.  - Another ERCP was attempted, but the stent could not be retrieved and was felt to have migrated into the cystic duct. - An 8 [place] external biliary drain was placed in the common hepatic duct on 7/28/2021.  - continue present management. Will need close f/u with GI. Appreciate consultation while here.      Chemo-induced neutropenia/thrombocytopenia  -  Filgrastim per hem/onc    Moderate protein-calorie malnutrition in the setting of above. Supplements. DVT Prophylaxis: SCDs  Diet: ADULT DIET; Regular  Adult Oral Nutrition Supplement; Frozen Oral Supplement  Adult Oral Nutrition Supplement; Standard High Calorie/High Protein Oral Supplement  Code Status: Full Code    PT/OT Eval Status: not indicated    Dispo - ready to discharge when oncology is satisfied with his CBC parameters. Will evaluate for discharge daily. He lives at home.        Sagar Puga MD

## 2021-08-09 NOTE — PLAN OF CARE
Problem: Pain:  Description: Pain management should include both nonpharmacologic and pharmacologic interventions.   Goal: Pain level will decrease  Description: Pain level will decrease  Outcome: Ongoing  Goal: Control of chronic pain  Description: Control of chronic pain  Outcome: Ongoing     Problem: Skin Integrity:  Goal: Status of oral mucous membranes will improve  Description: Status of oral mucous membranes will improve  Outcome: Ongoing     Problem: SAFETY  Goal: Free from accidental physical injury  Outcome: Ongoing     Problem: DAILY CARE  Goal: Daily care needs are met  Outcome: Ongoing     Problem: SKIN INTEGRITY  Goal: Skin integrity is maintained or improved  Outcome: Ongoing

## 2021-08-09 NOTE — PROGRESS NOTES
ONCOLOGY HEMATOLOGY CARE PROGRESS NOTE      SUBJECTIVE:     Feels better, slept better. Family at bedside. He would like to leave today. ROS:     Constitutional:  No weight loss, No fever, No chills, No night sweats. Energy level good. Eyes:  No impairment or change in vision  ENT / Mouth:  No pain, abnormal ulceration, bleeding, nasal drip or change in voice or hearing  Cardiovascular:  No chest pain, palpitations, new edema, or calf discomfort  Respiratory:  No pain, hemoptysis, change to breathing  Breast:  No pain, discharge, change in appearance or texture  Gastrointestinal:  No pain, cramping, jaundice, change to eating and bowel habits  Urinary:  No pain, bleeding or change in continence  Genitalia: No pain, bleeding or discharge  Musculoskeletal:  No redness, pain, edema or weakness  Skin:  No pruritus, rash, change to nodules or lesions  Neurologic:  No discomfort, change in mental status, speech, sensory or motor activity  Psychiatric:  No change in concentration or change to affect or mood  Endocrine:  No hot flashes, increased thirst, or change to urine production  Hematologic: No petechiae, ecchymosis or bleeding  Lymphatic:  No lymphadenopathy or lymphedema  Allergy / Immunologic:  No eczema, hives, frequent or recurrent infections    OBJECTIVE        Physical    VITALS:  /74   Pulse 80   Temp 98.1 °F (36.7 °C) (Oral)   Resp 16   Ht 5' 6\" (1.676 m)   Wt 134 lb 9.6 oz (61.1 kg)   SpO2 98%   BMI 21.73 kg/m²   TEMPERATURE:  Current - Temp: 98.1 °F (36.7 °C);  Max - Temp  Av.3 °F (36.8 °C)  Min: 98 °F (36.7 °C)  Max: 98.7 °F (37.1 °C)  PULSE OXIMETRY RANGE: SpO2  Av.4 %  Min: 94 %  Max: 98 %  24HR INTAKE/OUTPUT:      Intake/Output Summary (Last 24 hours) at 2021 0984  Last data filed at 2021 0015  Gross per 24 hour   Intake 240 ml   Output 50 ml   Net 190 ml       CONSTITUTIONAL: awake, alert, cooperative, no apparent distress EYES: pupils equal, round and reactive to light, sclera clear and conjunctiva normal  ENT: Normocephalic, without obvious abnormality, atraumatic  NECK: supple, symmetrical, no jugular venous distension and no carotid bruits   HEMATOLOGIC/LYMPHATIC: no cervical, supraclavicular or axillary lymphadenopathy   LUNGS: no increased work of breathing and clear to auscultation   CARDIOVASCULAR: regular rate and rhythm, normal S1 and S2, no murmur noted  ABDOMEN: normal bowel sounds x 4, soft, non-distended, non-tender, no masses palpated, no hepatosplenomgaly   MUSCULOSKELETAL: full range of motion noted, tone is normal  NEUROLOGIC: awake, alert, oriented to name, place and time. Motor skills grossly intact.    SKIN: + jaundice   EXTREMITIES: no LE edema       Data      Recent Labs     08/07/21  0542 08/08/21  0655 08/09/21  0645   WBC 1.8* 4.5 4.3   HGB 10.0* 10.5* 9.7*   HCT 28.4* 30.7* 27.5*   PLT 82* 74* 61*   MCV 93.0 93.6 90.8        Recent Labs     08/07/21  0542 08/08/21  0655 08/09/21  0645   * 134* 130*   K 4.0 4.6 4.0    101 99   CO2 23 24 23   BUN 17 16 15   CREATININE 0.6* 0.6* 0.6*     Recent Labs     08/07/21  0542   *   ALT 44*   BILITOT 6.1*   ALKPHOS 295*       Magnesium:    Lab Results   Component Value Date    MG 1.90 11/19/2018    MG 2.10 02/22/2015    MG 2.10 02/21/2015         Problem List  Patient Active Problem List   Diagnosis    Left TKR    HTN (hypertension)    GERD (gastroesophageal reflux disease)    Bilateral high frequency sensorineural hearing loss    Cervical radiculitis    Ulnar neuropathy of left upper extremity    Left wrist pain    Mass of left wrist    Thoracic or lumbosacral neuritis or radiculitis, unspecified    Syncope and collapse    Other cervical disc degeneration at C6-C7 level    Other cervical disc degeneration at C4-C5 level    Hyperlipidemia    Arthritis of right hand    Status post total right knee replacement    S/P total knee arthroplasty, right    Lumbosacral spondylosis without myelopathy    Biliary obstruction    Pancreas cancer (HCC)    Fever and chills       ASSESSMENT AND PLAN:  1.) Metastatic pancreatic cancer  - Started [place]/Abraxane on 8/02/2021.  - Due for cycle 1, day 8 on 8/09/2021. Likely to hold at least Abraxane with elevated bili of 6.9 - defer to Dr. Justen Geronimo      2.) Obstructive jaundice  - ERCP with sphincterotomy and stent placement, 7/07/2021, with Dr. Deepa White. - A follow up CT on 7/22/2021 showed increased biliary dilatation.  - Another ERCP was attempted, but the stent could not be retrieved and was felt to have migrated into the cystic duct. - An 8 [place] external biliary drain was placed in the common hepatic duct on 7/28/2021. Total bili was 3.9 mg/dL at that time. - Now, the t bili is 6.0 mg/dL. ERCP is not felt to be helpful due to the stent potentially being lodged in the cystic duct and the biliary drain was evaluated in IR and felt to be functional.       3.) Klebsiella Bacteremia   - on Cefepime; mgmt per IM   - possible ID consult   - still with low grade temp 99.5   - NOT neutropenic      4.) Chemo-induced neutropenia/thrombocytopenia.  - Continue Filgrastim. ONCOLOGIC DISPOSITION: TBD due to Klebsiella bacteremia, counts dropping. Needs to be through his trough before his discharge. Cont granix; check liver function in AM. Discussed with Dr. Justen Geronimo.  Family/patient and RN aware of need to stay in hospital.     Shiv Murguia, [facility]   [facility]   Please contact through [facility]

## 2021-08-10 NOTE — PROGRESS NOTES
Hospitalist Progress Note      PCP: Radha Brasher DO    Date of Admission: 8/4/2021    Chief Complaint:  fever      Subjective:  He feels well in general.  No subjective fevers or chills. Some mild abdominal pain persists. It appears that neutrophils and plts are both rising compared to 8/7. He has mounted an appropriate bandemia response. Defer timing of discharge to oncology. Patient asking about how to flush the biliary drain. It doesn't look like there were any documented instructions for this from his IR physician at [facility]. Maybe GI can give some instructions? Medications:  Reviewed    Infusion Medications    sodium chloride       Scheduled Medications    ciprofloxacin  500 mg Oral 2 times per day    tbo-filgrastim  300 mcg Subcutaneous QPM    sodium chloride flush  10 mL Intravenous 2 times per day    tamsulosin  0.4 mg Oral Daily    lipase-protease-amylase  36,000 Units Oral TID WC    aspirin  81 mg Oral Daily     PRN Meds: oxyCODONE, oxyCODONE, sodium chloride flush, sodium chloride, potassium chloride, magnesium sulfate, promethazine **OR** ondansetron, acetaminophen **OR** acetaminophen, zolpidem, bisacodyl      Intake/Output Summary (Last 24 hours) at 8/10/2021 1029  Last data filed at 8/10/2021 0951  Gross per 24 hour   Intake 360 ml   Output 50 ml   Net 310 ml       Physical Exam Performed:    BP (!) 154/78   Pulse 88   Temp 98.1 °F (36.7 °C) (Oral)   Resp 16   Ht 5' 6\" (1.676 m)   Wt 134 lb 9.6 oz (61.1 kg)   SpO2 95%   BMI 21.73 kg/m²     General appearance: Frail and stoic   HEENT: Pupils equal, round, and reactive to light. Conjunctivae/corneas clear. Neck: Supple, with full range of motion. No jugular venous distention. Trachea midline. Respiratory:  Normal respiratory effort. Clear to auscultation, bilaterally without Rales/Wheezes/Rhonchi. Cardiovascular: Regular rate and rhythm with normal S1/S2 without murmurs, rubs or gallops.   Abdomen: Soft, mild RUQ tenderness, non-distended with normal bowel sounds. Musculoskeletal: No clubbing, cyanosis or edema bilaterally. Full range of motion without deformity. Skin: Skin color, texture, turgor normal.  No rashes or lesions. Neurologic:  Neurovascularly intact without any focal sensory/motor deficits. Cranial nerves: II-XII intact, grossly non-focal.  Psychiatric: Alert and oriented, thought content appropriate, normal insight  Capillary Refill: Brisk,3 seconds, normal   Peripheral Pulses: +2 palpable, equal bilaterally       Labs:   Recent Labs     08/08/21  0655 08/09/21  0645 08/10/21  0518   WBC 4.5 4.3 5.8   HGB 10.5* 9.7* 10.0*   HCT 30.7* 27.5* 28.4*   PLT 74* 61* 65*     Recent Labs     08/08/21 0655 08/09/21 0645 08/10/21  0518   * 130* 131*   K 4.6 4.0 3.7    99 101   CO2 24 23 21   BUN 16 15 16   CREATININE 0.6* 0.6* 0.6*   CALCIUM 8.0* 8.1* 7.9*     Recent Labs     08/10/21  0518   *   ALT 50*   BILIDIR 5.2*   BILITOT 6.8*   ALKPHOS 286*     No results for input(s): INR in the last 72 hours. No results for input(s): Les Jeremy in the last 72 hours. Urinalysis:      Lab Results   Component Value Date    NITRU Negative 10/07/2020    BLOODU Negative 10/07/2020    SPECGRAV 1.015 10/07/2020    GLUCOSEU Negative 10/07/2020       Radiology:  IR BILIARY DILITATION   Final Result   Widely patent external biliary drainage catheter. There is also internal   flow bile flow through the duct into the duodenum. CT CHEST WO CONTRAST   Final Result   1. Worsening mild-to-moderate interstitial edema and trace bilateral pleural   effusions likely due to fluid overload. 2. Unchanged multiple solid and ground-glass bilateral pulmonary nodules bear   attention on the next imaging cycle. 3. Unchanged right hepatic mass concerning for metastatic disease. 4. Improved with residual mild intrahepatic duct dilatation status post   external biliary drain.          CT ABDOMEN PELVIS W IV CONTRAST Additional Contrast? None   Final Result   1. No evidence for abscess. 2. Possible increased size of the dominant liver lesion though the apparent   change could be due to differences in timing of the contrast bolus. 3. Suspect developing lytic bone metastases. XR CHEST (2 VW)   Final Result   Left pleural effusion with bilateral atelectasis but no evidence for   pneumonia. Assessment/Plan:    Active Hospital Problems    Diagnosis     Bacteremia [R78.81]     Fever and chills [R50.9]          \"Patient is a 27-year-old male with past medical history of pancreatic cancer, hypertension, GERD who presented to hospital after an episode of fever at home. Patient mentions he recently had surgery with a stent in bile duct that migrated into cystic duct. He denied abdominal pain, nausea vomiting diarrhea constipation dysuria fevers or chills at time of my examination. \"       Klebsiella bacteremia, presumably due to cholangitis. Present on admission.  - empiric cefepime initially, then continued with PO cipro. The plan was to continue for 10 days after discharge per ID, but then the patient developed a rash on the same day as starting cipro. Changed to bactrim PO and will continue for 10 days post discharge. Metastatic pancreatic cancer complicated by obstruction of the biliary tree. - ERCP with sphincterotomy and stent placement, 7/07/2021, with Dr. Sangita Betancourt. - A follow up CT on 7/22/2021 showed increased biliary dilatation.  - Another ERCP was attempted, but the stent could not be retrieved and was felt to have migrated into the cystic duct. - An 8 [place] external biliary drain was placed in the common hepatic duct on 7/28/2021.  - continue present management. Will need close f/u with GI. Appreciate consultation while here.      Chemo-induced neutropenia/thrombocytopenia  -  Filgrastim per hem/onc    Moderate protein-calorie malnutrition in the setting of above. Supplements. DVT Prophylaxis: SCDs  Diet: ADULT DIET; Regular  Adult Oral Nutrition Supplement; Frozen Oral Supplement  Adult Oral Nutrition Supplement; Standard High Calorie/High Protein Oral Supplement  Code Status: Full Code    PT/OT Eval Status: not indicated    Dispo - ready to discharge when oncology is satisfied with his CBC parameters. Will evaluate for discharge daily. He lives at home.        Sagar Puga MD

## 2021-08-10 NOTE — PROGRESS NOTES
RN paged MD    Patient reports that the rash on his inner thighs and ventral forearms are now itching. I am not sure how it looked yesterday but the rashes are rather large, red and warm to the touch. He states he forgot to inform you while at the bedside. Thank you!

## 2021-08-10 NOTE — PROGRESS NOTES
Progress Note  Date:8/10/2021       Room:[facility]0366-  Patient Name:Robert G Runner     YOB: 1945     Age:75 y.o. Subjective    Subjective:  Symptoms:  Stable. Pain:  He reports no pain. Review of Systems  Objective         Vitals Last 24 Hours:  TEMPERATURE:  Temp  Av.4 °F (36.9 °C)  Min: 98.1 °F (36.7 °C)  Max: 99 °F (37.2 °C)  RESPIRATIONS RANGE: Resp  Av  Min: 16  Max: 16  PULSE OXIMETRY RANGE: SpO2  Av.5 %  Min: 92 %  Max: 96 %  PULSE RANGE: Pulse  Av.5  Min: 88  Max: 94  BLOOD PRESSURE RANGE: Systolic (54FLN), LKU:435 , Min:137 , CARMEN:696   ; Diastolic (22CXD), HGD:56, Min:73, Max:86    I/O (24Hr): Intake/Output Summary (Last 24 hours) at 8/10/2021 1908  Last data filed at 8/10/2021 0951  Gross per 24 hour   Intake 360 ml   Output 50 ml   Net 310 ml     Objective:  General Appearance:  Not in pain and in no acute distress. Vital signs: (most recent): Blood pressure (!) 150/86, pulse 93, temperature 98.2 °F (36.8 °C), temperature source Oral, resp. rate 16, height 5' 6\" (1.676 m), weight 134 lb 9.6 oz (61.1 kg), SpO2 92 %. Abdomen: Abdomen is soft. Bowel sounds are normal.   There is no abdominal tenderness. Labs/Imaging/Diagnostics    Labs:  CBC:  Recent Labs     21  0621  0645 08/10/21  0518   WBC 4.5 4.3 5.8   RBC 3.28* 3.03* 3.08*   HGB 10.5* 9.7* 10.0*   HCT 30.7* 27.5* 28.4*   MCV 93.6 90.8 92.3   RDW 15.9* 15.9* 16.0*   PLT 74* 61* 65*     CHEMISTRIES:  Recent Labs     21  0621  0645 08/10/21  0518   * 130* 131*   K 4.6 4.0 3.7    99 101   CO2 24 23 21   BUN 16 15 16   CREATININE 0.6* 0.6* 0.6*   GLUCOSE 136* 114* 152*     PT/INR:No results for input(s): PROTIME, INR in the last 72 hours. APTT:No results for input(s): APTT in the last 72 hours.   LIVER PROFILE:  Recent Labs     08/10/21  0518   *   ALT 50*   BILIDIR 5.2*   BILITOT 6.8*   ALKPHOS 286*       Imaging Last 24 Hours:  No results found.  Assessment//Plan           Hospital Problems         Last Modified POA    Fever and chills 8/5/2021 Yes    Bacteremia 8/9/2021 Yes        Assessment & Plan  76year old male with metastatic pancreas cancer s/p bile duct metal stent by Dr Izzy De Oliveira in 7/2021 that migrated up into the cystic duct and he was unable to remove, so an external biliary drain was placed. His bili is 6.9, but he has no GI Sx. Dr Izzy De Oliveira believes repeat ERCP will not help because the stent is \"embedded in the cystic duct\". IR decided the drain is patent. Unfortunately, hus rising bilirubin may be due to worsening liver metastasis, not amenable to endoscopic Rx.     Plan:   1. Supportive care  2. On Abx's for Klebsiella  3. Awaiting to get through the Chemo trough before he gets discharged  4.  Will follow  Trino Gomez MD       Ike Samples) [phone]    Electronically signed by Miguel A Tobin MD on 8/10/21 at 7:08 PM EDT

## 2021-08-10 NOTE — PROGRESS NOTES
distress   EYES: pupils equal, round and reactive to light, sclera clear and conjunctiva normal  ENT: Normocephalic, without obvious abnormality, atraumatic  NECK: supple, symmetrical, no jugular venous distension and no carotid bruits   HEMATOLOGIC/LYMPHATIC: no cervical, supraclavicular or axillary lymphadenopathy   LUNGS: no increased work of breathing and clear to auscultation   CARDIOVASCULAR: regular rate and rhythm, normal S1 and S2, no murmur noted  ABDOMEN: normal bowel sounds x 4, soft, non-distended, non-tender, no masses palpated, no hepatosplenomgaly   MUSCULOSKELETAL: full range of motion noted, tone is normal  NEUROLOGIC: awake, alert, oriented to name, place and time. Motor skills grossly intact.    SKIN: + jaundice   EXTREMITIES: no LE edema       Data      Recent Labs     08/08/21 0655 08/09/21 0645 08/10/21  0518   WBC 4.5 4.3 5.8   HGB 10.5* 9.7* 10.0*   HCT 30.7* 27.5* 28.4*   PLT 74* 61* 65*   MCV 93.6 90.8 92.3        Recent Labs     08/08/21  0655 08/09/21  0645 08/10/21  0518   * 130* 131*   K 4.6 4.0 3.7    99 101   CO2 24 23 21   BUN 16 15 16   CREATININE 0.6* 0.6* 0.6*     Recent Labs     08/10/21  0518   *   ALT 50*   BILIDIR 5.2*   BILITOT 6.8*   ALKPHOS 286*       Magnesium:    Lab Results   Component Value Date    MG 1.90 11/19/2018    MG 2.10 02/22/2015    MG 2.10 02/21/2015         Problem List  Patient Active Problem List   Diagnosis    Left TKR    HTN (hypertension)    GERD (gastroesophageal reflux disease)    Bilateral high frequency sensorineural hearing loss    Cervical radiculitis    Ulnar neuropathy of left upper extremity    Left wrist pain    Mass of left wrist    Thoracic or lumbosacral neuritis or radiculitis, unspecified    Syncope and collapse    Other cervical disc degeneration at C6-C7 level    Other cervical disc degeneration at C4-C5 level    Hyperlipidemia    Arthritis of right hand    Status post total right knee replacement    S/P total knee arthroplasty, right    Lumbosacral spondylosis without myelopathy    Biliary obstruction    Pancreas cancer (HCC)    Fever and chills    Bacteremia       ASSESSMENT AND PLAN:  1.) Metastatic pancreatic cancer  - Started [place]/Abraxane on 8/02/2021.  - Due for cycle 1, day 8 on 8/09/2021. Likely to hold at least Abraxane with elevated bili of 6.8 - defer to Dr. Adán Meza ; cont to hold chemo until counts stable and infection cleared.      2.) Obstructive jaundice  - ERCP with sphincterotomy and stent placement, 7/07/2021, with Dr. Becca Montez. - A follow up CT on 7/22/2021 showed increased biliary dilatation.  - Another ERCP was attempted, but the stent could not be retrieved and was felt to have migrated into the cystic duct. - An 8 [place] external biliary drain was placed in the common hepatic duct on 7/28/2021. Total bili was 3.9 mg/dL at that time. - Now, the t bili is 6.8 mg/dL. ERCP is not felt to be helpful due to the stent potentially being lodged in the cystic duct and the biliary drain was evaluated in IR and felt to be functional. Could be related to worsening liver mets.      3.) Klebsiella Bacteremia   - on Cefepime; mgmt per IM - changed to Cipro PO 8/9/21   - still with low grade temp 99   - NOT neutropenic      4.) Chemo-induced neutropenia/thrombocytopenia.  - Continue Filgrastim. ONCOLOGIC DISPOSITION: TBD due to Klebsiella bacteremia, counts dropping. Needs to be through his trough before his discharge. I do not believe we are through the trough today (8/10/2021).       Melchor Moon, KATIUSKA   [facility]   Please contact through [facility]

## 2021-08-10 NOTE — PLAN OF CARE
Problem: Pain:  Goal: Pain level will decrease  Description: Pain level will decrease  Outcome: Ongoing   Pt satisfied with current pain regimen

## 2021-08-10 NOTE — PROGRESS NOTES
Hospital Problems         Last Modified POA    Fever and chills 8/5/2021 Yes    Bacteremia 8/9/2021 Yes        Assessment & Plan  76year old male with metastatic pancreas cancer s/p bile duct metal stent by Dr Hector Diaz in 7/2021 that migrated up into the cystic duct and he was unable to remove, so an external biliary drain was placed. His bili is 6.9, but he has no GI Sx. Dr Hector Diaz believes repeat ERCP will not help because the stent is \"embedded in the cystic duct\". IR decided the drain is patent. Unfortunately, hus rising bilirubin may be due to worsening liver metastasis, not amenable to endoscopic Rx.     Plan:   1. Supportive care  2. On Abx's for Klebsiella  3. Awaiting to get through the Chemo trough before he gets discharged  4.  Will follow     MD Tariq Bender [phone]    Electronically signed by Arabella Bueno MD on 8/9/21 at 9:01 PM EDT

## 2021-08-11 NOTE — PROGRESS NOTES
Pt d/c'd 8/11/21. Port de-accessed removed with no complications. Notified [facility] and removed tele box. Reviewed d/c instructions, home meds, and f/u information utilizing teach-back method. Scripts for benadryl and bactrim filled by outpatient pharmacy. Patient verbalized understanding. Patient assisted to lobby in wheelchair and left with all documented belongings.

## 2021-08-11 NOTE — DISCHARGE INSTR - COC
Continuity of Care Form    Patient Name: Mary Malik   :  1945  MRN:  [id number]    Admit date:  2021  Discharge date:  21    Code Status Order: Full Code   Advance Directives:      Admitting Physician:  Wan Cotton MD  PCP: Sujatha Cheung DO    Discharging Nurse: April, [facility] Unit/Room#: 8198/1718-82  Discharging Unit Phone Number: [phone]    Emergency Contact:   Extended Emergency Contact Information  Primary Emergency Contact: Pallavi Poole  Address: [facility], [address] [facility],[address] Phone: [phone]  Mobile Phone: [phone]  Relation: Spouse  Secondary Emergency Contact: Gloria Altamirano Phone: [phone]  Relation: Child    Past Surgical History:  Past Surgical History:   Procedure Laterality Date    COLONOSCOPY  10/29/2013    ERCP N/A 2021    ERCP SPHINCTER/PAPILLOTOMY performed by Lety Cade MD at [address] [facility] ERCP  2021    ERCP STENT INSERTION performed by Lety Cade MD at [facility] ERCP N/A 2021    ERCP STONE REMOVAL    ERCP N/A 2021    ERCP STONE REMOVAL performed by Lety Cade MD at [facility] Left 10/04/2017    excision left forearm mass    GASTRIC FUNDOPLICATION  [id number]  2015    ROBOTIC ASSISTED HIATAL HERNIA REPAIR WITH NISSEN    IR BILIARY DRAIN EXTERNAL  2021    IR BILIARY DRAIN EXTERNAL 2021 [facility]    JOINT REPLACEMENT Left 13    left total knee replacement    JOINT REPLACEMENT Right 2019    right TKR    KNEE ARTHROSCOPY      left    OTHER SURGICAL HISTORY  2021    ERCP WF W/upper EUS W/Metal biliary stent replacement W/Anes.     OTHER SURGICAL HISTORY N/A 2021    PERCUTANEOUS GALLBLADDER DRAIN PLACEMENT    PORT SURGERY N/A 2021    SURGICAL PORT PLACEMENT performed by Joel Figueredo MD at [facility] N/A 7/28/2021    PERCUTANEOUS GALLBLADDER DRAIN PLACEMENT performed by Malcolm Howard MD at [facility] Right 10/22/2020    RIGHT TOTAL KNEE REPLACEMENT      CELAYA & NEPHEW performed by Christina Vega MD at [facility]  2015    bx    UPPER GASTROINTESTINAL ENDOSCOPY N/A 7/7/2021    UPPER EUS W/ANES.  performed by Dayami Gudino MD at [facility]  7/7/2021    EGD BIOPSY performed by Dayami Gudino MD at [address] [facility],[address] [facility]         Immunization History:   Immunization History   Administered Date(s) Administered    COVID-19, J&J, PF, 0.5 mL 03/04/2021    Influenza Virus Vaccine 10/23/2014, 09/05/2020    Influenza, High Dose (Fluzone 65 yrs and older) 10/29/2015, 11/17/2016, 11/29/2017, 10/02/2018, 11/12/2019    Influenza, MDCK Quadv, with preserv IM (Flucelvax 4 yrs and older) 08/27/2019    Pneumococcal Conjugate 13-valent (Evyxyym83) 06/25/2018    Pneumococcal Polysaccharide (Itneshxpx13) 02/03/2013    Tdap (Boostrix, Adacel) 06/08/2017    Zoster Live (Zostavax) 08/27/2014    Zoster Recombinant (Shingrix) 08/24/2014, 07/19/2019, 08/27/2019       Active Problems:  Patient Active Problem List   Diagnosis Code    Left TKR Z96.659    HTN (hypertension) I10    GERD (gastroesophageal reflux disease) K21.9    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    Cervical radiculitis M54.12    Ulnar neuropathy of left upper extremity G56.22    Left wrist pain M25.532    Mass of left wrist R22.32    Thoracic or lumbosacral neuritis or radiculitis, unspecified [id number]    Syncope and collapse R55    Other cervical disc degeneration at C6-C7 level M50.323    Other cervical disc degeneration at C4-C5 level M50.321    Hyperlipidemia E78.5    Arthritis of right hand M19.041    Status post total right knee replacement Z96.651    S/P total knee arthroplasty, right Z96.651    Lumbosacral spondylosis without myelopathy M47.817    Biliary obstruction K83.1    Pancreas cancer (HCC) C25.9    Fever and chills R50.9    Bacteremia R78.81       Isolation/Infection:   Isolation            No Isolation          Patient Infection Status       None to display            Nurse Assessment:  Last Vital Signs: /69   Pulse 85   Temp 98 °F (36.7 °C) (Oral)   Resp 16   Ht 5' 6\" (1.676 m)   Wt 132 lb 14.4 oz (60.3 kg)   SpO2 94%   BMI 21.45 kg/m²     Last documented pain score (0-10 scale): Pain Level: 6  Last Weight:   Wt Readings from Last 1 Encounters:   08/11/21 132 lb 14.4 oz (60.3 kg)     Mental Status:  oriented and alert    IV Access:  - None Port deaccessed    Nursing Mobility/ADLs:  Walking   Independent  Transfer  Independent  Bathing  Independent  Dressing  Independent  [address]  Independent  Med Delivery   whole    Wound Care Documentation and Therapy:   none, FYI: Right upper quadrant biliary duct drain      Elimination:  Continence:   · Bowel: yes  · Bladder: Yes  Urinary Catheter: None   Colostomy/Ileostomy/Ileal Conduit: No        Date of Last BM: 8/11/21    Intake/Output Summary (Last 24 hours) at 8/11/2021 1156  Last data filed at 8/11/2021 1053  Gross per 24 hour   Intake 720 ml   Output 80 ml   Net 640 ml     I/O last 3 completed shifts: In: 480 [P.O.:480]  Out: 80 [Drains:80]    Safety Concerns:     None    Impairments/Disabilities:      None    Nutrition Therapy:  Current Nutrition Therapy:   - Oral Diet:  General    Routes of Feeding: Oral  Liquids:  Thin Liquids  Daily Fluid Restriction: no  Last Modified Barium Swallow with Video (Video Swallowing Test): not done    Treatments at the Time of Hospital Discharge:   Respiratory Treatments: ***  Oxygen Therapy:  {Therapy; copd oxygen:06302:::0}  Ventilator:    { CC Vent ZCIW:[id number]:::2}    Rehab Therapies: Nurse   Weight Bearing Status/Restrictions: [address] CC Weight Bearin:::0}  Other Medical Equipment (for information only, NOT a DME order):  {EQUIPMENT:[id number]}  Other Treatments: [facility]: LEVEL 3 [address] to establish plan of care for patient over 60 day period   Nursing  Initial home SN evaluation visit to occur within 24-48 hours for:  1)  medication management  2)  VS and clinical assessment  3)  S&S chronic disease exacerbation education + when to contact MD/NP  4)  care coordination  Medication Reconciliation during 1st SN visit  PT/OT/Speech   Evaluations in home within 24-48 hours of discharge to include DME and home safety   Frontload therapy 5 days, then 3x a week   OT to evaluate if patient has [address] needs for personal care    evaluation within 24-48 hours to evaluate resources & insurance for potential AL, [place], LTC, and Medicaid options   Palliative Care referral within 5 days of hospital discharge   PCP Visit scheduled within 3 - 7 days of hospital discharge [facility] (If patient is agreeable and meets guidelines)      Patient's personal belongings (please select all that are sent with patient):  {CHP DME Belongings:976234638:::0}    RN SIGNATURE:  {Esignature:[id number]:::0}    CASE MANAGEMENT/SOCIAL WORK SECTION    Inpatient Status Date: ***    Readmission Risk Assessment Score:  Readmission Risk              Risk of Unplanned Readmission:  16           Discharging to Facility/ Agency   Name:  [facility]    Address: [address] [place] [address], [address] [place] [address]  Phone: [phone]  Fax: [phone]      Dialysis Facility (if applicable)   · Name:  · Address:  · Dialysis Schedule:  · Phone:  · Fax:    / signature: {Esignature:[id number]:::0}    PHYSICIAN SECTION    Prognosis: Fair    Condition at Discharge: Stable    Rehab Potential (if transferring to Rehab): Good    Recommended Labs or Other Treatments After Discharge: Follow up with PCP within 1-2 weeks. Follow up with GI per their instructions. Physician Certification: I certify the above information and transfer of Carlos Tafoya  is necessary for the continuing treatment of the diagnosis listed and that he requires [address] for less 30 days.      Update Admission H&P: No change in H&P    PHYSICIAN SIGNATURE:  Electronically signed by Grisel Purvis MD on 8/11/21 at 11:57 AM EDT

## 2021-08-11 NOTE — ADT AUTH CERT
Utilization Reviews       Sepsis and Other Febrile Illness, without Focal Infection - Care Day 6 (8/10/2021) by Kayleen Rawls RN       Review Status Review Entered   Completed 8/11/2021 15:13      Criteria Review      Care Day: 6 Care Date: 8/10/2021 Level of Care: Telemetry    Guideline Day 2    Clinical Status    (X) * Hemodynamic stability    8/11/2021 3:13 PM EDT by Germaine Hernandez      BP (!) 154/78   Pulse 88    (X) * Hypoxemia absent    8/11/2021 3:13 PM EDT by Germaine Overall      95% on room air    (X) * Tachypnea absent    8/11/2021 3:13 PM EDT by Germaine Overall      RR 16    Routes    (X) Diet as tolerated    Interventions    (X) WBC    8/11/2021 3:13 PM EDT by Germaine Overall      5.8    Medications    (X) Possible DVT prophylaxis    8/11/2021 3:13 PM EDT by Germaine Overall      Lovenox    * Milestone   Additional Notes   8/10 - CD 6      IM   Subjective: [facility] feels well in general.  No subjective fevers or chills.  Some mild abdominal pain persists.        It appears that neutrophils and plts are both rising compared to 8/7. [facility] has mounted an appropriate bandemia response.  Defer timing of discharge to oncology. BP (!) 154/78   Pulse 88   Temp 98.1 °F (36.7 °C) (Oral)   Resp 16   Ht 5' 6\" (1.676 m)   Wt 134 lb 9.6 oz (61.1 kg)   SpO2 95%   BMI 21.73 kg/m²        General appearance: Frail and stoic    HEENT: Pupils equal, round, and reactive to light. Conjunctivae/corneas clear. Neck: Supple, with full range of motion. No jugular venous distention. Trachea midline. Respiratory:  Normal respiratory effort. Clear to auscultation, bilaterally without Rales/Wheezes/Rhonchi. Cardiovascular: Regular rate and rhythm with normal S1/S2 without murmurs, rubs or gallops. Abdomen: Soft, mild RUQ tenderness, non-distended with normal bowel sounds. Musculoskeletal: No clubbing, cyanosis or edema bilaterally.  Full range of motion without deformity.    Skin: Skin color, texture, turgor normal.  No rashes or lesions. Neurologic:  Neurovascularly intact without any focal sensory/motor deficits. Cranial nerves: II-XII intact, grossly non-focal.   Psychiatric: Alert and oriented, thought content appropriate, normal insight   Capillary Refill: Brisk,3 seconds, normal    Peripheral Pulses: +2 palpable, equal bilaterally          A/P   Klebsiella bacteremia, presumably due to cholangitis.  Present on admission.   - empiric cefepime initially, then continued with PO cipro.  The plan was to continue for 10 days after discharge per ID, but then the patient developed a rash on the same day as starting cipro.  Changed to bactrim PO and will continue for 10 days post discharge.        Metastatic pancreatic cancer complicated by obstruction of the biliary tree. - ERCP with sphincterotomy and stent placement, 7/07/2021, with Dr. Dominguez Villa. - A follow up CT on 7/22/2021 showed increased biliary dilatation.   - Another ERCP was attempted, but the stent could not be retrieved and was felt to have migrated into the cystic duct. - An 8 Polish external biliary drain was placed in the common hepatic duct on 7/28/2021.   - continue present management.  Will need close f/u with GI.  Appreciate consultation while here.        Chemo-induced neutropenia/thrombocytopenia   -  Filgrastim per hem/onc       Moderate protein-calorie malnutrition in the setting of above.  Supplements.           DVT Prophylaxis: SCDs   Diet: ADULT DIET; Regular   Adult Oral Nutrition Supplement; Frozen Oral Supplement   Adult Oral Nutrition Supplement; Standard High Calorie/High Protein Oral Supplement   Code Status: Full Code       PT/OT Eval Status: not indicated       Dispo - ready to discharge when oncology is satisfied with his CBC parameters.  Will evaluate for discharge daily. Bobbi Antonio lives at home.              Oncology   ASSESSMENT AND PLAN:   1.) Metastatic pancreatic cancer   - Started [place]/Abraxane on 8/02/2021.   - Due for cycle 1, day 8 on 8/09/2021. Likely to hold at least Abraxane with elevated bili of 6.8 - defer to Dr. Justen Geronimo ; cont to hold chemo until counts stable and infection cleared.        2.) Obstructive jaundice   - ERCP with sphincterotomy and stent placement, 7/07/2021, with Dr. Deepa White. - A follow up CT on 7/22/2021 showed increased biliary dilatation.   - Another ERCP was attempted, but the stent could not be retrieved and was felt to have migrated into the cystic duct. - An 8 Tajik external biliary drain was placed in the common hepatic duct on 7/28/2021.  Total bili was 3.9 mg/dL at that time. - Now, the t bili is 6.8 mg/dL.  ERCP is not felt to be helpful due to the stent potentially being lodged in the cystic duct and the biliary drain was evaluated in IR and felt to be functional. Could be related to worsening liver mets.        3.) Klebsiella Bacteremia    - on Cefepime; mgmt per IM - changed to Cipro PO 8/9/21    - still with low grade temp 99    - NOT neutropenic         4.) Chemo-induced neutropenia/thrombocytopenia.   - Continue Filgrastim.       ONCOLOGIC DISPOSITION: TBD due to Klebsiella bacteremia, counts dropping.  Needs to be through his trough before his discharge.  I do not believe we are through the trough today (8/10/2021).              GI   1. Supportive care   2. On Abx's for Klebsiella   3. Awaiting to get through the Chemo trough before he gets discharged   4. Will follow               Labs   Results for Clevester Peaks \"TRINO\" (MRN [id number]) as of 8/11/2021 15:11      8/10/2021 05:18   Sodium: 131 (L)   Potassium: 3.7   Chloride: 101   CO2: 21   BUN: 16   Creatinine: 0.6 (L)   Anion Gap: 9   GFR Non-: >60   GFR African American: >60   Glucose: 152 (H)   Calcium: 7.9 (L)   Total Protein: 6.0 (L)   Albumin: 2.4 (L)   Alk Phos: 286 (H)   ALT: 50 (H)   AST: 101 (H)   Bilirubin: 6.8 (H)   Bilirubin, Direct: 5.2 (H)   Bilirubin, Indirect: 1.6 (H)   WBC: 5.8   RBC: 3.08 (L)   Hemoglobin little sore. /74   Pulse 80   Temp 98.1 °F (36.7 °C) (Oral)   Resp 16   Ht 5' 6\" (1.676 m)   Wt 134 lb 9.6 oz (61.1 kg)   SpO2 98%   BMI 21.73 kg/m²        General appearance: Frail and stoic    HEENT: Pupils equal, round, and reactive to light. Conjunctivae/corneas clear. Neck: Supple, with full range of motion. No jugular venous distention. Trachea midline. Respiratory:  Normal respiratory effort. Clear to auscultation, bilaterally without Rales/Wheezes/Rhonchi. Cardiovascular: Regular rate and rhythm with normal S1/S2 without murmurs, rubs or gallops. Abdomen: Soft, mild RUQ tenderness, non-distended with normal bowel sounds. Musculoskeletal: No clubbing, cyanosis or edema bilaterally.  Full range of motion without deformity. Skin: Skin color, texture, turgor normal.  No rashes or lesions. Neurologic:  Neurovascularly intact without any focal sensory/motor deficits. Cranial nerves: II-XII intact, grossly non-focal.   Psychiatric: Alert and oriented, thought content appropriate, normal insight   Capillary Refill: Brisk,3 seconds, normal    Peripheral Pulses: +2 palpable, equal bilaterally          A/P   Klebsiella bacteremia, presumably due to cholangitis.  Present on admission.   - empiric cefepime initially, then continued with PO cipro.  Will continue for 10 days after discharge per ID.       Metastatic pancreatic cancer complicated by obstruction of the biliary tree. - ERCP with sphincterotomy and stent placement, 7/07/2021, with Dr. Danna Ren. - A follow up CT on 7/22/2021 showed increased biliary dilatation.   - Another ERCP was attempted, but the stent could not be retrieved and was felt to have migrated into the cystic duct.    - An 8 [place] Leonean external biliary drain was placed in the common hepatic duct on 7/28/2021.   - continue present management.  Will need close f/u with GI.  Appreciate consultation while here.        Chemo-induced neutropenia/thrombocytopenia -  Filgrastim per hem/onc       Moderate protein-calorie malnutrition in the setting of above.  Supplements.           DVT Prophylaxis: SCDs   Diet: ADULT DIET; Regular   Adult Oral Nutrition Supplement; Frozen Oral Supplement   Adult Oral Nutrition Supplement; Standard High Calorie/High Protein Oral Supplement   Code Status: Full Code       PT/OT Eval Status: not indicated                Hematology   ASSESSMENT AND PLAN:   1.) Metastatic pancreatic cancer   - Started [place]/Abraxane on 8/02/2021.   - Due for cycle 1, day 8 on 8/09/2021. Likely to hold at least Abraxane with elevated bili of 6.9 - defer to Dr. Neal Arreaga        2.) Obstructive jaundice   - ERCP with sphincterotomy and stent placement, 7/07/2021, with Dr. Jun Solares. - A follow up CT on 7/22/2021 showed increased biliary dilatation.   - Another ERCP was attempted, but the stent could not be retrieved and was felt to have migrated into the cystic duct. - An 8 Thai external biliary drain was placed in the common hepatic duct on 7/28/2021.  Total bili was 3.9 mg/dL at that time. - Now, the t bili is 6.0 mg/dL.  ERCP is not felt to be helpful due to the stent potentially being lodged in the cystic duct and the biliary drain was evaluated in IR and felt to be functional.         3.) Klebsiella Bacteremia    - on Cefepime; mgmt per IM    - possible ID consult    - still with low grade temp 99.5    - NOT neutropenic         4.) Chemo-induced neutropenia/thrombocytopenia.   - Continue Filgrastim.       ONCOLOGIC DISPOSITION: TBD due to Klebsiella bacteremia, counts dropping.  Needs to be through his trough before his discharge. Cont granix; check liver function in AM. Discussed with Dr. Neal Arreaga. Family/patient and RN aware of need to stay in hospital.                GI   1. Supportive care   2. On Abx's for Klebsiella   3. Awaiting to get through the Chemo trough before he gets discharged   4. Will follow               Labs   Results for Nu Diaz \"TRINO\" (MRN [id number]) as of 8/11/2021 15:11      8/9/2021 06:45   Sodium: 130 (L)   Potassium: 4.0   Chloride: 99   CO2: 23   BUN: 15   Creatinine: 0.6 (L)   Anion Gap: 8   GFR Non-: >60   GFR African American: >60   Glucose: 114 (H)   Calcium: 8.1 (L)   WBC: 4.3   RBC: 3.03 (L)   Hemoglobin Quant: 9.7 (L)   Hematocrit: 27.5 (L)   MCV: 90.8   MCH: 32.0   MCHC: 35.3   MPV: 9.1   RDW: 15.9 (H)   Platelet Count: 61 (L)                  Meds   · ciprofloxacin 500 mg Oral 2 times per day   · tbo-filgrastim 300 mcg Subcutaneous QPM   · tamsulosin 0.4 mg Oral Daily   · lipase-protease-amylase 36,000 Units Oral TID WC   · aspirin 81 mg Oral Daily         oxyCODONE (ROXICODONE) immediate release tablet 10 mg    Dose: 10 mg   Freq: EVERY 4 HOURS PRN Route: PO   PRN Reason: Pain Moderate (4-6)   X1

## 2021-08-11 NOTE — CARE COORDINATION
CASE MANAGEMENT DISCHARGE SUMMARY      Discharge to: home with [facility] care (skilled nursing ONLY to f/u on     IMM given: (date) 8/9/21    New Durable Medical Equipment ordered/agency: n/a    Transportation:    Family/car: family    Confirmed discharge plan with:     Patient: yes     Family:  Yes. Spouse at bedside. CM communicated with spouse via her cell phone and verified discharge plan. Facility/Agency, name:  [facility]S obtained via Epic acces per [facility], name: April    Note: Discharging nurse to complete [facility], reconcile AVS, and place final copy with patient's discharge packet. RN to ensure that written prescriptions for Level II medications are sent with patient as per protocol.     Tae Palmer RN

## 2021-08-11 NOTE — CARE COORDINATION
[facility]    DC order noted, all docs needed [facility].     Home care to see patient within 24-48 hrs    Galindo Camacho RN, BSN CTN  [facility] [phone]

## 2021-08-11 NOTE — PROGRESS NOTES
ONCOLOGY HEMATOLOGY CARE PROGRESS NOTE      SUBJECTIVE:  CBC improved. No fevers. Benadryl helping with drug rash and no longer itchy. Wife at bedside. No other complaints. NO SOB or chest pain. ROS:     Constitutional:  No weight loss, No fever, No chills, No night sweats. Energy level good. Eyes:  No impairment or change in vision  ENT / Mouth:  No pain, abnormal ulceration, bleeding, nasal drip or change in voice or hearing  Cardiovascular:  No chest pain, palpitations, new edema, or calf discomfort  Respiratory:  No pain, hemoptysis, change to breathing  Breast:  No pain, discharge, change in appearance or texture  Gastrointestinal:  No pain, cramping, jaundice, change to eating and bowel habits  Urinary:  No pain, bleeding or change in continence  Genitalia: No pain, bleeding or discharge  Musculoskeletal:  No redness, pain, edema or weakness  Skin:  upper extremity and lower extremity maculopapular erythematous rash; raise and confluent, no blisters or ulcerations    Neurologic:  No discomfort, change in mental status, speech, sensory or motor activity  Psychiatric:  No change in concentration or change to affect or mood  Endocrine:  No hot flashes, increased thirst, or change to urine production  Hematologic: No petechiae, ecchymosis or bleeding  Lymphatic:  No lymphadenopathy or lymphedema  Allergy / Immunologic:  No eczema, hives, frequent or recurrent infections    OBJECTIVE        Physical    VITALS:  /69   Pulse 85   Temp 98 °F (36.7 °C) (Oral)   Resp 16   Ht 5' 6\" (1.676 m)   Wt 132 lb 14.4 oz (60.3 kg)   SpO2 94%   BMI 21.45 kg/m²   TEMPERATURE:  Current - Temp: 98 °F (36.7 °C);  Max - Temp  Av.2 °F (36.8 °C)  Min: 98 °F (36.7 °C)  Max: 98.6 °F (37 °C)  PULSE OXIMETRY RANGE: SpO2  Av.3 %  Min: 92 %  Max: 95 %  24HR INTAKE/OUTPUT:      Intake/Output Summary (Last 24 hours) at 2021 1146  Last data filed at 2021 1053  Gross per 24 hour   Intake 720 ml   Output 80 ml   Net 640 ml       CONSTITUTIONAL: awake, alert, cooperative, no apparent distress   EYES: pupils equal, round and reactive to light, sclera clear and conjunctiva normal  ENT: Normocephalic, without obvious abnormality, atraumatic  NECK: supple, symmetrical, no jugular venous distension and no carotid bruits   HEMATOLOGIC/LYMPHATIC: no cervical, supraclavicular or axillary lymphadenopathy   LUNGS: no increased work of breathing and clear to auscultation   CARDIOVASCULAR: regular rate and irregular rhythm, normal S1 and S2, no murmur noted  ABDOMEN: normal bowel sounds x 4, soft, non-distended, non-tender, no masses palpated, no hepatosplenomgaly   MUSCULOSKELETAL: full range of motion noted, tone is normal  NEUROLOGIC: awake, alert, oriented to name, place and time. Motor skills grossly intact.    SKIN:Skin:  upper extremity and lower extremity maculopapular erythematous rash; raise and confluent, no blisters or ulcerations  ; jaundice   EXTREMITIES: no LE edema       Data      Recent Labs     08/09/21  0645 08/10/21  0518 08/11/21  0511   WBC 4.3 5.8 19.7*   HGB 9.7* 10.0* 10.5*   HCT 27.5* 28.4* 29.8*   PLT 61* 65* 92*   MCV 90.8 92.3 90.7        Recent Labs     08/09/21  0645 08/10/21  0518   * 131*   K 4.0 3.7   CL 99 101   CO2 23 21   BUN 15 16   CREATININE 0.6* 0.6*     Recent Labs     08/10/21  0518   *   ALT 50*   BILIDIR 5.2*   BILITOT 6.8*   ALKPHOS 286*       Magnesium:    Lab Results   Component Value Date    MG 1.90 11/19/2018    MG 2.10 02/22/2015    MG 2.10 02/21/2015         Problem List  Patient Active Problem List   Diagnosis    Left TKR    HTN (hypertension)    GERD (gastroesophageal reflux disease)    Bilateral high frequency sensorineural hearing loss    Cervical radiculitis    Ulnar neuropathy of left upper extremity    Left wrist pain    Mass of left wrist    Thoracic or lumbosacral neuritis or radiculitis, unspecified    Syncope and collapse    Other cervical disc degeneration at C6-C7 level    Other cervical disc degeneration at C4-C5 level    Hyperlipidemia    Arthritis of right hand    Status post total right knee replacement    S/P total knee arthroplasty, right    Lumbosacral spondylosis without myelopathy    Biliary obstruction    Pancreas cancer (HCC)    Fever and chills    Bacteremia       ASSESSMENT AND PLAN:  1.) Metastatic pancreatic cancer  - Started [place]/Abraxane on 8/02/2021.  - Due for cycle 1, day 8 on 8/09/2021. Likely to hold at least Abraxane with elevated bili of 6.8 - defer to Dr. Brad Ang ; cont to hold chemo until counts stable and infection cleared. Defer to next week if CBC stable- could give Gemzar only with elevated bili- defer to Dr. Brad Ang      2.) Obstructive jaundice  - ERCP with sphincterotomy and stent placement, 7/07/2021, with Dr. Agee. - A follow up CT on 7/22/2021 showed increased biliary dilatation.  - Another ERCP was attempted, but the stent could not be retrieved and was felt to have migrated into the cystic duct. - An 8 [place]ian external biliary drain was placed in the common hepatic duct on 7/28/2021. Total bili was 3.9 mg/dL at that time. - Now, the t bili is 6.8 mg/dL.   ERCP is not felt to be helpful due to the stent potentially being lodged in the cystic duct and the biliary drain was evaluated in IR and felt to be functional. Could be related to worsening liver mets.      3.) Klebsiella Bacteremia   - on Cefepime; mgmt per IM - changed to Cipro PO 8/9/21 - changed to Bactrim 8/11 due to possible Cipro drug rash   - low grade temp resolved   - NOT neutropenic      4.) Chemo-induced neutropenia/thrombocytopenia.  - Last dose granix 8/10 - WBC now 19 today 8/11     5) Drug rash?  - Discussed with Dr. Kathleen Shah   - Benadryl PRN   - itching improved and ATB changed  - Solumedrol 40 mg IV X 1 dose today     6) Irregular Heart Rhythm  - no sx  - EKG 8/4 with normal sinus with PAC  - discussed with Dr. Merlene Chairez: now on PO Bactrim, changed from Cipro due to possible drug rash; responding to Benadryl; he is not out of his silvana and stable to dc from oncology point of view. Give one time dose Solumedrol 40 mg IV today for rash.      Follow up in the [facility] office early next week for possible chemo  - will have [facility] staff call patient with appointment time /day - patient and wife are aware     Paige Guadarrama, KATIUSKA   [facility]   Please contact through [employer]

## 2021-08-11 NOTE — PLAN OF CARE
Problem: Pain:  Goal: Pain level will decrease  Description: Pain level will decrease  Outcome: Ongoing   Pt denies pain at this time. Pt satisfied with current pain regimen.

## 2021-08-11 NOTE — DISCHARGE SUMMARY
CHEST WO CONTRAST   Final Result   1. Worsening mild-to-moderate interstitial edema and trace bilateral pleural   effusions likely due to fluid overload. 2. Unchanged multiple solid and ground-glass bilateral pulmonary nodules bear   attention on the next imaging cycle. 3. Unchanged right hepatic mass concerning for metastatic disease. 4. Improved with residual mild intrahepatic duct dilatation status post   external biliary drain. CT ABDOMEN PELVIS W IV CONTRAST Additional Contrast? None   Final Result   1. No evidence for abscess. 2. Possible increased size of the dominant liver lesion though the apparent   change could be due to differences in timing of the contrast bolus. 3. Suspect developing lytic bone metastases. XR CHEST (2 VW)   Final Result   Left pleural effusion with bilateral atelectasis but no evidence for   pneumonia. Consults:     IP CONSULT TO HOSPITALIST  IP CONSULT TO HEM/ONC  IP CONSULT TO GI  IP CONSULT TO PHARMACY  IP CONSULT TO INFECTIOUS DISEASES  IP CONSULT TO CASE MANAGEMENT    Disposition:  home     Condition at Discharge: Stable    Discharge Instructions/Follow-up:  Follow up with PCP within 1-2 weeks. Follow up with GI per their instructions.       Code Status:  Full Code     Activity: activity as tolerated    Diet: regular diet      Discharge Medications:     Current Discharge Medication List           Details   diphenhydrAMINE (BENADRYL) 25 MG tablet Take 1 tablet by mouth every 6 hours as needed for Itching  Qty: 20 tablet, Refills: 0      sulfamethoxazole-trimethoprim (BACTRIM DS;SEPTRA DS) 800-160 MG per tablet Take 1 tablet by mouth every 12 hours for 10 days  Qty: 20 tablet, Refills: 0              Details   ondansetron (ZOFRAN) 8 MG tablet Take by mouth as needed for Nausea or Vomiting (after chemo)       prochlorperazine (COMPAZINE) 10 MG tablet 10 mg every 6 hours as needed       Magnesium Hydroxide (DULCOLAX PO) Take 1 tablet by mouth

## 2021-08-12 NOTE — CARE COORDINATION
[facility] Transitions Initial Follow Up Call    Call within 2 business days of discharge: Yes    Patient: Miquel Rios Patient : 1945   MRN: [id number]  Reason for Admission: fever/chills  Discharge Date: 21 RARS: Readmission Risk Score: 16      Last Discharge [facility]       Complaint Diagnosis Description Type Department Provider    21 Post-op Problem Fever, unspecified fever cause ED to Hosp-Admission (Discharged) (ADMITTED) [place] Adán Nguyen MD; Kristen Talley. .. 21   Admission (Discharged) [address]  Jason Dominguez MD           Spoke with: [address]: [facility]    Transitions of Care Initial Call      Challenges to be reviewed by the provider   Additional needs identified to be addressed with provider: No  none             Method of communication with provider : phone      Advance Care Planning:   Does patient have an Advance Directive: reviewed and current, reviewed and needs to be updated, not on file; education provided, not on file, patient declined education, decision maker updated and referral to internal ACP facilitator. Was this a readmission? Yes  Patient stated reason for admission: fever/chills  Patients top risk factors for readmission: medical condition-fever/chills    Care Transition Nurse (CTN) contacted the patient by telephone to perform post hospital discharge assessment. Verified name and  with patient as identifiers. Provided introduction to self, and explanation of the CTN role. CTN reviewed discharge instructions, medical action plan and red flags with patient who verbalized understanding. Patient given an opportunity to ask questions and does not have any further questions or concerns at this time. Were discharge instructions available to patient? Yes.  Reviewed appropriate site of care based on symptoms and resources available to patient including: PCP and Specialist. The patient agrees to contact the PCP office for questions related to their healthcare. Medication reconciliation was performed with patient, who verbalizes understanding of administration of home medications. Advised obtaining a 90-day supply of all daily and as-needed medications. Covid Risk Education     Educated patient about risk for severe COVID-19 due to risk factors according to CDC guidelines. CTN reviewed discharge instructions, medical action plan and red flag symptoms with the patient who verbalized understanding. Discussed COVID vaccination status: Yes. Education provided on COVID-19 vaccination as appropriate. Discussed exposure protocols and quarantine with CDC Guidelines. Patient was given an opportunity to verbalize any questions and concerns and agrees to contact CTN or health care provider for questions related to their healthcare. Reviewed and educated patient on any new and changed medications related to discharge diagnosis. Was patient discharged with a pulse oximeter? No Discussed and confirmed pulse oximeter discharge instructions and when to notify provider or seek emergency care. Patient answered call and verified . Patient pleasant and agreeable to transition call. Patient slept well last night and just finished morning care. Patient verified that he was able to get new prescriptions and taking as directed. Full medication reconciliation declined, but confirmed that he is taking all medications. Patient has received call from [facility] and nursing is scheduled for home visit today between 11am-1pm for skilled nursing services only. Patient declined any assistance with scheduling follow up appts. Patient stated that he has MD numbers and will make follow up appts today after home care nurse visit. Denies any acute needs at present time. Agreeable to f/u calls. Educated on the use of urgent care or physicians 24 hr access line if assistance is needed after hours. CTN provided contact information.  Plan for follow-up call in 5-7 days based on severity of symptoms and risk factors. Мария Roberts RN   Care Transition Nurse  [phone]    Care Transitions 24 Hour Call    Do you have any ongoing symptoms?: No  Do you have a copy of your discharge instructions?: Yes  Do you have all of your prescriptions and are they filled?: Yes  Have you been contacted by a [address]?: No  Have you scheduled your follow up appointment?: Yes  How are you going to get to your appointment?: Car - family or friend to transport  Were you discharged with any Home Care or Post Acute Services: Yes  Post Acute Services: [address]  Do you feel like you have everything you need to keep you well at home?: Yes  Care Transitions Interventions         Follow Up  No future appointments.     Мария Roberts RN

## 2021-08-16 NOTE — CARE COORDINATION
Received call from Special procedure dept [facility] So RN who had a call from [facility] regarding pt with a recent biliary drain inserted and the need for supplies and flushes and order for flush bag change and drsg change needed. So obtained order from Dr W[facility] for daily 10 ml sterile saline flush, drsg and bag change as needed. Spoke w Deneen Nguyen [facility] and faxed order to [phone] which completed all her needs. Also Meds to bed ran order for a month supply of flushes with a cost ~$12.00. Spoke w pt's spouse - in agreement to  10 day supply of sterile saline flushes and 2 drainage bags. Will  30 day supply from outpt pharmacy on Thursday 8/19 aware of cost.   1330- spouse cam by hospital and supplies brought to her car.

## 2021-08-19 NOTE — CARE COORDINATION
[facility] Transitions Follow Up Call    2021    Patient: Tomasz Benson  Patient : 1945   MRN: [id number]  Reason for Admission: fever/chills  Discharge Date: 21 RARS: Readmission Risk Score: 16         Spoke with: Tomsaz Benson    Patient answered call and verified . Patient request that CTN speak to his wife. patient has been losing weight and loss of appetite. Wife stated that she contacted oncologist and patient was started on an appetite stimulant. Patient has started today, but has not noticed anything different. Patient continues to have home care services and has been happy with nursing. Wife to follow up with oncologist with results of appetite stimulant and confirms that she has MD office number. Denies any acute needs at present time. Agreeable to f/u calls. Educated on the use of urgent care or physicians 24 hr access line if assistance is needed after hours. Arline Pepe RN   Care Transition Nurse  [phone]      Care Transitions Subsequent and Final Call    Subsequent and Final Calls  Do you have any ongoing symptoms?: No  Have your medications changed?: No  Do you have any questions related to your medications?: No  Do you currently have any active services?: Yes  Are you currently active with any services?: Home Health  Do you have any needs or concerns that I can assist you with?: No  Identified Barriers: None  Care Transitions Interventions  Other Interventions: Follow Up  No future appointments.     Arline Pepe RN

## 2021-08-26 NOTE — CARE COORDINATION
[facility] Transitions Follow Up Call    2021    Patient: Chio Current  Patient : 1945   MRN: <[id number]>  Reason for Admission: fever/chills  Discharge Date: 21 RARS: Readmission Risk Score: 16    Spoke with: . Shirlene Kruse (wife-HIPPA verified) who stated that patient is not doing good. They now have patient admitted to Hospice. Episode closed. Care Transitions Subsequent and Final Call    Subsequent and Final Calls  Care Transitions Interventions  Other Interventions: Follow Up  No future appointments.     Yeimy Moss LPN

## (undated) DEVICE — ADHESIVE SKIN CLSR 0.7ML TOP DERMBND ADV

## (undated) DEVICE — CANNULATING SPHINCTEROTOME: Brand: TRUETOME JAG 44

## (undated) DEVICE — DECANTER FLD 9IN ST BG FOR ASEP TRNSF OF FLD

## (undated) DEVICE — STERILE LATEX POWDER-FREE SURGICAL GLOVESWITH NITRILE COATING: Brand: PROTEXIS

## (undated) DEVICE — SET GRAV VENT NVENT CK VLV 3 NDL FREE PRT 10 GTT

## (undated) DEVICE — STERILE POLYISOPRENE POWDER-FREE SURGICAL GLOVES: Brand: PROTEXIS

## (undated) DEVICE — PENCIL SMK EVAC ALL IN 1 DSGN ENH VISIBILITY IMPROVED AIR

## (undated) DEVICE — CONMED SCOPE SAVER BITE BLOCK, 20X27 MM: Brand: SCOPE SAVER

## (undated) DEVICE — SYSTEM SKIN CLSR 22CM DERMBND PRINEO

## (undated) DEVICE — SUTURE VCRL + SZ 2-0 L18IN ABSRB UD CT1 L36MM 1/2 CIR VCP839D

## (undated) DEVICE — ELECTRODE ECG MONITR FOAM TEAR DROP ADLT RED

## (undated) DEVICE — FORCEPS ENDOSCP L230CM WRK CHN 2.8CM SHTH 2.4MM JAW OPN

## (undated) DEVICE — BLADE SURG SAW SAG S STL AGG 905MM LEN 185MM W 127MM THCK

## (undated) DEVICE — SPONGE LAP W18XL18IN WHT COT 4 PLY FLD STRUNG RADPQ DISP ST

## (undated) DEVICE — 3 BONE CEMENT MIXER: Brand: MIXEVAC

## (undated) DEVICE — RETRIEVAL BALLOON CATHETER: Brand: EXTRACTOR™ PRO XL

## (undated) DEVICE — SOLUTION IRRIG 2000ML 0.9% SOD CHL USP UROMATIC PLAS CONT

## (undated) DEVICE — SMARTGOWN SURGICAL GOWN, XL: Brand: CONVERTORS

## (undated) DEVICE — FORCEPS GRASPING PEDIATRIC RAT TOOTH

## (undated) DEVICE — ENDO CARRY-ON PROCEDURE KIT INCLUDES SUCTION TUBING, LUBRICANT, GAUZE, BIOHAZARD STICKER, TRANSPORT PAD AND INTERCEPT BEDSIDE KIT.: Brand: ENDO CARRY-ON PROCEDURE KIT

## (undated) DEVICE — 3M™ TEGADERM™ TRANSPARENT FILM DRESSING FRAME STYLE, 1624W, 2-3/8 IN X 2-3/4 IN (6 CM X 7 CM), 100/CT 4CT/CASE: Brand: 3M™ TEGADERM™

## (undated) DEVICE — SUTURE SURGLON SZ 1 L18IN NONABSORBABLE BLK GS 21 L37MM 1 2 8886196272

## (undated) DEVICE — ELECTRODE PT RET AD L9FT HI MOIST COND ADH HYDRGEL CORDED

## (undated) DEVICE — SUTURE MCRYL + SZ 4-0 L18IN ABSRB UD L19MM PS-2 3/8 CIR MCP496G

## (undated) DEVICE — Device

## (undated) DEVICE — SURGICAL PROCEDURE PACK IV U-BAR

## (undated) DEVICE — SUTURE VCRL SZ 3-0 L18IN ABSRB UD L26MM SH 1/2 CIR J864D

## (undated) DEVICE — Z CONVERTED USE 2271377 BANDAGE COMPR W6INXL5YD EC E REB

## (undated) DEVICE — SUTURE MCRYL SZ 4-0 L18IN ABSRB UD L19MM PS-2 3/8 CIR PRIM Y496G

## (undated) DEVICE — GOWN,SIRUS,NON REINFRCD,LARGE,SET IN SL: Brand: MEDLINE

## (undated) DEVICE — SMARTGOWN BREATHABLE SURGICAL GOWN: Brand: CONVERTORS

## (undated) DEVICE — CATHETER IV 20GA L1.25IN PNK FEP SFTY STR HUB RADPQ DISP

## (undated) DEVICE — PROVE COVER: Brand: UNBRANDED

## (undated) DEVICE — DRAPE C ARM W46XL120IN XLN

## (undated) DEVICE — SMARTGOWN SURGICAL GOWN, LARGE: Brand: CONVERTORS

## (undated) DEVICE — HOOD, PEEL-AWAY: Brand: FLYTE

## (undated) DEVICE — SOLUTION IV 1000ML LAC RINGERS PH 6.5 INJ USP VIAFLX PLAS

## (undated) DEVICE — CHLORAPREP 26ML ORANGE

## (undated) DEVICE — OPTIFOAM GENTLE SA, POSTOP, 4X12: Brand: MEDLINE

## (undated) DEVICE — MINOR SET UP PK

## (undated) DEVICE — COVER,TABLE,HEAVY DUTY,50"X90",STRL: Brand: MEDLINE

## (undated) DEVICE — SOLUTION IV 500ML 0.9% SOD CHL PH 5 INJ USP VIAFLX PLAS

## (undated) DEVICE — NEEDLE HYPO 20GA L1.5IN YEL POLYPR HUB S STL REG BVL STR

## (undated) DEVICE — SUTURE PROL 2-0 L48IN NONABSORBABLE BLU SH L26MM 1/2 CIR 8533H

## (undated) DEVICE — GLOVE,SURG,SENSICARE,ALOE,LF,PF,7: Brand: MEDLINE

## (undated) DEVICE — SET ADMIN PRIMING 7ML L30IN 7.35LB 20 GTT 2ND RLER CLMP

## (undated) DEVICE — DRILL BIT 3.2MM (1/8'') X 128.0MM

## (undated) DEVICE — INTENDED FOR TISSUE SEPARATION, AND OTHER PROCEDURES THAT REQUIRE A SHARP SURGICAL BLADE TO PUNCTURE OR CUT.: Brand: BARD-PARKER ® STAINLESS STEEL BLADES

## (undated) DEVICE — 3M™ COBAN™ SELF-ADHERENT WRAP, 1586S, STERILE, 6 IN X 5 YD (15 CM X 4,5 M), 12 ROLLS/CASE: Brand: 3M™ COBAN™

## (undated) DEVICE — HANDPIECE SET WITH HIGH FLOW TIP AND SUCTION TUBE: Brand: INTERPULSE